# Patient Record
Sex: FEMALE | Race: WHITE | NOT HISPANIC OR LATINO | Employment: PART TIME | URBAN - METROPOLITAN AREA
[De-identification: names, ages, dates, MRNs, and addresses within clinical notes are randomized per-mention and may not be internally consistent; named-entity substitution may affect disease eponyms.]

---

## 2017-01-18 ENCOUNTER — OFFICE VISIT (OUTPATIENT)
Dept: FAMILY MEDICINE | Facility: CLINIC | Age: 42
End: 2017-01-18
Payer: COMMERCIAL

## 2017-01-18 VITALS
WEIGHT: 293 LBS | HEART RATE: 59 BPM | HEIGHT: 68 IN | OXYGEN SATURATION: 97 % | RESPIRATION RATE: 16 BRPM | DIASTOLIC BLOOD PRESSURE: 81 MMHG | SYSTOLIC BLOOD PRESSURE: 131 MMHG | BODY MASS INDEX: 44.41 KG/M2 | TEMPERATURE: 99 F

## 2017-01-18 DIAGNOSIS — R05.8 PRODUCTIVE COUGH: Primary | ICD-10-CM

## 2017-01-18 DIAGNOSIS — J01.00 ACUTE MAXILLARY SINUSITIS, RECURRENCE NOT SPECIFIED: ICD-10-CM

## 2017-01-18 DIAGNOSIS — J06.9 UPPER RESPIRATORY TRACT INFECTION, UNSPECIFIED TYPE: ICD-10-CM

## 2017-01-18 DIAGNOSIS — R06.2 WHEEZING: ICD-10-CM

## 2017-01-18 PROCEDURE — 1159F MED LIST DOCD IN RCRD: CPT | Mod: S$GLB,,, | Performed by: PHYSICIAN ASSISTANT

## 2017-01-18 PROCEDURE — 99999 PR PBB SHADOW E&M-EST. PATIENT-LVL IV: CPT | Mod: PBBFAC,,, | Performed by: PHYSICIAN ASSISTANT

## 2017-01-18 PROCEDURE — 3075F SYST BP GE 130 - 139MM HG: CPT | Mod: S$GLB,,, | Performed by: PHYSICIAN ASSISTANT

## 2017-01-18 PROCEDURE — 3079F DIAST BP 80-89 MM HG: CPT | Mod: S$GLB,,, | Performed by: PHYSICIAN ASSISTANT

## 2017-01-18 PROCEDURE — 99213 OFFICE O/P EST LOW 20 MIN: CPT | Mod: S$GLB,,, | Performed by: PHYSICIAN ASSISTANT

## 2017-01-18 RX ORDER — AMOXICILLIN AND CLAVULANATE POTASSIUM 875; 125 MG/1; MG/1
1 TABLET, FILM COATED ORAL 2 TIMES DAILY
Qty: 20 TABLET | Refills: 0 | Status: SHIPPED | OUTPATIENT
Start: 2017-01-18 | End: 2017-01-28

## 2017-01-18 RX ORDER — BENZONATATE 200 MG/1
200 CAPSULE ORAL 3 TIMES DAILY PRN
Qty: 30 CAPSULE | Refills: 0 | Status: SHIPPED | OUTPATIENT
Start: 2017-01-18 | End: 2017-01-28

## 2017-01-18 RX ORDER — METHYLPREDNISOLONE 4 MG/1
TABLET ORAL
Qty: 1 PACKAGE | Refills: 0 | Status: SHIPPED | OUTPATIENT
Start: 2017-01-18 | End: 2017-03-21 | Stop reason: ALTCHOICE

## 2017-01-18 NOTE — PATIENT INSTRUCTIONS
Take antibiotics with food.  Increase fluid intake.  Call the clinic if symptoms worsen, new symptoms develop or if you are not any better after completion of your antibiotics.      Walking for Fitness  Fitness walking has something for everyone, even people who are already fit. Walking is one of the safest ways to condition your body aerobically. It can boost energy, help you lose weight, and reduce stress.    Physical benefits  · Walking strengthens your heart and lungs, and tones your muscles.  · When walking, your feet land with less impact than in other sports. This reduces chances of muscle, bone, and joint injury.  · Regular walking improves your cholesterol levels and lowers your risk of heart disease. And it helps you control your blood sugar if you have diabetes.  · Walking is a weight-bearing activity, which helps maintain bone density. This can help prevent osteoporosis.  Personal rewards  · Taking walks can help you relax and manage stress. And fitness walking may make you feel better about yourself.  · Walking can help you sleep better at night and make you less likely to be depressed.  · Regular walking may help maintain your memory as you get older.  · Walking is a great way to spend extra time with friends and family members. Be sure to invite your dog along!  Q&A about fitness walking  Q: Will walking keep me fit?  A: Yes. Regular walking at the right pace gives you all the benefits of other aerobic activities, such as jogging and swimming.  Q: Will walking help me lose weight and keep it off?  A: Yes. Per mile, walking can burn as many calories as jogging. Your health care provider can help work walking into your weight-loss plan.  Q: Is walking safe for my health?  A: Yes. Walking is safe if you have high blood pressure, diabetes, heart disease, or other conditions. Talk to your health care provider before you start.  © 4740-3785 [employer]. [address] [place] [address], [place]  [address]. All rights reserved. This information is not intended as a substitute for professional medical care. Always follow your healthcare professional's instructions.          Weight Management: Getting Started  Healthy bodies come in all shapes and sizes. Not all bodies are made to be thin. For some people, a healthy weight is higher than the average weight listed on weight charts. Your healthcare provider can help you decide on a healthy weight for you.    Reasons to lose weight  Losing weight can help with some health problems, such as high blood pressure, heart disease, diabetes, sleep apnea, and arthritis. You may also feel more energy.  Set your long-term goal  Your goal doesn't even have to be a specific weight. You may decide on a fitness goal (such as being able to walk 10 miles a week), or a health goal (such as lowering your blood pressure). Choose a goal that is measurable and reasonable, so you know when you've reached it. A goal of reaching a BMI of less than 25 is not always reasonable (or possible).   Make an action plan  Habits dont change overnight. Setting your goals too high can leave you feeling discouraged if you cant reach them. Be realistic. Choose one or two small changes you can make now. Set an action plan for how you are going to make these changes. When you can stick to this plan, keep making a few more small changes. Taking small steps will help you stay on the path to success.  Track your progress  Write down your goals. Then, keep a daily record of your progress. Write down what you eat and how active you are. This record lets you look back on how much youve done. It may also help when youre feeling frustrated. Reward yourself for success. Even if you dont reach every goal, give yourself credit for what you do get done.  Get support  Encouragement from others can help make losing weight easier. Ask your family members and friends for support. They may even want to join you. Also  look to your healthcare provider, registered dietitian, and  for help. Your local hospital can give you more information about nutrition, exercise, and weight loss.  © 5961-6412 [employer]. [address] [place] [address], [place] [address]. All rights reserved. This information is not intended as a substitute for professional medical care. Always follow your healthcare professional's instructions.

## 2017-01-18 NOTE — PROGRESS NOTES
Subjective:       Patient ID: Lavinia Herrera is a 41 y.o. female.    Chief Complaint: Sore Throat    Sore Throat    This is a new problem. The current episode started in the past 7 days. The problem has been gradually worsening. There has been no fever. The pain is moderate. Associated symptoms include congestion, coughing and a plugged ear sensation. Pertinent negatives include no abdominal pain, drooling, ear discharge, ear pain, headaches, shortness of breath, swollen glands, trouble swallowing or vomiting. She has tried nothing for the symptoms.   Sinusitis   This is a new problem. The current episode started in the past 7 days. The problem has been gradually worsening since onset. There has been no fever. The pain is moderate. Associated symptoms include chills, congestion, coughing, sinus pressure and a sore throat. Pertinent negatives include no ear pain, headaches, shortness of breath, sneezing or swollen glands. Past treatments include nothing.     Review of Systems   Constitutional: Positive for activity change and chills. Negative for appetite change and fever.   HENT: Positive for congestion, postnasal drip, rhinorrhea, sinus pressure and sore throat. Negative for drooling, ear discharge, ear pain, sneezing and trouble swallowing.    Eyes: Negative for visual disturbance.   Respiratory: Positive for cough. Negative for shortness of breath.    Cardiovascular: Negative for chest pain.   Gastrointestinal: Negative for abdominal distention, abdominal pain and vomiting.   Neurological: Negative for headaches.   Hematological: Negative for adenopathy.   Psychiatric/Behavioral: The patient is not nervous/anxious.        Objective:      Physical Exam   Constitutional: She is oriented to person, place, and time.   HENT:   Mouth/Throat: No oropharyngeal exudate.   Posterior oropharynx erythematous  Maxillary sinuses TTP  Left TM:fluid bubbles present behind TM  Right TM: tympanostomy tube present   Eyes:  Conjunctivae are normal. Pupils are equal, round, and reactive to light.   Cardiovascular: Normal rate and regular rhythm.    Pulmonary/Chest: Effort normal and breath sounds normal. She has no wheezes.   Musculoskeletal: She exhibits no edema.   Lymphadenopathy:     She has no cervical adenopathy.   Neurological: She is alert and oriented to person, place, and time.   Skin: No erythema.   Psychiatric: Her behavior is normal.       Assessment:       1. Productive cough    2. Acute maxillary sinusitis, recurrence not specified    3. Wheezing    4. Upper respiratory tract infection, unspecified type        Plan:   Lavinia was seen today for sore throat.    Diagnoses and all orders for this visit:    Productive cough  -     benzonatate (TESSALON) 200 MG capsule; Take 1 capsule (200 mg total) by mouth 3 (three) times daily as needed.    Acute maxillary sinusitis, recurrence not specified  -     amoxicillin-clavulanate 875-125mg (AUGMENTIN) 875-125 mg per tablet; Take 1 tablet by mouth 2 (two) times daily.  -     methylPREDNISolone (MEDROL DOSEPACK) 4 mg tablet; use as directed    Wheezing  -     methylPREDNISolone (MEDROL DOSEPACK) 4 mg tablet; use as directed    Upper respiratory tract infection, unspecified type  -     amoxicillin-clavulanate 875-125mg (AUGMENTIN) 875-125 mg per tablet; Take 1 tablet by mouth 2 (two) times daily.  -     methylPREDNISolone (MEDROL DOSEPACK) 4 mg tablet; use as directed    Take antibiotics with food.  Increase fluid intake.  Call the clinic if symptoms worsen, new symptoms develop or if you are not any better after completion of your antibiotics.

## 2017-01-18 NOTE — MR AVS SNAPSHOT
[facility]  [address]  [place] [address]  Phone: [phone]  Fax: [phone]                  Lavinia Herrera   2017 4:00 PM   Office Visit    Description:  Female : 1975   Provider:  Gloria Arana PA-C   Department:  [facility]           Reason for Visit     Sore Throat           Diagnoses this Visit        Comments    Productive cough    -  Primary     Acute maxillary sinusitis, recurrence not specified         Wheezing         Upper respiratory tract infection, unspecified type                To Do List           Future Appointments        Provider Department Dept Phone    2017 9:40 AM Cara Murguia MD [facility] [phone]      Goals (5 Years of Data)     None       These Medications        Disp Refills Start End    amoxicillin-clavulanate 875-125mg (AUGMENTIN) 875-125 mg per tablet 20 tablet 0 2017    Take 1 tablet by mouth 2 (two) times daily. - Oral    [facility] [address] [place] [address] [address] [facility] Ph #: [phone]       methylPREDNISolone (MEDROL DOSEPACK) 4 mg tablet 1 Package 0 2017     use as directed    [email or web address] [address] [place] [address] [place] AT [facility] Ph #: [phone]       benzonatate (TESSALON) 200 MG capsule 30 capsule 0 2017    Take 1 capsule (200 mg total) by mouth 3 (three) times daily as needed. - Oral    Pharmacy: [facility] [id number]  [address] [facility] Ph #: [phone]         [facility] On Call     [facility] On Call Nurse Care Line -  Assistance  Registered nurses in the [facility] provide clinical advisement, health education, appointment booking, and other advisory services.  Call for this free service at [phone].             Medications           Message  regarding Medications     Verify the changes and/or additions to your medication regime listed below are the same as discussed with your clinician today.  If any of these changes or additions are incorrect, please notify your healthcare provider.        START taking these NEW medications        Refills    amoxicillin-clavulanate 875-125mg (AUGMENTIN) 875-125 mg per tablet 0    Sig: Take 1 tablet by mouth 2 (two) times daily.    Class: Normal    Route: Oral    methylPREDNISolone (MEDROL DOSEPACK) 4 mg tablet 0    Sig: use as directed    Class: Normal    benzonatate (TESSALON) 200 MG capsule 0    Sig: Take 1 capsule (200 mg total) by mouth 3 (three) times daily as needed.    Class: Normal    Route: Oral      STOP taking these medications     neomycin-polymyxin-hydrocortisone (CORTISPORIN) otic solution as needed.            Verify that the below list of medications is an accurate representation of the medications you are currently taking.  If none reported, the list may be blank. If incorrect, please contact your healthcare provider. Carry this list with you in case of emergency.           Current Medications     albuterol 90 mcg/actuation inhaler Inhale 2 puffs into the lungs every 4 (four) hours as needed for Wheezing or Shortness of Breath.    B INFANTIS/B ANI/B MILLICENT/B BIFID (PROBIOTIC 4X ORAL) Take 2 tablets by mouth every evening.     levonorgestrel (MIRENA) 20 mcg/24 hr (5 years) IUD 1 each by Intrauterine route once.    meloxicam (MOBIC) 15 MG tablet Take 1 tablet (15 mg total) by mouth once daily.    metformin (GLUCOPHAGE-XR) 500 MG 24 hr tablet Take 1 tablet (500 mg total) by mouth daily with breakfast.    amoxicillin-clavulanate 875-125mg (AUGMENTIN) 875-125 mg per tablet Take 1 tablet by mouth 2 (two) times daily.    benzonatate (TESSALON) 200 MG capsule Take 1 capsule (200 mg total) by mouth 3 (three) times daily as needed.    methylPREDNISolone (MEDROL DOSEPACK) 4 mg tablet use as directed          "  Clinical Reference Information           Vital Signs - Last Recorded  Most recent update: 1/18/2017  4:09 PM by Shikha Brewer MA    BP Pulse Temp Resp Ht Wt    131/81 (BP Location: Right arm, Patient Position: Sitting, BP Method: Automatic) (!) 59 99 °F (37.2 °C) (Oral) 16 5' 7.5" (1.715 m) (!) 165 kg (363 lb 12.1 oz)    SpO2 BMI             97% 56.13 kg/m2         Blood Pressure          Most Recent Value    BP  131/81      Allergies as of 1/18/2017     No Known Allergies      Immunizations Administered on Date of Encounter - 1/18/2017     None      Instructions    Take antibiotics with food.  Increase fluid intake.  Call the clinic if symptoms worsen, new symptoms develop or if you are not any better after completion of your antibiotics.           "

## 2017-03-20 ENCOUNTER — DOCUMENTATION ONLY (OUTPATIENT)
Dept: FAMILY MEDICINE | Facility: CLINIC | Age: 42
End: 2017-03-20

## 2017-03-20 NOTE — PROGRESS NOTES
Pre-Visit Chart Review  For Appointment Scheduled on 3/24/2017    There are no preventive care reminders to display for this patient.

## 2017-03-21 ENCOUNTER — OFFICE VISIT (OUTPATIENT)
Dept: FAMILY MEDICINE | Facility: CLINIC | Age: 42
End: 2017-03-21
Payer: COMMERCIAL

## 2017-03-21 VITALS
SYSTOLIC BLOOD PRESSURE: 137 MMHG | HEIGHT: 68 IN | WEIGHT: 293 LBS | OXYGEN SATURATION: 96 % | BODY MASS INDEX: 44.41 KG/M2 | TEMPERATURE: 99 F | HEART RATE: 58 BPM | DIASTOLIC BLOOD PRESSURE: 86 MMHG

## 2017-03-21 DIAGNOSIS — J06.9 UPPER RESPIRATORY TRACT INFECTION, UNSPECIFIED TYPE: Primary | ICD-10-CM

## 2017-03-21 DIAGNOSIS — J30.9 ALLERGIC RHINITIS, UNSPECIFIED ALLERGIC RHINITIS TRIGGER, UNSPECIFIED RHINITIS SEASONALITY: ICD-10-CM

## 2017-03-21 DIAGNOSIS — R05.8 DRY COUGH: ICD-10-CM

## 2017-03-21 DIAGNOSIS — E66.01 MORBID OBESITY WITH BMI OF 50.0-59.9, ADULT: ICD-10-CM

## 2017-03-21 DIAGNOSIS — F41.9 ANXIETY AND DEPRESSION: ICD-10-CM

## 2017-03-21 DIAGNOSIS — F32.A ANXIETY AND DEPRESSION: ICD-10-CM

## 2017-03-21 PROCEDURE — 99999 PR PBB SHADOW E&M-EST. PATIENT-LVL IV: CPT | Mod: PBBFAC,,, | Performed by: PHYSICIAN ASSISTANT

## 2017-03-21 PROCEDURE — 3075F SYST BP GE 130 - 139MM HG: CPT | Mod: S$GLB,,, | Performed by: PHYSICIAN ASSISTANT

## 2017-03-21 PROCEDURE — 99214 OFFICE O/P EST MOD 30 MIN: CPT | Mod: S$GLB,,, | Performed by: PHYSICIAN ASSISTANT

## 2017-03-21 PROCEDURE — 3079F DIAST BP 80-89 MM HG: CPT | Mod: S$GLB,,, | Performed by: PHYSICIAN ASSISTANT

## 2017-03-21 PROCEDURE — 1160F RVW MEDS BY RX/DR IN RCRD: CPT | Mod: S$GLB,,, | Performed by: PHYSICIAN ASSISTANT

## 2017-03-21 RX ORDER — PANTOPRAZOLE SODIUM 40 MG/1
40 TABLET, DELAYED RELEASE ORAL DAILY
Qty: 30 TABLET | Refills: 3 | Status: SHIPPED | OUTPATIENT
Start: 2017-03-21 | End: 2017-06-13 | Stop reason: SDUPTHER

## 2017-03-21 RX ORDER — ESCITALOPRAM OXALATE 10 MG/1
10 TABLET ORAL DAILY
Qty: 30 TABLET | Refills: 3 | Status: SHIPPED | OUTPATIENT
Start: 2017-03-21 | End: 2017-06-13 | Stop reason: SDUPTHER

## 2017-03-21 NOTE — PATIENT INSTRUCTIONS
Weight Management: Getting Started  Healthy bodies come in all shapes and sizes. Not all bodies are made to be thin. For some people, a healthy weight is higher than the average weight listed on weight charts. Your healthcare provider can help you decide on a healthy weight for you.    Reasons to lose weight  Losing weight can help with some health problems, such as high blood pressure, heart disease, diabetes, sleep apnea, and arthritis. You may also feel more energy.  Set your long-term goal  Your goal doesn't even have to be a specific weight. You may decide on a fitness goal (such as being able to walk 10 miles a week), or a health goal (such as lowering your blood pressure). Choose a goal that is measurable and reasonable, so you know when you've reached it. A goal of reaching a BMI of less than 25 is not always reasonable (or possible).   Make an action plan  Habits dont change overnight. Setting your goals too high can leave you feeling discouraged if you cant reach them. Be realistic. Choose one or two small changes you can make now. Set an action plan for how you are going to make these changes. When you can stick to this plan, keep making a few more small changes. Taking small steps will help you stay on the path to success.  Track your progress  Write down your goals. Then, keep a daily record of your progress. Write down what you eat and how active you are. This record lets you look back on how much youve done. It may also help when youre feeling frustrated. Reward yourself for success. Even if you dont reach every goal, give yourself credit for what you do get done.  Get support  Encouragement from others can help make losing weight easier. Ask your family members and friends for support. They may even want to join you. Also look to your healthcare provider, registered dietitian, and  for help. Your local hospital can give you more information about nutrition, exercise, and  weight loss.  Date Last Reviewed: 1/31/2016 © 2000-2016 [employer]. [address] [place] [address], [place] [address]. All rights reserved. This information is not intended as a substitute for professional medical care. Always follow your healthcare professional's instructions.        Walking for Fitness  Fitness walking has something for everyone, even people who are already fit. Walking is one of the safest ways to condition your body aerobically. It can boost energy, help you lose weight, and reduce stress.    Physical benefits  · Walking strengthens your heart and lungs, and tones your muscles.  · When walking, your feet land with less impact than in other sports. This reduces chances of muscle, bone, and joint injury.  · Regular walking improves your cholesterol levels and lowers your risk of heart disease. And it helps you control your blood sugar if you have diabetes.  · Walking is a weight-bearing activity, which helps maintain bone density. This can help prevent osteoporosis.  Personal rewards  · Taking walks can help you relax and manage stress. And fitness walking may make you feel better about yourself.  · Walking can help you sleep better at night and make you less likely to be depressed.  · Regular walking may help maintain your memory as you get older.  · Walking is a great way to spend extra time with friends and family members. Be sure to invite your dog along!  Q&A about fitness walking  Q: Will walking keep me fit?  A: Yes. Regular walking at the right pace gives you all the benefits of other aerobic activities, such as jogging and swimming.  Q: Will walking help me lose weight and keep it off?  A: Yes. Per mile, walking can burn as many calories as jogging. Your health care provider can help work walking into your weight-loss plan.  Q: Is walking safe for my health?  A: Yes. Walking is safe if you have high blood pressure, diabetes, heart disease, or other conditions. Talk to your health  care provider before you start.  Date Last Reviewed: 5/9/2015  © 8606-5657 [employer]. [address] [place] [address], [place] [address]. All rights reserved. This information is not intended as a substitute for professional medical care. Always follow your healthcare professional's instructions.

## 2017-03-21 NOTE — MR AVS SNAPSHOT
[facility]  [address]  [place] [address]  Phone: [phone]  Fax: [phone]                  Lavinia Herrera   3/21/2017 11:20 AM   Office Visit    Description:  Female : 1975   Provider:  Val Mayo PA-C   Department:  [place] [facility]           Reason for Visit     Chest Congestion     Cough     Anxiety           Diagnoses this Visit        Comments    Anxiety and depression    -  Primary     Allergic rhinitis, unspecified allergic rhinitis trigger, unspecified rhinitis seasonality         Dry cough                To Do List           Future Appointments        Provider Department Dept Phone    3/24/2017 7:40 AM Val Mayo PA-C [facility] [phone]    2017 10:00 AM Torrie uRggiero MD [place] [facility] [phone]    2017 9:00 AM Val Mayo PA-C [facility] [phone]    2017 9:40 AM Cara Murguia MD [facility] [phone]      Goals (5 Years of Data)     None      Follow-Up and Disposition     Return in about 4 weeks (around 2017).       These Medications        Disp Refills Start End    escitalopram oxalate (LEXAPRO) 10 MG tablet 30 tablet 3 3/21/2017 3/21/2018    Take 1 tablet (10 mg total) by mouth once daily. - Oral    Pharmacy: [id number]  [place] - [address] [facility] Ph #: [phone]       pantoprazole (PROTONIX) 40 MG tablet 30 tablet 3 3/21/2017 3/21/2018    Take 1 tablet (40 mg total) by mouth once daily. - Oral    [id number] [place] - [address] [facility] Ph #: [phone]         [place] On Call     [facility] On Call Nurse Care Line -  Assistance  Registered nurses in the [facility] On Call Center provide clinical advisement, health education, appointment booking, and other advisory services.  Call for this free service at  [phone].             Medications           Message regarding Medications     Verify the changes and/or additions to your medication regime listed below are the same as discussed with your clinician today.  If any of these changes or additions are incorrect, please notify your healthcare provider.        START taking these NEW medications        Refills    escitalopram oxalate (LEXAPRO) 10 MG tablet 3    Sig: Take 1 tablet (10 mg total) by mouth once daily.    Class: Normal    Route: Oral    pantoprazole (PROTONIX) 40 MG tablet 3    Sig: Take 1 tablet (40 mg total) by mouth once daily.    Class: Normal    Route: Oral      STOP taking these medications     methylPREDNISolone (MEDROL DOSEPACK) 4 mg tablet use as directed           Verify that the below list of medications is an accurate representation of the medications you are currently taking.  If none reported, the list may be blank. If incorrect, please contact your healthcare provider. Carry this list with you in case of emergency.           Current Medications     B INFANTIS/B ANI/B MILLICENT/B BIFID (PROBIOTIC 4X ORAL) Take 2 tablets by mouth every evening.     levonorgestrel (MIRENA) 20 mcg/24 hr (5 years) IUD 1 each by Intrauterine route once.    meloxicam (MOBIC) 15 MG tablet Take 1 tablet (15 mg total) by mouth once daily.    metformin (GLUCOPHAGE-XR) 500 MG 24 hr tablet Take 1 tablet (500 mg total) by mouth daily with breakfast.    albuterol 90 mcg/actuation inhaler Inhale 2 puffs into the lungs every 4 (four) hours as needed for Wheezing or Shortness of Breath.    escitalopram oxalate (LEXAPRO) 10 MG tablet Take 1 tablet (10 mg total) by mouth once daily.    pantoprazole (PROTONIX) 40 MG tablet Take 1 tablet (40 mg total) by mouth once daily.           Clinical Reference Information           Your Vitals Were     BP Pulse Temp Height Weight SpO2    137/86 (BP Location: Right arm, Patient Position: Sitting, BP Method: Automatic) 58 99 °F (37.2 °C) (Oral)  "5' 7.5" (1.715 m) 168.6 kg (371 lb 11.1 oz) 96%    BMI                57.36 kg/m2          Blood Pressure          Most Recent Value    BP  137/86      Allergies as of 3/21/2017     No Known Allergies      Immunizations Administered on Date of Encounter - 3/21/2017     None      Orders Placed During Today's Visit      Normal Orders This Visit    Ambulatory referral to Allergy       Instructions      Weight Management: Getting Started  Healthy bodies come in all shapes and sizes. Not all bodies are made to be thin. For some people, a healthy weight is higher than the average weight listed on weight charts. Your healthcare provider can help you decide on a healthy weight for you.    Reasons to lose weight  Losing weight can help with some health problems, such as high blood pressure, heart disease, diabetes, sleep apnea, and arthritis. You may also feel more energy.  Set your long-term goal  Your goal doesn't even have to be a specific weight. You may decide on a fitness goal (such as being able to walk 10 miles a week), or a health goal (such as lowering your blood pressure). Choose a goal that is measurable and reasonable, so you know when you've reached it. A goal of reaching a BMI of less than 25 is not always reasonable (or possible).   Make an action plan  Habits dont change overnight. Setting your goals too high can leave you feeling discouraged if you cant reach them. Be realistic. Choose one or two small changes you can make now. Set an action plan for how you are going to make these changes. When you can stick to this plan, keep making a few more small changes. Taking small steps will help you stay on the path to success.  Track your progress  Write down your goals. Then, keep a daily record of your progress. Write down what you eat and how active you are. This record lets you look back on how much youve done. It may also help when youre feeling frustrated. Reward yourself for success. Even if you dont " reach every goal, give yourself credit for what you do get done.  Get support  Encouragement from others can help make losing weight easier. Ask your family members and friends for support. They may even want to join you. Also look to your healthcare provider, registered dietitian, and  for help. Your local hospital can give you more information about nutrition, exercise, and weight loss.  Date Last Reviewed: 1/31/2016  © 1085-9635 [employer]. [address] [place] [address], [place] [address]. All rights reserved. This information is not intended as a substitute for professional medical care. Always follow your healthcare professional's instructions.        Walking for Fitness  Fitness walking has something for everyone, even people who are already fit. Walking is one of the safest ways to condition your body aerobically. It can boost energy, help you lose weight, and reduce stress.    Physical benefits  · Walking strengthens your heart and lungs, and tones your muscles.  · When walking, your feet land with less impact than in other sports. This reduces chances of muscle, bone, and joint injury.  · Regular walking improves your cholesterol levels and lowers your risk of heart disease. And it helps you control your blood sugar if you have diabetes.  · Walking is a weight-bearing activity, which helps maintain bone density. This can help prevent osteoporosis.  Personal rewards  · Taking walks can help you relax and manage stress. And fitness walking may make you feel better about yourself.  · Walking can help you sleep better at night and make you less likely to be depressed.  · Regular walking may help maintain your memory as you get older.  · Walking is a great way to spend extra time with friends and family members. Be sure to invite your dog along!  Q&A about fitness walking  Q: Will walking keep me fit?  A: Yes. Regular walking at the right pace gives you all the benefits of other  aerobic activities, such as jogging and swimming.  Q: Will walking help me lose weight and keep it off?  A: Yes. Per mile, walking can burn as many calories as jogging. Your health care provider can help work walking into your weight-loss plan.  Q: Is walking safe for my health?  A: Yes. Walking is safe if you have high blood pressure, diabetes, heart disease, or other conditions. Talk to your health care provider before you start.  Date Last Reviewed: 5/9/2015 © 2000-2016 [employer]. [address] [place] [address]. All rights reserved. This information is not intended as a substitute for professional medical care. Always follow your healthcare professional's instructions.             Language Assistance Services     ATTENTION: Language assistance services are available, free of charge. Please call [phone].      ATENCIÓN: Si habla vee, tiene a salgado disposición servicios gratuitos de asistencia lingüística. Llame al [phone].     CHÚ Ý: N?u b?n nói Ti?ng Vi?t, có các d?ch v? h? tr? ngôn ng? mi?n phí dành cho b?n. G?i s? [phone].         [facility] complies with applicable Federal civil rights laws and does not discriminate on the basis of race, color, national origin, age, disability, or sex.

## 2017-03-21 NOTE — PROGRESS NOTES
Subjective:       Patient ID: Lavinia Herrera is a 42 y.o. female.    Chief Complaint: Chest Congestion; Cough (coughing up yellow thick phlegm); and Anxiety    HPI Comments: Mrs. Herrera is a 42 year old female who presents to clinic for urgent care evaluation of cough. She was seen in clinic in January and was diagnosed with cough/acute sinusitis and treated with augmentin/medrol/albuterol inhaler. She states symptoms resolved. She states about 2 weeks ago she noticed recurrence of productive cough, slight rhinorrhea, and occasional wheezing. She denies sore throat, otalgia, or sinus pressure. She denies fever/chills. She states her cough has actually improved over the last 24-48 hours. She has been taking OTC mucinex and using flonase daily for her symptoms. She also complains of chronic daily cough, which she has been suffering with for months. She also complains of intermittent wheezing after eating or when lying down at night. PFT's  completed in July 2016 were normal. She has no hx of asthma. She complains of persistent allergy symptoms of rhinorrhea, sneezing, and watery/dry eyes. She is unable to tolerate oral anti-histamines due to drowsiness. She also complains of increasing anxiety over the last 3-6 months. She states her son is graduating from [employer] and will be moving to [place] to attend [employer]. She states she has been tearful at times and other times snapping her children and . She also reports decreased interest in her normal activities, trouble concentrating, and constant worry about too many different things. She denies SI or HI.     Review of Systems   Constitutional: Negative for chills, fatigue and fever.   HENT: Positive for postnasal drip, rhinorrhea and sneezing. Negative for congestion, ear pain, nosebleeds, sinus pressure, tinnitus and voice change.    Eyes: Negative for visual disturbance.   Respiratory: Positive for cough and wheezing. Negative for shortness of breath.     Cardiovascular: Negative for chest pain, palpitations and leg swelling.   Gastrointestinal: Negative for abdominal pain, constipation, diarrhea, nausea and vomiting.   Musculoskeletal: Negative for arthralgias.   Neurological: Negative for dizziness, weakness, light-headedness and headaches.   Psychiatric/Behavioral: Positive for decreased concentration and dysphoric mood. Negative for agitation, confusion, hallucinations, self-injury, sleep disturbance and suicidal ideas. The patient is nervous/anxious. The patient is not hyperactive.        Objective:      Vitals:    03/21/17 1139   BP: 137/86   Pulse: (!) 58   Temp: 99 °F (37.2 °C)     Physical Exam   Constitutional: She is oriented to person, place, and time. She appears well-developed.   Obese body habitus.   HENT:   Head: Normocephalic and atraumatic.   Right Ear: Hearing, tympanic membrane, external ear and ear canal normal.   Left Ear: Hearing, tympanic membrane, external ear and ear canal normal.   Nose: Right sinus exhibits no maxillary sinus tenderness and no frontal sinus tenderness. Left sinus exhibits no maxillary sinus tenderness and no frontal sinus tenderness.   Mouth/Throat: Oropharynx is clear and moist and mucous membranes are normal. No posterior oropharyngeal edema or posterior oropharyngeal erythema.   Eyes: Conjunctivae, EOM and lids are normal. Pupils are equal, round, and reactive to light.   Cardiovascular: Regular rhythm, S1 normal and S2 normal.    Pulmonary/Chest: Effort normal and breath sounds normal. She has no wheezes. She has no rhonchi. She has no rales.   Neurological: She is alert and oriented to person, place, and time.   Skin: Skin is warm and dry.   Psychiatric: Her speech is normal and behavior is normal. Thought content normal. Her mood appears anxious. She exhibits a depressed mood.   Vitals reviewed.      Assessment:       1. Upper respiratory tract infection, unspecified type    2. Anxiety and depression    3. Allergic  rhinitis, unspecified allergic rhinitis trigger, unspecified rhinitis seasonality    4. Dry cough    5. Morbid obesity with BMI of 50.0-59.9, adult        Plan:       Upper respiratory tract infection, unspecified type        - I counseled the patient on general home care guidelines for cough and congestion including increasing fluid intake, getting plenty of rest and use of OTC cough and cold medications.  I recommended guafenesin for congestion and dextromethorphan as directed for cough.  A brand like Mucinex DM is recommended.  Avoidance of decongestants is recommended for patients with heart problems and hypertension.  Extra vitamin C may also benefit.  Return to clinic if symptoms last longer than 10 days or sooner if worsen with symptoms like fever > 100.4, severe sinus pain or headache, thick yellow nasal discharge or sputum, dehydration or lethargy.      Anxiety and depression  -     escitalopram oxalate (LEXAPRO) 10 MG tablet; Take 1 tablet (10 mg total) by mouth once daily.  Dispense: 30 tablet; Refill: 3  - I discussed with the patient the risks, side effects and the benefits of the medication including the black box warning regarding suicidal ideation/risk if applicable.  I counseled the patient on medication titration, length of time before maximum benefits are reached, and duration of treatment expected.  I advised the patient to return to clinic or go to the emergency department if suicidal thoughts occur, thought of hurting others, hallucinations, or other serious symptoms.  Patient voiced no intention of self-harm.  The patient expressed verbal understanding and elected to proceed with treatment.  All questions were answered.      Allergic rhinitis, unspecified allergic rhinitis trigger, unspecified rhinitis seasonality  -     Ambulatory referral to Allergy    Dry cough        -     Ambulatory referral to Allergy  -     pantoprazole (PROTONIX) 40 MG tablet; Take 1 tablet (40 mg total) by mouth once  daily.  Dispense: 30 tablet; Refill: 3    Morbid obesity with BMI of 50.0-59.9, adult        - See below    Patient readiness: acceptance and barriers:none    During the course of the visit the patient was educated and counseled about the following:     Obesity:   Not addressed at today's urgent care visit.     Goals: Obesity: Reduce calorie intake and BMI    Did patient meet goals/outcomes: No    The following self management tools provided: declined    Patient Instructions (the written plan) was given to the patient/family.     Time spent with patient: 30 minutes    Follow up in 1 month

## 2017-04-04 ENCOUNTER — OFFICE VISIT (OUTPATIENT)
Dept: ALLERGY | Facility: CLINIC | Age: 42
End: 2017-04-04
Payer: COMMERCIAL

## 2017-04-04 ENCOUNTER — LAB VISIT (OUTPATIENT)
Dept: LAB | Facility: HOSPITAL | Age: 42
End: 2017-04-04
Attending: ALLERGY & IMMUNOLOGY
Payer: COMMERCIAL

## 2017-04-04 VITALS
BODY MASS INDEX: 45.99 KG/M2 | HEART RATE: 56 BPM | DIASTOLIC BLOOD PRESSURE: 80 MMHG | SYSTOLIC BLOOD PRESSURE: 126 MMHG | OXYGEN SATURATION: 98 % | HEIGHT: 67 IN | WEIGHT: 293 LBS

## 2017-04-04 DIAGNOSIS — J31.0 CHRONIC RHINITIS: Primary | ICD-10-CM

## 2017-04-04 DIAGNOSIS — R05.3 CHRONIC COUGH: ICD-10-CM

## 2017-04-04 DIAGNOSIS — J31.0 CHRONIC RHINITIS: ICD-10-CM

## 2017-04-04 DIAGNOSIS — H10.423 SIMPLE CHRONIC CONJUNCTIVITIS OF BOTH EYES: ICD-10-CM

## 2017-04-04 LAB
IGA SERPL-MCNC: 193 MG/DL
IGE SERPL-ACNC: <35 IU/ML
IGG SERPL-MCNC: 1067 MG/DL
IGM SERPL-MCNC: 103 MG/DL

## 2017-04-04 PROCEDURE — 82784 ASSAY IGA/IGD/IGG/IGM EACH: CPT | Mod: 59

## 2017-04-04 PROCEDURE — 99999 PR PBB SHADOW E&M-EST. PATIENT-LVL III: CPT | Mod: PBBFAC,,, | Performed by: ALLERGY & IMMUNOLOGY

## 2017-04-04 PROCEDURE — 86317 IMMUNOASSAY INFECTIOUS AGENT: CPT

## 2017-04-04 PROCEDURE — 82787 IGG 1 2 3 OR 4 EACH: CPT | Mod: 59

## 2017-04-04 PROCEDURE — 99244 OFF/OP CNSLTJ NEW/EST MOD 40: CPT | Mod: S$GLB,,, | Performed by: ALLERGY & IMMUNOLOGY

## 2017-04-04 PROCEDURE — 82784 ASSAY IGA/IGD/IGG/IGM EACH: CPT

## 2017-04-04 PROCEDURE — 86003 ALLG SPEC IGE CRUDE XTRC EA: CPT

## 2017-04-04 PROCEDURE — 82785 ASSAY OF IGE: CPT

## 2017-04-04 PROCEDURE — 86003 ALLG SPEC IGE CRUDE XTRC EA: CPT | Mod: 59

## 2017-04-04 NOTE — LETTER
April 4, 2017      Negin Chaudhry MD  [address]  [place] [address]           [place] [facility]  [address]  [place] [address]  Phone: [phone]          Patient: Lavinia Herrera   MR Number: [id number]   YOB: 1975   Date of Visit: 4/4/2017       Dear Dr. Negin Chaudhry:    Thank you for referring Lavinia Herrera to me for evaluation. Attached you will find relevant portions of my assessment and plan of care.    If you have questions, please do not hesitate to call me. I look forward to following Lavinia Herrera along with you.    Sincerely,    Torrie Ruggiero MD    Enclosure  CC:  No Recipients    If you would like to receive this communication electronically, please contact [email or web address] or [phone] to request more information on [employer] Link access.    For providers and/or their staff who would like to refer a patient to [facility], please contact us through our one-stop-shop provider referral line, [facility], at [phone].    If you feel you have received this communication in error or would no longer like to receive these types of communications, please e-mail [email or web address]

## 2017-04-04 NOTE — PROGRESS NOTES
Subjective:       Patient ID: Lavinia Herrera is a 42 y.o. female.    Chief Complaint:  Cough (cough, pnd, constant, intense. Dry itching eyes.)      HPI Comments: 41 yo woman presents for consult from Dr Negin Chaudhry for possible allergies. She states she always has PND and cough. At times wheeze. She will cough to pint is dizzy and head hurts and occ vomits mucus. In January had bad chest infection, treated with antibiotic and prednisone. Got better but a week later came back. This is normal for her, once gets sick keeps for months. She feels fluid in her ears at times, stuffy nose especially AM and then uses Nasonex and helps,. Some runny nose but more PND, some sneeze fits. No SOB. Has all year. No difference inside or out. No triggers. No time of day worse. zyrtec in past made too drowsy. Claritin no help. She takes Mucinex and Nasonex which help some. In March she started PPI which has helped but still with PND and cough. She has ear tubes but never sinus surgery. no asthma or eczema. No known food, insect or latex allergy.     Cough   Associated symptoms include headaches, postnasal drip, rhinorrhea and wheezing. Pertinent negatives include no chest pain, chills, ear pain, eye redness, fever, myalgias, rash, sore throat or shortness of breath.       Environmental History: see history section for home environment  Review of Systems   Constitutional: Negative for appetite change, chills, fatigue and fever.   HENT: Positive for congestion, postnasal drip, rhinorrhea, sinus pressure and sneezing. Negative for ear discharge, ear pain, facial swelling, nosebleeds, sore throat, trouble swallowing and voice change.    Eyes: Negative for discharge, redness, itching and visual disturbance.   Respiratory: Positive for cough and wheezing. Negative for choking, chest tightness and shortness of breath.    Cardiovascular: Negative for chest pain, palpitations and leg swelling.   Gastrointestinal: Positive for nausea and  vomiting. Negative for abdominal distention, abdominal pain, constipation and diarrhea.   Genitourinary: Negative for difficulty urinating.   Musculoskeletal: Negative for arthralgias, gait problem, joint swelling and myalgias.   Skin: Negative for color change and rash.   Neurological: Positive for light-headedness and headaches. Negative for dizziness, syncope and weakness.   Hematological: Negative for adenopathy. Does not bruise/bleed easily.   Psychiatric/Behavioral: Negative for agitation, behavioral problems, confusion and sleep disturbance. The patient is not nervous/anxious.         Objective:    Physical Exam   Constitutional: She is oriented to person, place, and time. She appears well-developed and well-nourished. No distress.   HENT:   Head: Normocephalic and atraumatic.   Right Ear: Hearing, tympanic membrane, external ear and ear canal normal.   Left Ear: Hearing, tympanic membrane, external ear and ear canal normal.   Nose: No mucosal edema, rhinorrhea, sinus tenderness or septal deviation. No epistaxis. Right sinus exhibits no maxillary sinus tenderness and no frontal sinus tenderness. Left sinus exhibits no maxillary sinus tenderness and no frontal sinus tenderness.   Mouth/Throat: Uvula is midline, oropharynx is clear and moist and mucous membranes are normal. No uvula swelling.   Eyes: Conjunctivae are normal. Right eye exhibits no discharge. Left eye exhibits no discharge.   Neck: Normal range of motion. No thyromegaly present.   Cardiovascular: Normal rate, regular rhythm and normal heart sounds.    No murmur heard.  Pulmonary/Chest: Effort normal and breath sounds normal. No respiratory distress. She has no wheezes.   Abdominal: Soft. She exhibits no distension. There is no tenderness.   Musculoskeletal: Normal range of motion. She exhibits no edema or tenderness.   Lymphadenopathy:     She has no cervical adenopathy.   Neurological: She is alert and oriented to person, place, and time.    Skin: Skin is warm and dry. No rash noted. No erythema.   Psychiatric: She has a normal mood and affect. Her behavior is normal. Judgment and thought content normal.   Nursing note and vitals reviewed.      Laboratory:   none performed   Assessment:       1. Chronic rhinitis    2. Chronic cough         Plan:       1. Advised that symptoms could be allergy vs reflux vs irritant rhinitis. Will send immunocaps. Continue Nasonex 2 SEN daily and Protonix daily  2. Phone review  3. Dr Chaudhry notified of completed consult via "[employer]

## 2017-04-07 ENCOUNTER — DOCUMENTATION ONLY (OUTPATIENT)
Dept: FAMILY MEDICINE | Facility: CLINIC | Age: 42
End: 2017-04-07

## 2017-04-07 LAB
IGG1 SER-MCNC: 726 MG/DL
IGG2 SER-MCNC: 196 MG/DL
IGG3 SER-MCNC: 35 MG/DL
IGG4 SER-MCNC: 25 MG/DL

## 2017-04-07 NOTE — PATIENT INSTRUCTIONS
Controlling High Blood Pressure  High blood pressure (hypertension) is often called the silent killer. This is because many people who have it dont know it. High blood pressure is defined as 140/90 mm Hg or higher. Know your blood pressure and remember to check it regularly. Doing so can save your life. Here are some things you can do to help control your blood pressure.    Choose heart-healthy foods  · Select low-salt, low-fat foods. Limit sodium intake to 2,400 mg per day or the amount suggested by your healthcare provider.  · Limit canned, dried, cured, packaged, and fast foods. These can contain a lot of salt.  · Eat 8 to 10 servings of fruits and vegetables every day.  · Choose lean meats, fish, or chicken.  · Eat whole-grain pasta, brown rice, and beans.  · Eat 2 to 3 servings of low-fat or fat-free dairy products.  · Ask your doctor about the DASH eating plan. This plan helps reduce blood pressure.  · When you go to a restaurant, ask that your meal be prepared with no added salt.  Maintain a healthy weight  · Ask your healthcare provider how many calories to eat a day. Then stick to that number.  · Ask your healthcare provider what weight range is healthiest for you. If you are overweight, a weight loss of only 3% to 5% of your body weight can help lower blood pressure. Generally, a good weight loss goal is to lose 10% of your body weight in a year.  · Limit snacks and sweets.  · Get regular exercise.  Get up and get active  · Choose activities you enjoy. Find ones you can do with friends or family. This includes bicycling, dancing, walking, and jogging.  · Park farther away from building entrances.  · Use stairs instead of the elevator.  · When you can, walk or bike instead of driving.  · [place] leaves, garden, or do household repairs.  · Be active at a moderate to vigorous level of physical activity for at least 40 minutes for a minimum of 3 to 4 days a week.   Manage stress  · Make time to relax and enjoy  life. Find time to laugh.  · Communicate your concerns with your loved ones and your healthcare provider.  · Visit with family and friends, and keep up with hobbies.  Limit alcohol and quit smoking  · Men should have no more than 2 drinks per day.  · Women should have no more than 1 drink per day.  · Talk with your healthcare provider about quitting smoking. Smoking significantly increases your risk for heart disease and stroke. Ask your healthcare provider about community smoking cessation programs and other options.  Medicines  If lifestyle changes arent enough, your healthcare provider may prescribe high blood pressure medicine. Take all medicines as prescribed. If you have any questions about your medicines, ask your healthcare provider before stopping or changing them.   Date Last Reviewed: 4/27/2016 © 2000-2016 [employer]. [address] [place] [address], [place] [address]. All rights reserved. This information is not intended as a substitute for professional medical care. Always follow your healthcare professional's instructions.        Weight Management: Getting Started  Healthy bodies come in all shapes and sizes. Not all bodies are made to be thin. For some people, a healthy weight is higher than the average weight listed on weight charts. Your healthcare provider can help you decide on a healthy weight for you.    Reasons to lose weight  Losing weight can help with some health problems, such as high blood pressure, heart disease, diabetes, sleep apnea, and arthritis. You may also feel more energy.  Set your long-term goal  Your goal doesn't even have to be a specific weight. You may decide on a fitness goal (such as being able to walk 10 miles a week), or a health goal (such as lowering your blood pressure). Choose a goal that is measurable and reasonable, so you know when you've reached it. A goal of reaching a BMI of less than 25 is not always reasonable (or possible).   Make an action  plan  Habits dont change overnight. Setting your goals too high can leave you feeling discouraged if you cant reach them. Be realistic. Choose one or two small changes you can make now. Set an action plan for how you are going to make these changes. When you can stick to this plan, keep making a few more small changes. Taking small steps will help you stay on the path to success.  Track your progress  Write down your goals. Then, keep a daily record of your progress. Write down what you eat and how active you are. This record lets you look back on how much youve done. It may also help when youre feeling frustrated. Reward yourself for success. Even if you dont reach every goal, give yourself credit for what you do get done.  Get support  Encouragement from others can help make losing weight easier. Ask your family members and friends for support. They may even want to join you. Also look to your healthcare provider, registered dietitian, and  for help. Your local hospital can give you more information about nutrition, exercise, and weight loss.  Date Last Reviewed: 1/31/2016 © 2000-2016 [employer]. [address] [place] [address]. All rights reserved. This information is not intended as a substitute for professional medical care. Always follow your healthcare professional's instructions.        Walking for Fitness  Fitness walking has something for everyone, even people who are already fit. Walking is one of the safest ways to condition your body aerobically. It can boost energy, help you lose weight, and reduce stress.    Physical benefits  · Walking strengthens your heart and lungs, and tones your muscles.  · When walking, your feet land with less impact than in other sports. This reduces chances of muscle, bone, and joint injury.  · Regular walking improves your cholesterol levels and lowers your risk of heart disease. And it helps you control your blood sugar if  you have diabetes.  · Walking is a weight-bearing activity, which helps maintain bone density. This can help prevent osteoporosis.  Personal rewards  · Taking walks can help you relax and manage stress. And fitness walking may make you feel better about yourself.  · Walking can help you sleep better at night and make you less likely to be depressed.  · Regular walking may help maintain your memory as you get older.  · Walking is a great way to spend extra time with friends and family members. Be sure to invite your dog along!  Q&A about fitness walking  Q: Will walking keep me fit?  A: Yes. Regular walking at the right pace gives you all the benefits of other aerobic activities, such as jogging and swimming.  Q: Will walking help me lose weight and keep it off?  A: Yes. Per mile, walking can burn as many calories as jogging. Your health care provider can help work walking into your weight-loss plan.  Q: Is walking safe for my health?  A: Yes. Walking is safe if you have high blood pressure, diabetes, heart disease, or other conditions. Talk to your health care provider before you start.  Date Last Reviewed: 5/9/2015  © 4143-2700 [employer]. [address] [place] [address], [place] [address]. All rights reserved. This information is not intended as a substitute for professional medical care. Always follow your healthcare professional's instructions.

## 2017-04-07 NOTE — PROGRESS NOTES
Pre-Visit Chart Review  For Appointment Scheduled on 04/18/2017    There are no preventive care reminders to display for this patient.

## 2017-04-08 LAB
A ALTERNATA IGE QN: <0.35 KU/L
A FUMIGATUS IGE QN: <0.35 KU/L
ALLERGEN MAPLE/SYCAMORE IGE: <0.35 KU/L
ALLERGEN PENICILLIUM IGE: <0.35 KU/L
ALLERGEN WALNUT TREE IGE: <0.35 KU/L
ALLERGEN WHEAT IGE: <0.35 KU/L
ALLERGEN WHITE PINE TREE IGE: <0.35 KU/L
ALLERGEN WILLOW IGE: <0.35 KU/L
ALMOND IGE QN: <0.35 KU/L
BAHIA GRASS IGE QN: <0.35 KU/L
BALD CYPRESS IGE QN: <0.35 KU/L
BERMUDA GRASS IGE QN: <0.35 KU/L
C GLOBOSUM IGE QN: <0.35 KU/L
C HERBARUM IGE QN: <0.35 KU/L
CAT DANDER IGE QN: <0.35 KU/L
COMMON RAGWEED IGE QN: <0.35 KU/L
COTTONWOOD IGE QN: <0.35 KU/L
COW MILK IGE QN: <0.35 KU/L
D FARINAE IGE QN: <0.35 KU/L
D PTERONYSS IGE QN: <0.35 KU/L
DEPRECATED A ALTERNATA IGE RAST QL: NORMAL
DEPRECATED A FUMIGATUS IGE RAST QL: NORMAL
DEPRECATED ALMOND IGE RAST QL: NORMAL
DEPRECATED BAHIA GRASS IGE RAST QL: NORMAL
DEPRECATED BALD CYPRESS IGE RAST QL: NORMAL
DEPRECATED BERMUDA GRASS IGE RAST QL: NORMAL
DEPRECATED C GLOBOSUM IGE RAST QL: NORMAL
DEPRECATED C HERBARUM IGE RAST QL: NORMAL
DEPRECATED CAT DANDER IGE RAST QL: NORMAL
DEPRECATED COMMON RAGWEED IGE RAST QL: NORMAL
DEPRECATED COTTONWOOD IGE RAST QL: NORMAL
DEPRECATED COW MILK IGE RAST QL: NORMAL
DEPRECATED D FARINAE IGE RAST QL: NORMAL
DEPRECATED D PTERONYSS IGE RAST QL: NORMAL
DEPRECATED DOG DANDER IGE RAST QL: NORMAL
DEPRECATED EGG WHITE IGE RAST QL: NORMAL
DEPRECATED ENGL PLANTAIN IGE RAST QL: NORMAL
DEPRECATED JOHNSON GRASS IGE RAST QL: NORMAL
DEPRECATED MARSH ELDER IGE RAST QL: NORMAL
DEPRECATED MUGWORT IGE RAST QL: NORMAL
DEPRECATED OAT IGE RAST QL: NORMAL
DEPRECATED PEANUT IGE RAST QL: NORMAL
DEPRECATED PECAN/HICK TREE IGE RAST QL: NORMAL
DEPRECATED ROACH IGE RAST QL: NORMAL
DEPRECATED S ROSTRATA IGE RAST QL: NORMAL
DEPRECATED SALTWORT IGE RAST QL: NORMAL
DEPRECATED SILVER BIRCH IGE RAST QL: NORMAL
DEPRECATED SOYBEAN IGE RAST QL: NORMAL
DEPRECATED TIMOTHY IGE RAST QL: NORMAL
DEPRECATED WHITE OAK IGE RAST QL: NORMAL
DOG DANDER IGE QN: <0.35 KU/L
EGG WHITE IGE QN: <0.35 KU/L
ENGL PLANTAIN IGE QN: <0.35 KU/L
JOHNSON GRASS IGE QN: <0.35 KU/L
MAPLE/SYCAMORE CLASS: NORMAL
MARSH ELDER IGE QN: <0.35 KU/L
MUGWORT IGE QN: <0.35 KU/L
OAT IGE QN: <0.35 KU/L
PEANUT IGE QN: <0.35 KU/L
PECAN/HICK TREE IGE QN: <0.35 KU/L
PENICILLIUM CLASS: NORMAL
RAGWEED, WESTERN IGE: <0.35 KU/L
RAGWEED, WESTERN, CLASS: NORMAL
ROACH IGE QN: <0.35 KU/L
S ROSTRATA IGE QN: <0.35 KU/L
SALTWORT IGE QN: <0.35 KU/L
SILVER BIRCH IGE QN: <0.35 KU/L
SOYBEAN IGE QN: <0.35 KU/L
TIMOTHY IGE QN: <0.35 KU/L
WALNUT TREE CLASS: NORMAL
WHEAT CLASS: NORMAL
WHITE OAK IGE QN: <0.35 KU/L
WHITE PINE CLASS: NORMAL
WILLOW CLASS: NORMAL

## 2017-04-11 ENCOUNTER — TELEPHONE (OUTPATIENT)
Dept: ALLERGY | Facility: CLINIC | Age: 42
End: 2017-04-11

## 2017-04-11 NOTE — TELEPHONE ENCOUNTER
Please let patient know all allergy tests were negative.  Immune system tests are good except not adequately protected against strep pneumoniae.  Recommend pneumovax and repeat labs.    Cough still is likely reflux so should continue protonix dialy

## 2017-04-12 LAB
C DIPHTHERIAE AB SER IA-ACNC: 2.94 IU/ML
C TETANI AB SER-ACNC: 5.12 IU/ML
DEPRECATED S PNEUM 1 IGG SER-MCNC: 1.2 MCG/ML
DEPRECATED S PNEUM12 IGG SER-MCNC: <0.3 MCG/ML
DEPRECATED S PNEUM14 IGG SER-MCNC: <0.3 MCG/ML
DEPRECATED S PNEUM19 IGG SER-MCNC: 4.5 MCG/ML
DEPRECATED S PNEUM23 IGG SER-MCNC: <0.3 MCG/ML
DEPRECATED S PNEUM3 IGG SER-MCNC: 0.8 MCG/ML
DEPRECATED S PNEUM4 IGG SER-MCNC: <0.3 MCG/ML
DEPRECATED S PNEUM5 IGG SER-MCNC: 1.3 MCG/ML
DEPRECATED S PNEUM8 IGG SER-MCNC: <0.3 MCG/ML
DEPRECATED S PNEUM9 IGG SER-MCNC: <0.3 MCG/ML
HAEM INFLU B IGG SER-MCNC: 0.45 MG/L
S PNEUM DA 18C IGG SER-MCNC: 0.7 MCG/ML
S PNEUM DA 6B IGG SER-MCNC: <0.3 MCG/ML
S PNEUM DA 7F IGG SER-MCNC: 0.3 MCG/ML
S PNEUM DA 9V IGG SER-MCNC: <0.3 MCG/ML

## 2017-04-18 ENCOUNTER — OFFICE VISIT (OUTPATIENT)
Dept: FAMILY MEDICINE | Facility: CLINIC | Age: 42
End: 2017-04-18
Payer: COMMERCIAL

## 2017-04-18 VITALS
HEIGHT: 67 IN | DIASTOLIC BLOOD PRESSURE: 83 MMHG | WEIGHT: 293 LBS | TEMPERATURE: 99 F | BODY MASS INDEX: 45.99 KG/M2 | SYSTOLIC BLOOD PRESSURE: 137 MMHG | HEART RATE: 56 BPM

## 2017-04-18 DIAGNOSIS — E66.01 MORBID OBESITY WITH BMI OF 50.0-59.9, ADULT: ICD-10-CM

## 2017-04-18 DIAGNOSIS — K21.9 GASTROESOPHAGEAL REFLUX DISEASE, ESOPHAGITIS PRESENCE NOT SPECIFIED: ICD-10-CM

## 2017-04-18 DIAGNOSIS — F41.9 ANXIETY: Primary | ICD-10-CM

## 2017-04-18 PROCEDURE — 1160F RVW MEDS BY RX/DR IN RCRD: CPT | Mod: S$GLB,,, | Performed by: PHYSICIAN ASSISTANT

## 2017-04-18 PROCEDURE — 3079F DIAST BP 80-89 MM HG: CPT | Mod: S$GLB,,, | Performed by: PHYSICIAN ASSISTANT

## 2017-04-18 PROCEDURE — 99999 PR PBB SHADOW E&M-EST. PATIENT-LVL III: CPT | Mod: PBBFAC,,, | Performed by: PHYSICIAN ASSISTANT

## 2017-04-18 PROCEDURE — 3075F SYST BP GE 130 - 139MM HG: CPT | Mod: S$GLB,,, | Performed by: PHYSICIAN ASSISTANT

## 2017-04-18 PROCEDURE — 99214 OFFICE O/P EST MOD 30 MIN: CPT | Mod: S$GLB,,, | Performed by: PHYSICIAN ASSISTANT

## 2017-04-18 RX ORDER — BUSPIRONE HYDROCHLORIDE 5 MG/1
5 TABLET ORAL 2 TIMES DAILY PRN
Qty: 60 TABLET | Refills: 2 | Status: SHIPPED | OUTPATIENT
Start: 2017-04-18 | End: 2019-01-09

## 2017-04-18 NOTE — PROGRESS NOTES
Subjective:       Patient ID: Lavinia Herrera is a 42 y.o. female.    Chief Complaint: Follow-up (anxiety and reflux)    HPI Comments: Mrs. Herrera is a 42 year old female who presents to clinic for follow up on anxiety. She has been doing much improved since last visit and reports increased interest in activities and decreased aggravation and snapping at her family members. She reports less anxiety, but continues to suffer with intermittent episodes of panic when her children are not at home. She denies depression or tearful episodes. She denies SI or HI. She states cough has improved since starting protonix. She underwent allergy work up, which was negative, with exception of insufficient protection for strep pneumoniae. She declines pneumonia vaccine today, although this was recommended by Allergy.     Review of Systems   Constitutional: Negative for activity change, appetite change, chills, fatigue and fever.   Eyes: Negative for visual disturbance.   Respiratory: Positive for cough. Negative for shortness of breath.    Cardiovascular: Negative for chest pain, palpitations and leg swelling.   Gastrointestinal: Negative for abdominal pain, constipation, diarrhea, nausea and vomiting.   Musculoskeletal: Negative for arthralgias.   Neurological: Negative for dizziness, weakness, light-headedness and headaches.   Psychiatric/Behavioral: Positive for decreased concentration. Negative for agitation, behavioral problems, self-injury, sleep disturbance and suicidal ideas. The patient is nervous/anxious. The patient is not hyperactive.        Objective:      Vitals:    04/18/17 0906   BP: 137/83   Pulse: (!) 56   Temp: 98.7 °F (37.1 °C)     Physical Exam   Constitutional: She is oriented to person, place, and time. She appears well-developed.   Obese body habitus.   HENT:   Head: Normocephalic and atraumatic.   Eyes: Conjunctivae and EOM are normal. Pupils are equal, round, and reactive to light.   Cardiovascular: Normal  rate, regular rhythm, normal heart sounds and intact distal pulses.    Pulmonary/Chest: Effort normal and breath sounds normal.   Neurological: She is alert and oriented to person, place, and time.   Skin: Skin is warm and dry.   Psychiatric: She has a normal mood and affect. Her speech is normal and behavior is normal.   Vitals reviewed.      Assessment:       1. Anxiety    2. Gastroesophageal reflux disease, esophagitis presence not specified    3. Morbid obesity with BMI of 50.0-59.9, adult        Plan:       Anxiety        - Improving control, continue lexapro 10 mg daily.  -     Add busPIRone (BUSPAR) 5 MG Tab; Take 1 tablet (5 mg total) by mouth 2 (two) times daily as needed (anxiety).  Dispense: 60 tablet; Refill: 2  - I discussed with the patient the risks, side effects and the benefits of the medication including the black box warning regarding suicidal ideation/risk if applicable.  I counseled the patient on medication titration, length of time before maximum benefits are reached, and duration of treatment expected.  I advised the patient to return to clinic or go to the emergency department if suicidal thoughts occur, thought of hurting others, hallucinations, or other serious symptoms.  Patient voiced no intention of self-harm.  The patient expressed verbal understanding and elected to proceed with treatment.  All questions were answered.      Gastroesophageal reflux disease, esophagitis presence not specified        - Continue protonix 40 mg daily for another 8 weeks then consider tapering off. If cough persists after taper then patient will need GI evaluation    Morbid obesity with BMI of 50.0-59.9, adult        - See below    Patient readiness: acceptance and barriers:none    During the course of the visit the patient was educated and counseled about the following:     Obesity:   Informal exercise measures discussed, e.g. taking stairs instead of elevator.    Goals: Obesity: Reduce calorie intake and  BMI    Did patient meet goals/outcomes: No    The following self management tools provided: declined    Patient Instructions (the written plan) was given to the patient/family.     Time spent with patient: 15 minutes     Follow up with PCP in 2 months as scheduled

## 2017-04-18 NOTE — MR AVS SNAPSHOT
[facility]  [address] [place] [address]  Phone: [phone]  Fax: [phone]                  Lavinia Herrera   2017 9:00 AM   Office Visit    Description:  Female : 1975   Provider:  Val Mayo PA-C   Department:  [facility]           Reason for Visit     Follow-up           Diagnoses this Visit        Comments    Anxiety    -  Primary     Gastroesophageal reflux disease, esophagitis presence not specified                To Do List           Future Appointments        Provider Department Dept Phone    2017 9:40 AM Cara Murguia MD [facility] [phone]      Goals (5 Years of Data)     None       These Medications        Disp Refills Start End    busPIRone (BUSPAR) 5 MG Tab 60 tablet 2 2017    Take 1 tablet (5 mg total) by mouth 2 (two) times daily as needed (anxiety). - Oral    Pharmacy: [id number]  [place], [place] - [address] AT [facility] [address] Ph #: [phone]         [facility]     [facility] Nurse Care Line -  Assistance  Unless otherwise directed by your provider, please contact [facility], our nurse care line that is available for  assistance.     Registered nurses in the [facility] provide: appointment scheduling, clinical advisement, health education, and other advisory services.  Call: [phone] (toll free)               Medications           Message regarding Medications     Verify the changes and/or additions to your medication regime listed below are the same as discussed with your clinician today.  If any of these changes or additions are incorrect, please notify your healthcare provider.        START taking these NEW medications        Refills    busPIRone (BUSPAR) 5 MG Tab 2    Sig: Take 1 tablet (5 mg total) by mouth 2 (two) times daily as needed (anxiety).    Class: Normal    Route: Oral           Verify  "that the below list of medications is an accurate representation of the medications you are currently taking.  If none reported, the list may be blank. If incorrect, please contact your healthcare provider. Carry this list with you in case of emergency.           Current Medications     B INFANTIS/B ANI/B MILLICENT/B BIFID (PROBIOTIC 4X ORAL) Take 2 tablets by mouth every evening.     escitalopram oxalate (LEXAPRO) 10 MG tablet Take 1 tablet (10 mg total) by mouth once daily.    levonorgestrel (MIRENA) 20 mcg/24 hr (5 years) IUD 1 each by Intrauterine route once.    meloxicam (MOBIC) 15 MG tablet Take 1 tablet (15 mg total) by mouth once daily.    metformin (GLUCOPHAGE-XR) 500 MG 24 hr tablet Take 1 tablet (500 mg total) by mouth daily with breakfast.    pantoprazole (PROTONIX) 40 MG tablet Take 1 tablet (40 mg total) by mouth once daily.    busPIRone (BUSPAR) 5 MG Tab Take 1 tablet (5 mg total) by mouth 2 (two) times daily as needed (anxiety).           Clinical Reference Information           Your Vitals Were     BP Pulse Temp Height Weight BMI    137/83 (BP Location: Right arm, Patient Position: Sitting, BP Method: Automatic) 56 98.7 °F (37.1 °C) (Oral) 5' 7" (1.702 m) 170.6 kg (376 lb 1.7 oz) 58.91 kg/m2      Blood Pressure          Most Recent Value    BP  137/83      Allergies as of 4/18/2017     No Known Allergies      Immunizations Administered on Date of Encounter - 4/18/2017     None      Instructions      Controlling High Blood Pressure  High blood pressure (hypertension) is often called the silent killer. This is because many people who have it dont know it. High blood pressure is defined as 140/90 mm Hg or higher. Know your blood pressure and remember to check it regularly. Doing so can save your life. Here are some things you can do to help control your blood pressure.    Choose heart-healthy foods  · Select low-salt, low-fat foods. Limit sodium intake to 2,400 mg per day or the amount suggested by your " healthcare provider.  · Limit canned, dried, cured, packaged, and fast foods. These can contain a lot of salt.  · Eat 8 to 10 servings of fruits and vegetables every day.  · Choose lean meats, fish, or chicken.  · Eat whole-grain pasta, brown rice, and beans.  · Eat 2 to 3 servings of low-fat or fat-free dairy products.  · Ask your doctor about the DASH eating plan. This plan helps reduce blood pressure.  · When you go to a restaurant, ask that your meal be prepared with no added salt.  Maintain a healthy weight  · Ask your healthcare provider how many calories to eat a day. Then stick to that number.  · Ask your healthcare provider what weight range is healthiest for you. If you are overweight, a weight loss of only 3% to 5% of your body weight can help lower blood pressure. Generally, a good weight loss goal is to lose 10% of your body weight in a year.  · Limit snacks and sweets.  · Get regular exercise.  Get up and get active  · Choose activities you enjoy. Find ones you can do with friends or family. This includes bicycling, dancing, walking, and jogging.  · Park farther away from building entrances.  · Use stairs instead of the elevator.  · When you can, walk or bike instead of driving.  · [place] leaves, garden, or do household repairs.  · Be active at a moderate to vigorous level of physical activity for at least 40 minutes for a minimum of 3 to 4 days a week.   Manage stress  · Make time to relax and enjoy life. Find time to laugh.  · Communicate your concerns with your loved ones and your healthcare provider.  · Visit with family and friends, and keep up with hobbies.  Limit alcohol and quit smoking  · Men should have no more than 2 drinks per day.  · Women should have no more than 1 drink per day.  · Talk with your healthcare provider about quitting smoking. Smoking significantly increases your risk for heart disease and stroke. Ask your healthcare provider about community smoking cessation programs and  other options.  Medicines  If lifestyle changes arent enough, your healthcare provider may prescribe high blood pressure medicine. Take all medicines as prescribed. If you have any questions about your medicines, ask your healthcare provider before stopping or changing them.   Date Last Reviewed: 4/27/2016 © 2000-2016 "[employer]. [address] [place] [address], [place] [address]. All rights reserved. This information is not intended as a substitute for professional medical care. Always follow your healthcare professional's instructions.        Weight Management: Getting Started  Healthy bodies come in all shapes and sizes. Not all bodies are made to be thin. For some people, a healthy weight is higher than the average weight listed on weight charts. Your healthcare provider can help you decide on a healthy weight for you.    Reasons to lose weight  Losing weight can help with some health problems, such as high blood pressure, heart disease, diabetes, sleep apnea, and arthritis. You may also feel more energy.  Set your long-term goal  Your goal doesn't even have to be a specific weight. You may decide on a fitness goal (such as being able to walk 10 miles a week), or a health goal (such as lowering your blood pressure). Choose a goal that is measurable and reasonable, so you know when you've reached it. A goal of reaching a BMI of less than 25 is not always reasonable (or possible).   Make an action plan  Habits dont change overnight. Setting your goals too high can leave you feeling discouraged if you cant reach them. Be realistic. Choose one or two small changes you can make now. Set an action plan for how you are going to make these changes. When you can stick to this plan, keep making a few more small changes. Taking small steps will help you stay on the path to success.  Track your progress  Write down your goals. Then, keep a daily record of your progress. Write down what you eat and how active you  are. This record lets you look back on how much youve done. It may also help when youre feeling frustrated. Reward yourself for success. Even if you dont reach every goal, give yourself credit for what you do get done.  Get support  Encouragement from others can help make losing weight easier. Ask your family members and friends for support. They may even want to join you. Also look to your healthcare provider, registered dietitian, and  for help. Your local hospital can give you more information about nutrition, exercise, and weight loss.  Date Last Reviewed: 1/31/2016  © 1965-9136 [employer] [address] [place] [address], [place] [address]. All rights reserved. This information is not intended as a substitute for professional medical care. Always follow your healthcare professional's instructions.        Walking for Fitness  Fitness walking has something for everyone, even people who are already fit. Walking is one of the safest ways to condition your body aerobically. It can boost energy, help you lose weight, and reduce stress.    Physical benefits  · Walking strengthens your heart and lungs, and tones your muscles.  · When walking, your feet land with less impact than in other sports. This reduces chances of muscle, bone, and joint injury.  · Regular walking improves your cholesterol levels and lowers your risk of heart disease. And it helps you control your blood sugar if you have diabetes.  · Walking is a weight-bearing activity, which helps maintain bone density. This can help prevent osteoporosis.  Personal rewards  · Taking walks can help you relax and manage stress. And fitness walking may make you feel better about yourself.  · Walking can help you sleep better at night and make you less likely to be depressed.  · Regular walking may help maintain your memory as you get older.  · Walking is a great way to spend extra time with friends and family members. Be sure to invite  your dog along!  Q&A about fitness walking  Q: Will walking keep me fit?  A: Yes. Regular walking at the right pace gives you all the benefits of other aerobic activities, such as jogging and swimming.  Q: Will walking help me lose weight and keep it off?  A: Yes. Per mile, walking can burn as many calories as jogging. Your health care provider can help work walking into your weight-loss plan.  Q: Is walking safe for my health?  A: Yes. Walking is safe if you have high blood pressure, diabetes, heart disease, or other conditions. Talk to your health care provider before you start.  Date Last Reviewed: 5/9/2015  © 3258-6759 [employer]. [address] [place] [address], [place] [address]. All rights reserved. This information is not intended as a substitute for professional medical care. Always follow your healthcare professional's instructions.             Language Assistance Services     ATTENTION: Language assistance services are available, free of charge. Please call [phone].      ATENCIÓN: Si habla vee, tiene a salgado disposición servicios gratuitos de asistencia lingüística. Llame al [phone].     GALILEO Ý: N?u b?n nói Ti?ng Vi?t, có các d?ch v? h? tr? ngôn ng? mi?n phí dành cho b?n. G?i s? [phone].         [facility] complies with applicable Federal civil rights laws and does not discriminate on the basis of race, color, national origin, age, disability, or sex.

## 2017-06-07 ENCOUNTER — DOCUMENTATION ONLY (OUTPATIENT)
Dept: FAMILY MEDICINE | Facility: CLINIC | Age: 42
End: 2017-06-07

## 2017-06-07 NOTE — PROGRESS NOTES
Pre-Visit Chart Review  For Appointment Scheduled on 6/13/17.    There are no preventive care reminders to display for this patient.

## 2017-06-13 ENCOUNTER — LAB VISIT (OUTPATIENT)
Dept: LAB | Facility: HOSPITAL | Age: 42
End: 2017-06-13
Attending: FAMILY MEDICINE
Payer: COMMERCIAL

## 2017-06-13 ENCOUNTER — OFFICE VISIT (OUTPATIENT)
Dept: FAMILY MEDICINE | Facility: CLINIC | Age: 42
End: 2017-06-13
Payer: COMMERCIAL

## 2017-06-13 VITALS
TEMPERATURE: 98 F | HEIGHT: 67 IN | WEIGHT: 293 LBS | HEART RATE: 50 BPM | SYSTOLIC BLOOD PRESSURE: 130 MMHG | BODY MASS INDEX: 45.99 KG/M2 | DIASTOLIC BLOOD PRESSURE: 79 MMHG

## 2017-06-13 DIAGNOSIS — F41.9 ANXIETY AND DEPRESSION: Primary | ICD-10-CM

## 2017-06-13 DIAGNOSIS — R73.01 IMPAIRED FASTING BLOOD SUGAR: ICD-10-CM

## 2017-06-13 DIAGNOSIS — R79.89 ELEVATED TSH: ICD-10-CM

## 2017-06-13 DIAGNOSIS — F32.A ANXIETY AND DEPRESSION: Primary | ICD-10-CM

## 2017-06-13 DIAGNOSIS — E66.01 MORBID OBESITY WITH BMI OF 50.0-59.9, ADULT: ICD-10-CM

## 2017-06-13 DIAGNOSIS — K21.9 GASTROESOPHAGEAL REFLUX DISEASE, ESOPHAGITIS PRESENCE NOT SPECIFIED: ICD-10-CM

## 2017-06-13 LAB
ALBUMIN SERPL BCP-MCNC: 3.6 G/DL
ALP SERPL-CCNC: 56 U/L
ALT SERPL W/O P-5'-P-CCNC: 16 U/L
ANION GAP SERPL CALC-SCNC: 13 MMOL/L
AST SERPL-CCNC: 16 U/L
BASOPHILS # BLD AUTO: 0.02 K/UL
BASOPHILS NFR BLD: 0.3 %
BILIRUB SERPL-MCNC: 0.4 MG/DL
BUN SERPL-MCNC: 15 MG/DL
CALCIUM SERPL-MCNC: 9.6 MG/DL
CHLORIDE SERPL-SCNC: 103 MMOL/L
CHOLEST/HDLC SERPL: 3.5 {RATIO}
CO2 SERPL-SCNC: 26 MMOL/L
CREAT SERPL-MCNC: 0.9 MG/DL
DIFFERENTIAL METHOD: NORMAL
EOSINOPHIL # BLD AUTO: 0.2 K/UL
EOSINOPHIL NFR BLD: 2.1 %
ERYTHROCYTE [DISTWIDTH] IN BLOOD BY AUTOMATED COUNT: 13.1 %
EST. GFR  (AFRICAN AMERICAN): >60 ML/MIN/1.73 M^2
EST. GFR  (NON AFRICAN AMERICAN): >60 ML/MIN/1.73 M^2
ESTIMATED AVG GLUCOSE: 131 MG/DL
GLUCOSE SERPL-MCNC: 101 MG/DL
HBA1C MFR BLD HPLC: 6.2 %
HCT VFR BLD AUTO: 40 %
HDL/CHOLESTEROL RATIO: 28.7 %
HDLC SERPL-MCNC: 195 MG/DL
HDLC SERPL-MCNC: 56 MG/DL
HGB BLD-MCNC: 12.8 G/DL
LDLC SERPL CALC-MCNC: 125.2 MG/DL
LYMPHOCYTES # BLD AUTO: 1.6 K/UL
LYMPHOCYTES NFR BLD: 21.4 %
MCH RBC QN AUTO: 29.8 PG
MCHC RBC AUTO-ENTMCNC: 32 %
MCV RBC AUTO: 93 FL
MONOCYTES # BLD AUTO: 0.7 K/UL
MONOCYTES NFR BLD: 9 %
NEUTROPHILS # BLD AUTO: 5 K/UL
NEUTROPHILS NFR BLD: 67.1 %
NONHDLC SERPL-MCNC: 139 MG/DL
PLATELET # BLD AUTO: 261 K/UL
PMV BLD AUTO: 11.1 FL
POTASSIUM SERPL-SCNC: 4.7 MMOL/L
PROT SERPL-MCNC: 7.6 G/DL
RBC # BLD AUTO: 4.29 M/UL
SODIUM SERPL-SCNC: 142 MMOL/L
TRIGL SERPL-MCNC: 69 MG/DL
TSH SERPL DL<=0.005 MIU/L-ACNC: 3.33 UIU/ML
WBC # BLD AUTO: 7.52 K/UL

## 2017-06-13 PROCEDURE — 83036 HEMOGLOBIN GLYCOSYLATED A1C: CPT

## 2017-06-13 PROCEDURE — 99999 PR PBB SHADOW E&M-EST. PATIENT-LVL III: CPT | Mod: PBBFAC,,, | Performed by: FAMILY MEDICINE

## 2017-06-13 PROCEDURE — 84443 ASSAY THYROID STIM HORMONE: CPT

## 2017-06-13 PROCEDURE — 99214 OFFICE O/P EST MOD 30 MIN: CPT | Mod: S$GLB,,, | Performed by: FAMILY MEDICINE

## 2017-06-13 PROCEDURE — 36415 COLL VENOUS BLD VENIPUNCTURE: CPT | Mod: PO

## 2017-06-13 PROCEDURE — 80061 LIPID PANEL: CPT

## 2017-06-13 PROCEDURE — 80053 COMPREHEN METABOLIC PANEL: CPT

## 2017-06-13 PROCEDURE — 85025 COMPLETE CBC W/AUTO DIFF WBC: CPT

## 2017-06-13 RX ORDER — ESCITALOPRAM OXALATE 10 MG/1
10 TABLET ORAL DAILY
Qty: 90 TABLET | Refills: 3 | Status: SHIPPED | OUTPATIENT
Start: 2017-06-13 | End: 2018-05-07 | Stop reason: SDUPTHER

## 2017-06-13 RX ORDER — FLUTICASONE PROPIONATE 50 MCG
1 SPRAY, SUSPENSION (ML) NASAL DAILY PRN
COMMUNITY
End: 2017-11-06

## 2017-06-13 RX ORDER — PANTOPRAZOLE SODIUM 40 MG/1
40 TABLET, DELAYED RELEASE ORAL DAILY
Qty: 90 TABLET | Refills: 3 | Status: SHIPPED | OUTPATIENT
Start: 2017-06-13 | End: 2018-06-30 | Stop reason: SDUPTHER

## 2017-06-19 ENCOUNTER — PATIENT MESSAGE (OUTPATIENT)
Dept: FAMILY MEDICINE | Facility: CLINIC | Age: 42
End: 2017-06-19

## 2017-06-19 NOTE — PROGRESS NOTES
CHIEF COMPLAINT:  Follow up      HISTORY OF PRESENT ILLNESS:  Lavinia Herrera is a 42 y.o. female who presents to clinic as a for follow up. She was recently started on lexapro 10 mg daily.  She denies any side effects. She is also concerned that she may have right ear infection as she has had pain and some popping, plugging over her right ear.  She is also due for repeat lab work.       REVIEW OF SYSTEMS:  The patient denies any fever, chills, night sweats, headaches, vision changes, difficulty speaking or swallowing, decreased hearing, weight loss, weight gain, chest pain, palpitations, shortness of breath, cough, nausea, vomiting, abdominal pain, dysuria, diarrhea, constipation, hematuria, hematochezia, melena, changes in her hair, skin, nails, numbness or weakness in her extremities, erythema, pain or swelling over any of her joints, myalgia, swollen glands, easy bruising, fatigue, edema, symptoms of anxiety or depression.       MEDICATIONS:   Reviewed and/or reconciled in EPIC    ALLERGIES:  Reviewed and/or reconciled in [facility]    PAST MEDICAL/SURGICAL HISTORY:   Past Medical History:   Diagnosis Date    Allergy     Morbid obesity     Otitis media     Seasonal allergies       Past Surgical History:   Procedure Laterality Date    MANDIBLE OSTEOTOMY      TYMPANOSTOMY TUBE PLACEMENT  6/30/14       FAMILY HISTORY:    Family History   Problem Relation Age of Onset    Hypertension Mother     Asthma Mother     Arthritis Mother     Diabetes Maternal Grandmother     Heart disease Maternal Grandmother      CHF    Breast cancer Neg Hx     Colon cancer Neg Hx     Ovarian cancer Neg Hx     Cancer Neg Hx     Allergic rhinitis Neg Hx     Allergies Neg Hx     Angioedema Neg Hx     Atopy Neg Hx     Eczema Neg Hx     Immunodeficiency Neg Hx     Rhinitis Neg Hx     Urticaria Neg Hx        SOCIAL HISTORY:    Social History     Social History    Marital status:      Spouse name: N/A    Number of  "children: N/A    Years of education: N/A     Occupational History    Not on file.     Social History Main Topics    Smoking status: Former Smoker     Quit date: 4/3/1998    Smokeless tobacco: Never Used    Alcohol use Yes      Comment: occasional    Drug use: No    Sexual activity: Yes     Partners: Male     Birth control/ protection: None     Other Topics Concern    Not on file     Social History Narrative    No narrative on file       PHYSICAL EXAM:  VITAL SIGNS:   Vitals:    06/13/17 0959   BP: 130/79   BP Location: Right arm   Patient Position: Sitting   BP Method: Automatic   Pulse: (!) 50   Temp: 98.3 °F (36.8 °C)   TempSrc: Oral   Weight: (!) 172.3 kg (379 lb 13.6 oz)   Height: 5' 7" (1.702 m)     GENERAL:  Patient appears well nourished, sitting on exam table, in no acute distress.  HEENT:  Atraumatic, normocephalic, PERRLA, EOMI, no conjunctival injection, sclerae are anicteric, normal external auditory canals,TMs clear b/l, gross hearing intact to whisper, MMM, no oropharygneal erythema or exudate.  NECK:  Supple, normal ROM, trachea is midline , no supraclavicular or cervical LAD or masses palpated.  Thyroid gland not palpable.  CARDIOVASCULAR:  RRR, normal S1 and S2, no m/r/g.  RESPIRATORY:  CTA b/l, no wheezes, rhonchi, rales.  No increased work of breathing, no  use of accessory muscles.  ABDOMEN:  Soft, nontender, nondistended, normoactive bowel sounds in all four quadrants, no rebound or guarding, no HSM or masses palpated.  Normal percussion.  EXTREMITIES:  2+ DP pulses b/l, no edema.  SKIN:  Warm, no lesions on exposed skin.  NEUROMUSCULAR:  Cranial nerves II-XII grossly intact.  Strength is 4+/5 over upper and lower extremity flexors/extensors b/l, 2+ biceps and patellar reflexes b/l. No clubbing or cyanosis of digits/nails.  Steady gait.  PSYCH:  Patient is alert and oriented to person, time, place. They are appropriately dressed and groomed. There is normal eye contact. Rate and tone of " speech is normal. Normal insight, judgement. Normal thought content and process.     LABORATORY/IMAGING STUDIES: pending    ASSESSMENT/PLAN: This is a 42 y.o. female who presents to clinic for evaluation of the following concerns  1. Anxiety and depression  Continue with current dose of lexapro  - escitalopram oxalate (LEXAPRO) 10 MG tablet; Take 1 tablet (10 mg total) by mouth once daily.  Dispense: 90 tablet; Refill: 3    2. Morbid obesity with BMI of 50.0-59.9, adult  - CBC auto differential; Future  - Lipid panel; Future  - TSH; Future  - Comprehensive metabolic panel; Future  - Hemoglobin A1c; Future    3. Impaired fasting blood sugar  - CBC auto differential; Future  - Lipid panel; Future  - TSH; Future  - Comprehensive metabolic panel; Future  - Hemoglobin A1c; Future    4. Gastroesophageal reflux disease, esophagitis presence not specified  - pantoprazole (PROTONIX) 40 MG tablet; Take 1 tablet (40 mg total) by mouth once daily.  Dispense: 90 tablet; Refill: 3    5. Elevated TSH  - TSH; Future    Patient readiness: acceptance and barriers:none    During the course of the visit the patient was educated and counseled about the following:     Obesity:   General weight loss/lifestyle modification strategies discussed (elicit support from others; identify saboteurs; non-food rewards, etc).  Diet interventions: moderate (500 kCal/d) deficit diet.  Informal exercise measures discussed, e.g. taking stairs instead of elevator.  Regular aerobic exercise program discussed.    Goals: Obesity: Reduce calorie intake and BMI    Did patient meet goals/outcomes: No    The following self management tools provided: declined    Patient Instructions (the written plan) was given to the patient/family.     Time spent with patient: 30 minutes        Cara Murguia MD

## 2017-06-30 ENCOUNTER — PATIENT MESSAGE (OUTPATIENT)
Dept: FAMILY MEDICINE | Facility: CLINIC | Age: 42
End: 2017-06-30

## 2017-06-30 NOTE — TELEPHONE ENCOUNTER
Lab work normal except that her FBG and HgA1c are still within the prediabetic range. She needs to eat a low carb diet, work on weight loss and we will recheck this in 6 months.

## 2017-07-02 RX ORDER — METFORMIN HYDROCHLORIDE 500 MG/1
TABLET, EXTENDED RELEASE ORAL
Qty: 90 TABLET | Refills: 3 | Status: SHIPPED | OUTPATIENT
Start: 2017-07-02 | End: 2018-07-05 | Stop reason: SDUPTHER

## 2017-07-11 RX ORDER — MELOXICAM 15 MG/1
TABLET ORAL
Qty: 30 TABLET | Refills: 0 | Status: SHIPPED | OUTPATIENT
Start: 2017-07-11 | End: 2017-08-10 | Stop reason: SDUPTHER

## 2017-08-04 ENCOUNTER — PATIENT MESSAGE (OUTPATIENT)
Dept: FAMILY MEDICINE | Facility: CLINIC | Age: 42
End: 2017-08-04

## 2017-08-04 NOTE — TELEPHONE ENCOUNTER
What are they not paying for ?  The office visit or the lab work ?    The main diagnosis code for the office  visit is anxiety and depression, not obesity.  Obesity is listed as one code because this clinic is required to code the weight on all underweight, obese, or morbidly obese patients due to our status as a patient centered medical home.    Her labs are tied to the diagnosis of impaired fasting blood sugar, elevated, TSH and morbid obesity.

## 2017-08-10 RX ORDER — MELOXICAM 15 MG/1
TABLET ORAL
Qty: 30 TABLET | Refills: 0 | Status: SHIPPED | OUTPATIENT
Start: 2017-08-10 | End: 2017-09-10 | Stop reason: SDUPTHER

## 2017-09-10 RX ORDER — MELOXICAM 15 MG/1
TABLET ORAL
Qty: 30 TABLET | Refills: 0 | Status: SHIPPED | OUTPATIENT
Start: 2017-09-10 | End: 2017-10-08 | Stop reason: SDUPTHER

## 2017-10-09 RX ORDER — MELOXICAM 15 MG/1
TABLET ORAL
Qty: 30 TABLET | Refills: 0 | Status: SHIPPED | OUTPATIENT
Start: 2017-10-09 | End: 2017-11-05 | Stop reason: SDUPTHER

## 2017-11-04 ENCOUNTER — TELEPHONE (OUTPATIENT)
Dept: FAMILY MEDICINE | Facility: CLINIC | Age: 42
End: 2017-11-04

## 2017-11-04 NOTE — TELEPHONE ENCOUNTER
Called pt. Need to reschedule appt with Dr. Murguia on 12/13/17. Dr. Murguia is not in clinic that day. Rescheduled to 11/6/17 with Dr. Murguia. Thanks, Brenna

## 2017-11-06 ENCOUNTER — DOCUMENTATION ONLY (OUTPATIENT)
Dept: FAMILY MEDICINE | Facility: CLINIC | Age: 42
End: 2017-11-06

## 2017-11-06 ENCOUNTER — OFFICE VISIT (OUTPATIENT)
Dept: FAMILY MEDICINE | Facility: CLINIC | Age: 42
End: 2017-11-06
Payer: COMMERCIAL

## 2017-11-06 VITALS
TEMPERATURE: 99 F | SYSTOLIC BLOOD PRESSURE: 126 MMHG | HEIGHT: 67 IN | WEIGHT: 293 LBS | BODY MASS INDEX: 45.99 KG/M2 | DIASTOLIC BLOOD PRESSURE: 74 MMHG | HEART RATE: 64 BPM

## 2017-11-06 DIAGNOSIS — Z12.39 SCREENING FOR BREAST CANCER: ICD-10-CM

## 2017-11-06 DIAGNOSIS — H92.09 OTALGIA, UNSPECIFIED LATERALITY: ICD-10-CM

## 2017-11-06 DIAGNOSIS — R73.01 IMPAIRED FASTING BLOOD SUGAR: Primary | ICD-10-CM

## 2017-11-06 DIAGNOSIS — Z96.22 PATENT PRESSURE EQUALIZATION (PE) TUBE, LEFT: ICD-10-CM

## 2017-11-06 DIAGNOSIS — E66.01 MORBID OBESITY WITH BMI OF 50.0-59.9, ADULT: ICD-10-CM

## 2017-11-06 PROCEDURE — 99999 PR PBB SHADOW E&M-EST. PATIENT-LVL III: CPT | Mod: PBBFAC,,, | Performed by: FAMILY MEDICINE

## 2017-11-06 PROCEDURE — 99214 OFFICE O/P EST MOD 30 MIN: CPT | Mod: S$GLB,,, | Performed by: FAMILY MEDICINE

## 2017-11-06 RX ORDER — MELOXICAM 15 MG/1
TABLET ORAL
Qty: 30 TABLET | Refills: 0 | Status: SHIPPED | OUTPATIENT
Start: 2017-11-06 | End: 2017-12-05 | Stop reason: SDUPTHER

## 2017-11-06 NOTE — PROGRESS NOTES
Pre-Visit Chart Review  For Appointment Scheduled on 11/6/17.    Health Maintenance Due   Topic Date Due    Influenza Vaccine  08/01/2017    Mammogram  12/16/2017

## 2017-11-06 NOTE — PROGRESS NOTES
CHIEF COMPLAINT:  Follow up      HISTORY OF PRESENT ILLNESS:  Lavinia Herrera is a 42 y.o. female who presents to clinic as a for follow up. She has impaired fasting blood sugar and is due for repeat labs. She is due for a screening mammogram. She has otalgia and left sided PE tube is in the EAC-she has not followed up with ENT     REVIEW OF SYSTEMS:  The patient denies any fever, chills, night sweats, headaches, vision changes, difficulty speaking or swallowing, decreased hearing, weight loss, weight gain, chest pain, palpitations, shortness of breath, cough, nausea, vomiting, abdominal pain, dysuria, diarrhea, constipation, hematuria, hematochezia, melena, changes in her hair, skin, nails, numbness or weakness in her extremities, erythema, pain or swelling over any of her joints, myalgia, swollen glands, easy bruising, fatigue, edema, symptoms of anxiety or depression.       MEDICATIONS:   Reviewed and/or reconciled in EPIC    ALLERGIES:  Reviewed and/or reconciled in EPIC    PAST MEDICAL/SURGICAL HISTORY:   Past Medical History:   Diagnosis Date    Allergy     Morbid obesity     Otitis media     Seasonal allergies       Past Surgical History:   Procedure Laterality Date    MANDIBLE OSTEOTOMY      TYMPANOSTOMY TUBE PLACEMENT  6/30/14       FAMILY HISTORY:    Family History   Problem Relation Age of Onset    Hypertension Mother     Asthma Mother     Arthritis Mother     Diabetes Maternal Grandmother     Heart disease Maternal Grandmother      CHF    Breast cancer Neg Hx     Colon cancer Neg Hx     Ovarian cancer Neg Hx     Cancer Neg Hx     Allergic rhinitis Neg Hx     Allergies Neg Hx     Angioedema Neg Hx     Atopy Neg Hx     Eczema Neg Hx     Immunodeficiency Neg Hx     Rhinitis Neg Hx     Urticaria Neg Hx        SOCIAL HISTORY:    Social History     Social History    Marital status:      Spouse name: N/A    Number of children: N/A    Years of education: N/A     Occupational  "History    Not on file.     Social History Main Topics    Smoking status: Former Smoker     Quit date: 4/3/1998    Smokeless tobacco: Never Used    Alcohol use Yes      Comment: occasional    Drug use: No    Sexual activity: Yes     Partners: Male     Birth control/ protection: None     Other Topics Concern    Not on file     Social History Narrative    No narrative on file       PHYSICAL EXAM:  VITAL SIGNS:   Vitals:    11/06/17 1657   BP: 126/74   BP Location: Right arm   Patient Position: Sitting   BP Method: Small (Automatic)   Pulse: 64   Temp: 98.7 °F (37.1 °C)   TempSrc: Oral   Weight: (!) 173.7 kg (382 lb 15 oz)   Height: 5' 7" (1.702 m)     GENERAL:  Patient appears well nourished, sitting on exam table, in no acute distress.  HEENT:  Atraumatic, normocephalic, PERRLA, EOMI, no conjunctival injection, sclerae are anicteric, normal external auditory canals,TMs clear b/l, PE tube in left EAC, gross hearing intact to whisper, MMM, no oropharygneal erythema or exudate.  NECK:  Supple, normal ROM, trachea is midline , no supraclavicular or cervical LAD or masses palpated.  Thyroid gland not palpable.  CARDIOVASCULAR:  RRR, normal S1 and S2, no m/r/g.  RESPIRATORY:  CTA b/l, no wheezes, rhonchi, rales.  No increased work of breathing, no  use of accessory muscles.  ABDOMEN:  Soft, nontender, nondistended, normoactive bowel sounds in all four quadrants, no rebound or guarding, no HSM or masses palpated.  Normal percussion.  EXTREMITIES:  2+ DP pulses b/l, no edema.  SKIN:  Warm, no lesions on exposed skin.  NEUROMUSCULAR:  Cranial nerves II-XII grossly intact.   No clubbing or cyanosis of digits/nails.  Steady gait.  PSYCH:  Patient is alert and oriented to person, time, place. They are appropriately dressed and groomed. There is normal eye contact. Rate and tone of speech is normal. Normal insight, judgement. Normal thought content and process.     LABORATORY/IMAGING STUDIES: pending    ASSESSMENT/PLAN: " This is a 42 y.o. female who presents to clinic for evaluation of the following concerns  1. Impaired fasting blood sugar  - Glucose, fasting; Future  - Hemoglobin A1c; Future    2. Screening for breast cancer  - Mammo Digital Screening Bilat with CAD; Future    3. Morbid obesity with BMI of 50.0-59.9, adult  See below    4. BMI 50.0-59.9, adult  See  below    5. Otalgia, unspecified laterality  - Ambulatory referral to ENT    6. Patent pressure equalization (PE) tube, left  - Ambulatory referral to ENT      Patient readiness: acceptance and barriers:none    During the course of the visit the patient was educated and counseled about the following:     Obesity:   General weight loss/lifestyle modification strategies discussed (elicit support from others; identify saboteurs; non-food rewards, etc).  Diet interventions: moderate (500 kCal/d) deficit diet.  Informal exercise measures discussed, e.g. taking stairs instead of elevator.  Regular aerobic exercise program discussed.    Goals: Obesity: Reduce calorie intake and BMI    Did patient meet goals/outcomes: No    The following self management tools provided: declined    Patient Instructions (the written plan) was given to the patient/family.     Time spent with patient: 30 minutes        Cara Murguia MD

## 2017-11-20 PROBLEM — K59.00 CONSTIPATION: Status: ACTIVE | Noted: 2017-11-20

## 2017-12-05 RX ORDER — MELOXICAM 15 MG/1
TABLET ORAL
Qty: 30 TABLET | Refills: 0 | Status: SHIPPED | OUTPATIENT
Start: 2017-12-05 | End: 2018-01-07 | Stop reason: SDUPTHER

## 2017-12-18 ENCOUNTER — HOSPITAL ENCOUNTER (OUTPATIENT)
Dept: RADIOLOGY | Facility: CLINIC | Age: 42
Discharge: HOME | End: 2017-12-18
Attending: FAMILY MEDICINE
Payer: COMMERCIAL

## 2017-12-18 DIAGNOSIS — Z12.39 SCREENING FOR BREAST CANCER: ICD-10-CM

## 2017-12-18 PROCEDURE — 77067 SCR MAMMO BI INCL CAD: CPT | Mod: 26,,, | Performed by: RADIOLOGY

## 2017-12-18 PROCEDURE — 77063 BREAST TOMOSYNTHESIS BI: CPT | Mod: 26,,, | Performed by: RADIOLOGY

## 2017-12-18 PROCEDURE — 77067 SCR MAMMO BI INCL CAD: CPT | Mod: TC,PO

## 2018-01-08 RX ORDER — MELOXICAM 15 MG/1
TABLET ORAL
Qty: 30 TABLET | Refills: 0 | Status: SHIPPED | OUTPATIENT
Start: 2018-01-08 | End: 2018-02-12 | Stop reason: SDUPTHER

## 2018-02-12 RX ORDER — MELOXICAM 15 MG/1
TABLET ORAL
Qty: 30 TABLET | Refills: 0 | Status: SHIPPED | OUTPATIENT
Start: 2018-02-12 | End: 2018-07-02 | Stop reason: SDUPTHER

## 2018-04-18 ENCOUNTER — TELEPHONE (OUTPATIENT)
Dept: FAMILY MEDICINE | Facility: CLINIC | Age: 43
End: 2018-04-18

## 2018-04-18 NOTE — TELEPHONE ENCOUNTER
Called pt. Need to reschedule appt with Dr. Murguia on 5/7/18. Rescheduled to 5/7/18 with Ms. Marcella PA-C. Also rescheduled labs that pt missed after last appt. Thanks, Brenna

## 2018-04-23 ENCOUNTER — LAB VISIT (OUTPATIENT)
Dept: LAB | Facility: HOSPITAL | Age: 43
End: 2018-04-23
Attending: FAMILY MEDICINE
Payer: COMMERCIAL

## 2018-04-23 DIAGNOSIS — R73.01 IMPAIRED FASTING BLOOD SUGAR: ICD-10-CM

## 2018-04-23 LAB
ESTIMATED AVG GLUCOSE: 123 MG/DL
GLUCOSE SERPL-MCNC: 108 MG/DL
HBA1C MFR BLD HPLC: 5.9 %

## 2018-04-23 PROCEDURE — 36415 COLL VENOUS BLD VENIPUNCTURE: CPT | Mod: PO

## 2018-04-23 PROCEDURE — 82947 ASSAY GLUCOSE BLOOD QUANT: CPT

## 2018-04-23 PROCEDURE — 83036 HEMOGLOBIN GLYCOSYLATED A1C: CPT

## 2018-05-04 ENCOUNTER — DOCUMENTATION ONLY (OUTPATIENT)
Dept: FAMILY MEDICINE | Facility: CLINIC | Age: 43
End: 2018-05-04

## 2018-05-04 NOTE — PROGRESS NOTES
Pre-Visit Chart Review  For Appointment Scheduled on 05/04/18    Health Maintenance Due   Topic Date Due    Pap Smear with HPV Cotest  05/08/2018

## 2018-05-07 ENCOUNTER — OFFICE VISIT (OUTPATIENT)
Dept: FAMILY MEDICINE | Facility: CLINIC | Age: 43
End: 2018-05-07
Payer: COMMERCIAL

## 2018-05-07 VITALS
SYSTOLIC BLOOD PRESSURE: 134 MMHG | HEIGHT: 67 IN | HEART RATE: 57 BPM | OXYGEN SATURATION: 97 % | BODY MASS INDEX: 45.99 KG/M2 | DIASTOLIC BLOOD PRESSURE: 84 MMHG | TEMPERATURE: 98 F | WEIGHT: 293 LBS

## 2018-05-07 DIAGNOSIS — F32.A ANXIETY AND DEPRESSION: Primary | ICD-10-CM

## 2018-05-07 DIAGNOSIS — Z12.4 SCREENING FOR CERVICAL CANCER: ICD-10-CM

## 2018-05-07 DIAGNOSIS — F41.9 ANXIETY AND DEPRESSION: Primary | ICD-10-CM

## 2018-05-07 DIAGNOSIS — E66.01 MORBID OBESITY WITH BMI OF 60.0-69.9, ADULT: ICD-10-CM

## 2018-05-07 DIAGNOSIS — B35.1 ONYCHOMYCOSIS: ICD-10-CM

## 2018-05-07 PROCEDURE — 3008F BODY MASS INDEX DOCD: CPT | Mod: CPTII,S$GLB,, | Performed by: PHYSICIAN ASSISTANT

## 2018-05-07 PROCEDURE — 3079F DIAST BP 80-89 MM HG: CPT | Mod: CPTII,S$GLB,, | Performed by: PHYSICIAN ASSISTANT

## 2018-05-07 PROCEDURE — 3075F SYST BP GE 130 - 139MM HG: CPT | Mod: CPTII,S$GLB,, | Performed by: PHYSICIAN ASSISTANT

## 2018-05-07 PROCEDURE — 99214 OFFICE O/P EST MOD 30 MIN: CPT | Mod: S$GLB,,, | Performed by: PHYSICIAN ASSISTANT

## 2018-05-07 PROCEDURE — 99999 PR PBB SHADOW E&M-EST. PATIENT-LVL IV: CPT | Mod: PBBFAC,,, | Performed by: PHYSICIAN ASSISTANT

## 2018-05-07 RX ORDER — ESCITALOPRAM OXALATE 20 MG/1
20 TABLET ORAL DAILY
Qty: 90 TABLET | Refills: 1 | Status: SHIPPED | OUTPATIENT
Start: 2018-05-07 | End: 2018-10-29 | Stop reason: SDUPTHER

## 2018-05-07 RX ORDER — CICLOPIROX 80 MG/ML
SOLUTION TOPICAL NIGHTLY
Qty: 6.6 ML | Refills: 11 | Status: SHIPPED | OUTPATIENT
Start: 2018-05-07 | End: 2019-05-20 | Stop reason: ALTCHOICE

## 2018-05-07 NOTE — PROGRESS NOTES
Subjective:       Patient ID: Lavinia Herrera is a 43 y.o. female.    Chief Complaint: Follow-up    HPI   Patient is a 43 year old  female presenting to the clinic for 6 month follow-up.  1) Impaired fasting glucose- last check 4/23/18 was 108 & A1C 5.9. She reports diet is difficult to maintain due to  and sons. She is also not exercising. She recently started a new, full-time job which was increased her stress level.  2) Anxiety/depression- recently worsening 2/2 family stressors and friend recently being diagnosed with lymphoma. She would like the option of increasing her lexapro. She is not really taking her BuSpar , but likes to know she has it in case needed.  3) Cervical cancer screening- she is due for pap smear; we will refer to Flower Trinidad NP as she was previously seeing Dr. Carrillo.  Review of Systems   Constitutional: Positive for activity change. Negative for unexpected weight change.   HENT: Negative for hearing loss, rhinorrhea and trouble swallowing.    Eyes: Negative for discharge and visual disturbance.   Respiratory: Negative for chest tightness and wheezing.    Cardiovascular: Negative for chest pain and palpitations.   Gastrointestinal: Negative for blood in stool, constipation, diarrhea and vomiting.   Endocrine: Negative for polydipsia and polyuria.   Genitourinary: Negative for difficulty urinating, dysuria, hematuria and menstrual problem.   Musculoskeletal: Positive for arthralgias. Negative for joint swelling and neck pain.   Neurological: Negative for weakness and headaches.   Psychiatric/Behavioral: Negative for confusion and dysphoric mood. The patient is nervous/anxious.        Objective:      Physical Exam   Constitutional: Vital signs are normal. She appears well-developed and well-nourished. No distress.   Cardiovascular: Normal rate, regular rhythm, S1 normal, S2 normal and normal heart sounds.  Exam reveals no gallop.    No murmur heard.  Pulses:        Radial pulses are 2+ on the right side, and 2+ on the left side.   <   Pulmonary/Chest: Effort normal and breath sounds normal. No respiratory distress. She has no wheezes. She has no rhonchi.   Skin: Skin is warm and dry. She is not diaphoretic.   Psychiatric: She has a normal mood and affect. Her speech is normal and behavior is normal. Judgment and thought content normal. Cognition and memory are normal.       Assessment:       1. Anxiety and depression    2. Onychomycosis    3. Morbid obesity with BMI of 60.0-69.9, adult    4. Screening for cervical cancer        Plan:       Lavinia was seen today for follow-up.    Diagnoses and all orders for this visit:    Anxiety and depression  -     TSH; Future  -     escitalopram oxalate (LEXAPRO) 20 MG tablet; Take 1 tablet (20 mg total) by mouth once daily.    Onychomycosis  -     ciclopirox (PENLAC) 8 % Soln; Apply topically nightly.    Morbid obesity with BMI of 60.0-69.9, adult  We discussed options of bariatric medicine, ideal protein, endocrinology  -     Lipid panel; Future  -     Comprehensive metabolic panel; Future  -     CBC auto differential; Future  -     TSH; Future    Screening for cervical cancer  -     Ambulatory referral to Obstetrics / Gynecology    Patient readiness: acceptance and barriers:none    During the course of the visit the patient was educated and counseled about the following:     Obesity:   Regular aerobic exercise program discussed.    Goals: Obesity: Reduce calorie intake and BMI    Did patient meet goals/outcomes: No    The following self management tools provided: declined    Patient Instructions (the written plan) was given to the patient/family.     Time spent with patient: 30 minutes    Barriers to medications present (no )    Adverse reactions to current medications (no)    Over the counter medications reviewed (Yes)

## 2018-05-08 ENCOUNTER — PATIENT MESSAGE (OUTPATIENT)
Dept: FAMILY MEDICINE | Facility: CLINIC | Age: 43
End: 2018-05-08

## 2018-05-30 ENCOUNTER — OFFICE VISIT (OUTPATIENT)
Dept: FAMILY MEDICINE | Facility: CLINIC | Age: 43
End: 2018-05-30
Payer: COMMERCIAL

## 2018-05-30 VITALS
DIASTOLIC BLOOD PRESSURE: 78 MMHG | HEART RATE: 58 BPM | HEIGHT: 67 IN | BODY MASS INDEX: 45.99 KG/M2 | WEIGHT: 293 LBS | SYSTOLIC BLOOD PRESSURE: 139 MMHG

## 2018-05-30 DIAGNOSIS — Z01.419 ENCOUNTER FOR WELL WOMAN EXAM: Primary | ICD-10-CM

## 2018-05-30 PROCEDURE — 99999 PR PBB SHADOW E&M-EST. PATIENT-LVL III: CPT | Mod: PBBFAC,,, | Performed by: NURSE PRACTITIONER

## 2018-05-30 PROCEDURE — 88175 CYTOPATH C/V AUTO FLUID REDO: CPT

## 2018-05-30 PROCEDURE — 99396 PREV VISIT EST AGE 40-64: CPT | Mod: S$GLB,,, | Performed by: NURSE PRACTITIONER

## 2018-05-30 PROCEDURE — 3075F SYST BP GE 130 - 139MM HG: CPT | Mod: CPTII,S$GLB,, | Performed by: NURSE PRACTITIONER

## 2018-05-30 PROCEDURE — 3078F DIAST BP <80 MM HG: CPT | Mod: CPTII,S$GLB,, | Performed by: NURSE PRACTITIONER

## 2018-05-30 NOTE — PROGRESS NOTES
Chief Complaint   Patient presents with    Well Woman       History of Present Illness: Lavinia Herrera is a 43 y.o. female that presents today 2018 for well gyn visit.  Pt presents today for well woman exam. She reports that it has been 3 years since her last exam. She denies any abnormal pap smears in the past. She no longer has cycles because she has a mirena IUD. IUD was placed in . Pt denies any problem with the mirena. She does not desire any STD testing. Pt had mammogram in 2017. No other complaints or concerns noted.        Past Medical History:   Diagnosis Date    Allergy     Morbid obesity     Otitis media     Seasonal allergies        Past Surgical History:   Procedure Laterality Date    MANDIBLE OSTEOTOMY      TYMPANOSTOMY TUBE PLACEMENT  14       Family History   Problem Relation Age of Onset    Hypertension Mother     Asthma Mother     Arthritis Mother     Diabetes Maternal Grandmother     Heart disease Maternal Grandmother         CHF    Breast cancer Neg Hx     Colon cancer Neg Hx     Ovarian cancer Neg Hx     Cancer Neg Hx     Allergic rhinitis Neg Hx     Allergies Neg Hx     Angioedema Neg Hx     Atopy Neg Hx     Eczema Neg Hx     Immunodeficiency Neg Hx     Rhinitis Neg Hx     Urticaria Neg Hx        Social History     Social History    Marital status:      Spouse name: N/A    Number of children: N/A    Years of education: N/A     Social History Main Topics    Smoking status: Former Smoker     Quit date: 4/3/1998    Smokeless tobacco: Never Used    Alcohol use Yes      Comment: occasional    Drug use: No    Sexual activity: Yes     Partners: Male     Birth control/ protection: None     Other Topics Concern    None     Social History Narrative    None       OB History    Para Term  AB Living   3 3 3     3   SAB TAB Ectopic Multiple Live Births                  # Outcome Date GA Lbr Lex/2nd Weight Sex Delivery Anes PTL  "Lv   3 Term            2 Term            1 Term                   Current Outpatient Prescriptions   Medication Sig Dispense Refill    ciclopirox (PENLAC) 8 % Soln Apply topically nightly. 6.6 mL 11    DOCUSATE SODIUM (COLACE ORAL) Take 1 tablet by mouth once daily.      escitalopram oxalate (LEXAPRO) 20 MG tablet Take 1 tablet (20 mg total) by mouth once daily. 90 tablet 1    levonorgestrel (MIRENA) 20 mcg/24 hr (5 years) IUD 1 each by Intrauterine route once.      pantoprazole (PROTONIX) 40 MG tablet Take 1 tablet (40 mg total) by mouth once daily. 90 tablet 3    busPIRone (BUSPAR) 5 MG Tab Take 1 tablet (5 mg total) by mouth 2 (two) times daily as needed (anxiety). 60 tablet 2    meloxicam (MOBIC) 15 MG tablet TAKE 1 TABLET(15 MG) BY MOUTH EVERY DAY 30 tablet 0    metformin (GLUCOPHAGE-XR) 500 MG 24 hr tablet TAKE 1 TABLET(500 MG) BY MOUTH DAILY WITH BREAKFAST 90 tablet 3     No current facility-administered medications for this visit.        Review of patient's allergies indicates:  No Known Allergies    Review of Symptoms:  GENERAL: Denies weight gain or weight loss. Feeling well overall.   SKIN: Denies rash or lesions.   HEAD: Denies head injury or headache.   ABDOMEN: No abdominal pain, constipation, diarrhea, nausea, vomiting or rectal bleeding.   URINARY: No frequency, dysuria, hematuria, or burning on urination.    /78   Pulse (!) 58   Ht 5' 7" (1.702 m)   Wt (!) 178.1 kg (392 lb 10.2 oz)   LMP 05/23/2018     Physical Exam:  APPEARANCE: Well nourished, well developed, in no acute distress.  SKIN: Normal skin turgor, no lesions.  RESPIRATORY: Normal respiratory effort with no retractions or use of accessory muscles  ABDOMEN: Soft. No tenderness or masses. No hepatosplenomegaly. No hernias.  BREASTS: Symmetrical, no skin changes or visible lesions. No palpable masses, nipple discharge or adenopathy bilaterally.  PELVIC: Normal external female genitalia without lesions. Normal hair " distribution. Adequate perineal body, normal urethral meatus. Urethra with no masses.  Bladder nontender. Vagina moist and well rugated without lesions or discharge. Cervix pink and without lesions; IUD strings visible and palpable. No significant cystocele or rectocele. Bimanual exam showed uterus normal size, shape, position, mobile and nontender. Adnexa without masses or tenderness. Urethra and bladder normal. PAP DONE    ASSESSMENT/PLAN:  Encounter for well woman exam  -     Liquid-based pap smear, screening          Patient was counseled today on Pap guidelines, recommendation for pelvic exams, mammograms starting annually at age 40, Colonoscopy after the age of 50, Dexa Bone Scan and calcium and vitamin D supplementation in menopause and to see her PCP for other health maintenance.       Follow-up:  RTC in 1 year for well woman exam or as needed

## 2018-05-30 NOTE — LETTER
May 30, 2018      NATE Haines  [address]  [facility] [address]           [facility]  [address]  [facility] [address]  Phone: [phone]  Fax: [phone]          Patient: Lavinia Herrera   MR Number: [id number]   YOB: 1975   Date of Visit: 5/30/2018       Dear Val Naranjo:    Thank you for referring Lavinia Herrera to me for evaluation. Attached you will find relevant portions of my assessment and plan of care.    If you have questions, please do not hesitate to call me. I look forward to following Lavinia Herrera along with you.    Sincerely,    Flower Trinidad, NP    Enclosure  CC:  No Recipients    If you would like to receive this communication electronically, please contact [email or web address] or [phone] to request more information on [employer] Link access.    For providers and/or their staff who would like to refer a patient to [facility], please contact us through our one-stop-shop provider referral line, [facility], at [phone].    If you feel you have received this communication in error or would no longer like to receive these types of communications, please e-mail [email or web address]

## 2018-05-31 ENCOUNTER — LAB VISIT (OUTPATIENT)
Dept: LAB | Facility: HOSPITAL | Age: 43
End: 2018-05-31
Attending: PHYSICIAN ASSISTANT
Payer: COMMERCIAL

## 2018-05-31 DIAGNOSIS — F41.9 ANXIETY AND DEPRESSION: ICD-10-CM

## 2018-05-31 DIAGNOSIS — E66.01 MORBID OBESITY WITH BMI OF 60.0-69.9, ADULT: ICD-10-CM

## 2018-05-31 DIAGNOSIS — F32.A ANXIETY AND DEPRESSION: ICD-10-CM

## 2018-05-31 LAB
ALBUMIN SERPL BCP-MCNC: 3.5 G/DL
ALP SERPL-CCNC: 60 U/L
ALT SERPL W/O P-5'-P-CCNC: 18 U/L
ANION GAP SERPL CALC-SCNC: 8 MMOL/L
AST SERPL-CCNC: 15 U/L
BASOPHILS # BLD AUTO: 0.03 K/UL
BASOPHILS NFR BLD: 0.4 %
BILIRUB SERPL-MCNC: 0.5 MG/DL
BUN SERPL-MCNC: 13 MG/DL
CALCIUM SERPL-MCNC: 9.6 MG/DL
CHLORIDE SERPL-SCNC: 103 MMOL/L
CHOLEST SERPL-MCNC: 204 MG/DL
CHOLEST/HDLC SERPL: 3.6 {RATIO}
CO2 SERPL-SCNC: 30 MMOL/L
CREAT SERPL-MCNC: 0.9 MG/DL
DIFFERENTIAL METHOD: ABNORMAL
EOSINOPHIL # BLD AUTO: 0.2 K/UL
EOSINOPHIL NFR BLD: 2.4 %
ERYTHROCYTE [DISTWIDTH] IN BLOOD BY AUTOMATED COUNT: 12.6 %
EST. GFR  (AFRICAN AMERICAN): >60 ML/MIN/1.73 M^2
EST. GFR  (NON AFRICAN AMERICAN): >60 ML/MIN/1.73 M^2
GLUCOSE SERPL-MCNC: 130 MG/DL
HCT VFR BLD AUTO: 40.9 %
HDLC SERPL-MCNC: 56 MG/DL
HDLC SERPL: 27.5 %
HGB BLD-MCNC: 12.5 G/DL
IMM GRANULOCYTES # BLD AUTO: 0.03 K/UL
IMM GRANULOCYTES NFR BLD AUTO: 0.4 %
LDLC SERPL CALC-MCNC: 135.4 MG/DL
LYMPHOCYTES # BLD AUTO: 1.6 K/UL
LYMPHOCYTES NFR BLD: 20.1 %
MCH RBC QN AUTO: 29 PG
MCHC RBC AUTO-ENTMCNC: 30.6 G/DL
MCV RBC AUTO: 95 FL
MONOCYTES # BLD AUTO: 0.7 K/UL
MONOCYTES NFR BLD: 8.6 %
NEUTROPHILS # BLD AUTO: 5.4 K/UL
NEUTROPHILS NFR BLD: 68.1 %
NONHDLC SERPL-MCNC: 148 MG/DL
NRBC BLD-RTO: 0 /100 WBC
PLATELET # BLD AUTO: 263 K/UL
PMV BLD AUTO: 11 FL
POTASSIUM SERPL-SCNC: 4.6 MMOL/L
PROT SERPL-MCNC: 7.5 G/DL
RBC # BLD AUTO: 4.31 M/UL
SODIUM SERPL-SCNC: 141 MMOL/L
T4 FREE SERPL-MCNC: 0.84 NG/DL
TRIGL SERPL-MCNC: 63 MG/DL
TSH SERPL DL<=0.005 MIU/L-ACNC: 4.61 UIU/ML
WBC # BLD AUTO: 7.87 K/UL

## 2018-05-31 PROCEDURE — 80053 COMPREHEN METABOLIC PANEL: CPT

## 2018-05-31 PROCEDURE — 84439 ASSAY OF FREE THYROXINE: CPT

## 2018-05-31 PROCEDURE — 85025 COMPLETE CBC W/AUTO DIFF WBC: CPT

## 2018-05-31 PROCEDURE — 84443 ASSAY THYROID STIM HORMONE: CPT

## 2018-05-31 PROCEDURE — 36415 COLL VENOUS BLD VENIPUNCTURE: CPT | Mod: PO

## 2018-05-31 PROCEDURE — 80061 LIPID PANEL: CPT

## 2018-06-01 ENCOUNTER — TELEPHONE (OUTPATIENT)
Dept: FAMILY MEDICINE | Facility: CLINIC | Age: 43
End: 2018-06-01

## 2018-06-01 ENCOUNTER — TELEPHONE (OUTPATIENT)
Dept: ORTHOPEDICS | Facility: CLINIC | Age: 43
End: 2018-06-01

## 2018-06-01 DIAGNOSIS — R79.89 ELEVATED TSH: Primary | ICD-10-CM

## 2018-06-01 DIAGNOSIS — R73.09 ELEVATED GLUCOSE: ICD-10-CM

## 2018-06-01 NOTE — TELEPHONE ENCOUNTER
----- Message from NATE Haines sent at 6/1/2018  6:52 AM CDT -----  Blood counts normal. Cholesterol fairly well controlled.   Electrolytes look okay with the exception of elevated glucose level. I would like for her to come in for an A1C.  She will also need to repeat her thyroid lab as it shows slightly underactive thyroid.    A1C, TSH in 4 weeks.

## 2018-06-01 NOTE — TELEPHONE ENCOUNTER
----- Message from Pebbles Diamond sent at 6/1/2018  2:13 PM CDT -----  Contact: PT  Type: Needs Medical Advice    Who Called:  Lavinia Herrera  Symptoms (please be specific):  LT shoulder pain, asking for injection   How long has patient had these symptoms:  n/a  Pharmacy name and phone #:  n/a  Best Call Back Number: [phone]  Additional Information: Pt calling to see if she can come in today to get an injection in her shoulder. Please advise.

## 2018-06-07 DIAGNOSIS — M25.512 LEFT SHOULDER PAIN, UNSPECIFIED CHRONICITY: Primary | ICD-10-CM

## 2018-06-30 DIAGNOSIS — F32.A ANXIETY AND DEPRESSION: ICD-10-CM

## 2018-06-30 DIAGNOSIS — F41.9 ANXIETY AND DEPRESSION: ICD-10-CM

## 2018-06-30 DIAGNOSIS — K21.9 GASTROESOPHAGEAL REFLUX DISEASE, ESOPHAGITIS PRESENCE NOT SPECIFIED: ICD-10-CM

## 2018-07-02 ENCOUNTER — LAB VISIT (OUTPATIENT)
Dept: LAB | Facility: HOSPITAL | Age: 43
End: 2018-07-02
Attending: PHYSICIAN ASSISTANT
Payer: COMMERCIAL

## 2018-07-02 DIAGNOSIS — R79.89 ELEVATED TSH: ICD-10-CM

## 2018-07-02 DIAGNOSIS — R73.09 ELEVATED GLUCOSE: ICD-10-CM

## 2018-07-02 LAB
ESTIMATED AVG GLUCOSE: 134 MG/DL
HBA1C MFR BLD HPLC: 6.3 %
T4 FREE SERPL-MCNC: 0.84 NG/DL
TSH SERPL DL<=0.005 MIU/L-ACNC: 5.47 UIU/ML

## 2018-07-02 PROCEDURE — 84443 ASSAY THYROID STIM HORMONE: CPT

## 2018-07-02 PROCEDURE — 84439 ASSAY OF FREE THYROXINE: CPT

## 2018-07-02 PROCEDURE — 36415 COLL VENOUS BLD VENIPUNCTURE: CPT | Mod: PO

## 2018-07-02 PROCEDURE — 83036 HEMOGLOBIN GLYCOSYLATED A1C: CPT

## 2018-07-02 RX ORDER — ESCITALOPRAM OXALATE 10 MG/1
TABLET ORAL
Qty: 90 TABLET | Refills: 3 | OUTPATIENT
Start: 2018-07-02

## 2018-07-02 RX ORDER — PANTOPRAZOLE SODIUM 40 MG/1
TABLET, DELAYED RELEASE ORAL
Qty: 90 TABLET | Refills: 3 | Status: SHIPPED | OUTPATIENT
Start: 2018-07-02 | End: 2019-06-25 | Stop reason: SDUPTHER

## 2018-07-03 RX ORDER — MELOXICAM 15 MG/1
TABLET ORAL
Qty: 30 TABLET | Refills: 3 | Status: SHIPPED | OUTPATIENT
Start: 2018-07-03 | End: 2018-10-30 | Stop reason: SDUPTHER

## 2018-07-05 DIAGNOSIS — R73.03 PRE-DIABETES: ICD-10-CM

## 2018-07-05 DIAGNOSIS — E03.9 HYPOTHYROIDISM, UNSPECIFIED TYPE: Primary | ICD-10-CM

## 2018-07-05 RX ORDER — METFORMIN HYDROCHLORIDE 500 MG/1
500 TABLET, EXTENDED RELEASE ORAL NIGHTLY
Qty: 90 TABLET | Refills: 1 | Status: SHIPPED | OUTPATIENT
Start: 2018-07-05 | End: 2018-12-29 | Stop reason: SDUPTHER

## 2018-07-05 RX ORDER — LEVOTHYROXINE SODIUM 50 UG/1
50 TABLET ORAL DAILY
Qty: 30 TABLET | Refills: 5 | Status: SHIPPED | OUTPATIENT
Start: 2018-07-05 | End: 2018-12-29 | Stop reason: SDUPTHER

## 2018-07-19 DIAGNOSIS — M25.569 KNEE PAIN, UNSPECIFIED CHRONICITY, UNSPECIFIED LATERALITY: Primary | ICD-10-CM

## 2018-07-23 ENCOUNTER — HOSPITAL ENCOUNTER (OUTPATIENT)
Dept: RADIOLOGY | Facility: HOSPITAL | Age: 43
Discharge: HOME | End: 2018-07-23
Attending: ORTHOPAEDIC SURGERY
Payer: COMMERCIAL

## 2018-07-23 ENCOUNTER — OFFICE VISIT (OUTPATIENT)
Dept: ORTHOPEDICS | Facility: CLINIC | Age: 43
End: 2018-07-23
Payer: COMMERCIAL

## 2018-07-23 VITALS
BODY MASS INDEX: 45.99 KG/M2 | HEART RATE: 58 BPM | SYSTOLIC BLOOD PRESSURE: 133 MMHG | HEIGHT: 67 IN | DIASTOLIC BLOOD PRESSURE: 70 MMHG | WEIGHT: 293 LBS

## 2018-07-23 DIAGNOSIS — M25.569 KNEE PAIN, UNSPECIFIED CHRONICITY, UNSPECIFIED LATERALITY: ICD-10-CM

## 2018-07-23 DIAGNOSIS — M25.562 ACUTE PAIN OF LEFT KNEE: Primary | ICD-10-CM

## 2018-07-23 DIAGNOSIS — M25.562 LEFT KNEE PAIN, UNSPECIFIED CHRONICITY: Primary | ICD-10-CM

## 2018-07-23 PROCEDURE — 99214 OFFICE O/P EST MOD 30 MIN: CPT | Mod: S$GLB,,, | Performed by: ORTHOPAEDIC SURGERY

## 2018-07-23 PROCEDURE — 3078F DIAST BP <80 MM HG: CPT | Mod: CPTII,S$GLB,, | Performed by: ORTHOPAEDIC SURGERY

## 2018-07-23 PROCEDURE — 3008F BODY MASS INDEX DOCD: CPT | Mod: CPTII,S$GLB,, | Performed by: ORTHOPAEDIC SURGERY

## 2018-07-23 PROCEDURE — 3075F SYST BP GE 130 - 139MM HG: CPT | Mod: CPTII,S$GLB,, | Performed by: ORTHOPAEDIC SURGERY

## 2018-07-23 PROCEDURE — 73564 X-RAY EXAM KNEE 4 OR MORE: CPT | Mod: 26,50,, | Performed by: RADIOLOGY

## 2018-07-23 PROCEDURE — 73564 X-RAY EXAM KNEE 4 OR MORE: CPT | Mod: TC,50,PN

## 2018-07-23 PROCEDURE — 99999 PR PBB SHADOW E&M-EST. PATIENT-LVL III: CPT | Mod: PBBFAC,,, | Performed by: ORTHOPAEDIC SURGERY

## 2018-07-23 RX ORDER — GABAPENTIN 300 MG/1
CAPSULE ORAL
Refills: 0 | COMMUNITY
Start: 2018-07-15 | End: 2019-01-09

## 2018-07-24 NOTE — PROGRESS NOTES
Past Medical History:   Diagnosis Date    Allergy     Morbid obesity     Otitis media     Seasonal allergies        Past Surgical History:   Procedure Laterality Date    MANDIBLE OSTEOTOMY      TYMPANOSTOMY TUBE PLACEMENT  6/30/14       Current Outpatient Prescriptions   Medication Sig    busPIRone (BUSPAR) 5 MG Tab Take 1 tablet (5 mg total) by mouth 2 (two) times daily as needed (anxiety).    ciclopirox (PENLAC) 8 % Soln Apply topically nightly.    DOCUSATE SODIUM (COLACE ORAL) Take 1 tablet by mouth once daily.    escitalopram oxalate (LEXAPRO) 20 MG tablet Take 1 tablet (20 mg total) by mouth once daily.    gabapentin (NEURONTIN) 300 MG capsule TK 1 C PO TID PRN    levonorgestrel (MIRENA) 20 mcg/24 hr (5 years) IUD 1 each by Intrauterine route once.    levothyroxine (SYNTHROID) 50 MCG tablet Take 1 tablet (50 mcg total) by mouth once daily.    meloxicam (MOBIC) 15 MG tablet TAKE 1 TABLET(15 MG) BY MOUTH EVERY DAY    metFORMIN (GLUCOPHAGE-XR) 500 MG 24 hr tablet Take 1 tablet (500 mg total) by mouth every evening.    pantoprazole (PROTONIX) 40 MG tablet TAKE 1 TABLET(40 MG) BY MOUTH EVERY DAY     No current facility-administered medications for this visit.        Review of patient's allergies indicates:  No Known Allergies    Family History   Problem Relation Age of Onset    Hypertension Mother     Asthma Mother     Arthritis Mother     Diabetes Maternal Grandmother     Heart disease Maternal Grandmother         CHF    Breast cancer Neg Hx     Colon cancer Neg Hx     Ovarian cancer Neg Hx     Cancer Neg Hx     Allergic rhinitis Neg Hx     Allergies Neg Hx     Angioedema Neg Hx     Atopy Neg Hx     Eczema Neg Hx     Immunodeficiency Neg Hx     Rhinitis Neg Hx     Urticaria Neg Hx        Social History     Social History    Marital status:      Spouse name: N/A    Number of children: N/A    Years of education: N/A     Occupational History    Not on file.     Social  "History Main Topics    Smoking status: Former Smoker     Quit date: 4/3/1998    Smokeless tobacco: Never Used    Alcohol use Yes      Comment: occasional    Drug use: No    Sexual activity: Yes     Partners: Male     Birth control/ protection: None     Other Topics Concern    Not on file     Social History Narrative    No narrative on file       Chief Complaint:   Chief Complaint   Patient presents with    Left Knee - Pain       Consulting Physician: No ref. provider found    History of present illness:    This is a 43 y.o. female who complains of left knee pain since a fall down stairs 7-14-18. Seen in urgent care. Left knee trapped underneath when right slipped. Pain 3/10 and worse with use.    Review of Systems:    Constitution: Denies chills, fever, and sweats.  HENT: Denies headaches or blurry vision.  Cardiovascular: Denies chest pain or irregular heart beat.  Respiratory: Denies cough or shortness of breath.  Gastrointestinal: Denies abdominal pain, nausea, or vomiting.  Musculoskeletal:  Denies muscle cramps.  Neurological: Denies dizziness or focal weakness.  Psychiatric/Behavioral: Normal mental status.  Hematologic/Lymphatic: Denies bleeding problem or easy bruising/bleeding.  Skin: Denies rash or suspicious lesions.    Examination:    Vital Signs:    Vitals:    07/23/18 1537   BP: 133/70   Pulse: (!) 58   Weight: (!) 178.1 kg (392 lb 10.2 oz)   Height: 5' 7" (1.702 m)   PainSc:   3   PainLoc: Knee       Body mass index is 61.5 kg/m².    This a well-developed, well nourished patient in no acute distress.    Alert and oriented x 3 and cooperative to examination.       Physical Exam: Left Knee Exam    Gait   Normal    Skin  Rash:   None  Scars:   None    Inspection  Erythema:  None  Bruising:  None  Effusion:  mild  Masses:  None  Lymphadenopathy: None    Range of Motion: 0 to 130° with pain    Medial Joint : y  Lateral Joint : n    Patellofemoral " Tenderness: None  Patellofemoral Crepitus: None    Lachman:  Normal  Anterior Drawer: Normal  Posterior Drawer: Normal    Zahraa's:  +  Apley's:  +    Varus Stress:  Stable  Valgus Stress:  Stable    Strength:  5/5    Pulses:  Palpable distal    Sensation:  Intact          Imaging: X-rays ordered and reviewed today personally of the left knee show mild DJD.        Assessment: Acute pain of left knee        Plan:  She has catching and medial joint line tenderness. Pain on stairs. Fine before fall. Will MRI.      DISCLAIMER: This note may have been dictated using voice recognition software and may contain grammatical errors.     NOTE: Consult report sent to referring provider via [employer] E[employer].

## 2018-08-02 ENCOUNTER — HOSPITAL ENCOUNTER (OUTPATIENT)
Dept: RADIOLOGY | Facility: HOSPITAL | Age: 43
Discharge: HOME | End: 2018-08-02
Attending: ORTHOPAEDIC SURGERY
Payer: COMMERCIAL

## 2018-08-02 DIAGNOSIS — M25.562 LEFT KNEE PAIN, UNSPECIFIED CHRONICITY: ICD-10-CM

## 2018-08-02 PROCEDURE — 73721 MRI JNT OF LWR EXTRE W/O DYE: CPT | Mod: 26,LT,, | Performed by: RADIOLOGY

## 2018-08-02 PROCEDURE — 73721 MRI JNT OF LWR EXTRE W/O DYE: CPT | Mod: TC,PO,LT

## 2018-08-10 ENCOUNTER — OFFICE VISIT (OUTPATIENT)
Dept: ORTHOPEDICS | Facility: CLINIC | Age: 43
End: 2018-08-10
Payer: COMMERCIAL

## 2018-08-10 VITALS
WEIGHT: 293 LBS | DIASTOLIC BLOOD PRESSURE: 65 MMHG | HEART RATE: 52 BPM | HEIGHT: 67 IN | SYSTOLIC BLOOD PRESSURE: 137 MMHG | BODY MASS INDEX: 45.99 KG/M2

## 2018-08-10 DIAGNOSIS — M25.562 ACUTE PAIN OF LEFT KNEE: Primary | ICD-10-CM

## 2018-08-10 PROCEDURE — 3078F DIAST BP <80 MM HG: CPT | Mod: CPTII,S$GLB,, | Performed by: ORTHOPAEDIC SURGERY

## 2018-08-10 PROCEDURE — 99999 PR PBB SHADOW E&M-EST. PATIENT-LVL III: CPT | Mod: PBBFAC,,, | Performed by: ORTHOPAEDIC SURGERY

## 2018-08-10 PROCEDURE — 99214 OFFICE O/P EST MOD 30 MIN: CPT | Mod: S$GLB,,, | Performed by: ORTHOPAEDIC SURGERY

## 2018-08-10 PROCEDURE — 3075F SYST BP GE 130 - 139MM HG: CPT | Mod: CPTII,S$GLB,, | Performed by: ORTHOPAEDIC SURGERY

## 2018-08-10 PROCEDURE — 3008F BODY MASS INDEX DOCD: CPT | Mod: CPTII,S$GLB,, | Performed by: ORTHOPAEDIC SURGERY

## 2018-08-14 NOTE — PROGRESS NOTES
Past Medical History:   Diagnosis Date    Allergy     Morbid obesity     Otitis media     Seasonal allergies        Past Surgical History:   Procedure Laterality Date    MANDIBLE OSTEOTOMY      TYMPANOSTOMY TUBE PLACEMENT  6/30/14       Current Outpatient Medications   Medication Sig    ciclopirox (PENLAC) 8 % Soln Apply topically nightly.    DOCUSATE SODIUM (COLACE ORAL) Take 1 tablet by mouth once daily.    escitalopram oxalate (LEXAPRO) 20 MG tablet Take 1 tablet (20 mg total) by mouth once daily.    gabapentin (NEURONTIN) 300 MG capsule TK 1 C PO TID PRN    levonorgestrel (MIRENA) 20 mcg/24 hr (5 years) IUD 1 each by Intrauterine route once.    levothyroxine (SYNTHROID) 50 MCG tablet Take 1 tablet (50 mcg total) by mouth once daily.    meloxicam (MOBIC) 15 MG tablet TAKE 1 TABLET(15 MG) BY MOUTH EVERY DAY    metFORMIN (GLUCOPHAGE-XR) 500 MG 24 hr tablet Take 1 tablet (500 mg total) by mouth every evening.    pantoprazole (PROTONIX) 40 MG tablet TAKE 1 TABLET(40 MG) BY MOUTH EVERY DAY    busPIRone (BUSPAR) 5 MG Tab Take 1 tablet (5 mg total) by mouth 2 (two) times daily as needed (anxiety).     No current facility-administered medications for this visit.        Review of patient's allergies indicates:  No Known Allergies    Family History   Problem Relation Age of Onset    Hypertension Mother     Asthma Mother     Arthritis Mother     Diabetes Maternal Grandmother     Heart disease Maternal Grandmother         CHF    Breast cancer Neg Hx     Colon cancer Neg Hx     Ovarian cancer Neg Hx     Cancer Neg Hx     Allergic rhinitis Neg Hx     Allergies Neg Hx     Angioedema Neg Hx     Atopy Neg Hx     Eczema Neg Hx     Immunodeficiency Neg Hx     Rhinitis Neg Hx     Urticaria Neg Hx        Social History     Socioeconomic History    Marital status:      Spouse name: Not on file    Number of children: Not on file    Years of education: Not on file    Highest education level:  "Not on file   Social Needs    Financial resource strain: Not on file    Food insecurity - worry: Not on file    Food insecurity - inability: Not on file    Transportation needs - medical: Not on file    Transportation needs - non-medical: Not on file   Occupational History    Not on file   Tobacco Use    Smoking status: Former Smoker     Last attempt to quit: 4/3/1998     Years since quittin.3    Smokeless tobacco: Never Used   Substance and Sexual Activity    Alcohol use: Yes     Comment: occasional    Drug use: No    Sexual activity: Yes     Partners: Male     Birth control/protection: None   Other Topics Concern    Not on file   Social History Narrative    Not on file       Chief Complaint:   Chief Complaint   Patient presents with    Knee Pain     L knee mri results        Consulting Physician: No ref. provider found    History of present illness:    This is a 43 y.o. female who complains of left knee pain since a fall down stairs 18. Seen in urgent care. Left knee trapped underneath when right slipped. Pain 3/10 and worse with use.    Review of Systems:    Constitution: Denies chills, fever, and sweats.  HENT: Denies headaches or blurry vision.  Cardiovascular: Denies chest pain or irregular heart beat.  Respiratory: Denies cough or shortness of breath.  Gastrointestinal: Denies abdominal pain, nausea, or vomiting.  Musculoskeletal:  Denies muscle cramps.  Neurological: Denies dizziness or focal weakness.  Psychiatric/Behavioral: Normal mental status.  Hematologic/Lymphatic: Denies bleeding problem or easy bruising/bleeding.  Skin: Denies rash or suspicious lesions.    Examination:    Vital Signs:    Vitals:    08/10/18 0916   BP: 137/65   Pulse: (!) 52   Weight: (!) 177.8 kg (392 lb)   Height: 5' 7" (1.702 m)   PainSc:   3   PainLoc: Knee       Body mass index is 61.4 kg/m².    This a well-developed, well nourished patient in no acute distress.    Alert and oriented x 3 and cooperative " to examination.       Physical Exam: Left Knee Exam    Gait   Normal    Skin  Rash:   None  Scars:   None    Inspection  Erythema:  None  Bruising:  None  Effusion:  mild  Masses:  None  Lymphadenopathy: None    Range of Motion: 0 to 130° with pain    Medial Joint : y  Lateral Joint : n    Patellofemoral Tenderness: None  Patellofemoral Crepitus: None    Lachman:  Normal  Anterior Drawer: Normal  Posterior Drawer: Normal    Zahraa's:  +  Apley's:  +    Varus Stress:  Stable  Valgus Stress:  Stable    Strength:  5/5    Pulses:  Palpable distal    Sensation:  Intact          Imaging: X-rays of the left knee show mild DJD.  MRI reviewed with the patient today shows a grade 1 medial collateral ligament strain.       Assessment: Acute pain of left knee        Plan:  We discussed her MRI results and treatment options today including surgical options. She elected proceed with a home exercise program for MCL strengthening.  We gave her a prescription for Voltaren.  We will see her back as needed.    DISCLAIMER: This note may have been dictated using voice recognition software and may contain grammatical errors.     NOTE: Consult report sent to referring provider via [employer].

## 2018-10-05 ENCOUNTER — LAB VISIT (OUTPATIENT)
Dept: LAB | Facility: HOSPITAL | Age: 43
End: 2018-10-05
Attending: PHYSICIAN ASSISTANT
Payer: COMMERCIAL

## 2018-10-05 DIAGNOSIS — E03.9 HYPOTHYROIDISM, UNSPECIFIED TYPE: ICD-10-CM

## 2018-10-05 DIAGNOSIS — R73.03 PRE-DIABETES: ICD-10-CM

## 2018-10-05 LAB
ESTIMATED AVG GLUCOSE: 117 MG/DL
HBA1C MFR BLD HPLC: 5.7 %
TSH SERPL DL<=0.005 MIU/L-ACNC: 2.99 UIU/ML

## 2018-10-05 PROCEDURE — 36415 COLL VENOUS BLD VENIPUNCTURE: CPT | Mod: PO

## 2018-10-05 PROCEDURE — 83036 HEMOGLOBIN GLYCOSYLATED A1C: CPT

## 2018-10-05 PROCEDURE — 84443 ASSAY THYROID STIM HORMONE: CPT

## 2018-10-10 ENCOUNTER — TELEPHONE (OUTPATIENT)
Dept: FAMILY MEDICINE | Facility: CLINIC | Age: 43
End: 2018-10-10

## 2018-10-10 ENCOUNTER — OFFICE VISIT (OUTPATIENT)
Dept: FAMILY MEDICINE | Facility: CLINIC | Age: 43
End: 2018-10-10
Payer: COMMERCIAL

## 2018-10-10 VITALS
RESPIRATION RATE: 16 BRPM | TEMPERATURE: 98 F | SYSTOLIC BLOOD PRESSURE: 130 MMHG | HEART RATE: 50 BPM | WEIGHT: 293 LBS | BODY MASS INDEX: 45.99 KG/M2 | OXYGEN SATURATION: 95 % | DIASTOLIC BLOOD PRESSURE: 78 MMHG | HEIGHT: 67 IN

## 2018-10-10 DIAGNOSIS — E03.9 HYPOTHYROIDISM, UNSPECIFIED TYPE: Primary | ICD-10-CM

## 2018-10-10 DIAGNOSIS — L30.9 DERMATITIS: ICD-10-CM

## 2018-10-10 DIAGNOSIS — E66.01 MORBID OBESITY WITH BMI OF 60.0-69.9, ADULT: ICD-10-CM

## 2018-10-10 DIAGNOSIS — G47.9 SLEEP DISTURBANCE: ICD-10-CM

## 2018-10-10 DIAGNOSIS — R73.03 PREDIABETES: ICD-10-CM

## 2018-10-10 PROBLEM — K59.00 CONSTIPATION: Status: RESOLVED | Noted: 2017-11-20 | Resolved: 2018-10-10

## 2018-10-10 PROCEDURE — 99214 OFFICE O/P EST MOD 30 MIN: CPT | Mod: S$GLB,,, | Performed by: PHYSICIAN ASSISTANT

## 2018-10-10 PROCEDURE — 3008F BODY MASS INDEX DOCD: CPT | Mod: CPTII,S$GLB,, | Performed by: PHYSICIAN ASSISTANT

## 2018-10-10 PROCEDURE — 3078F DIAST BP <80 MM HG: CPT | Mod: CPTII,S$GLB,, | Performed by: PHYSICIAN ASSISTANT

## 2018-10-10 PROCEDURE — 99999 PR PBB SHADOW E&M-EST. PATIENT-LVL V: CPT | Mod: PBBFAC,,, | Performed by: PHYSICIAN ASSISTANT

## 2018-10-10 PROCEDURE — 3075F SYST BP GE 130 - 139MM HG: CPT | Mod: CPTII,S$GLB,, | Performed by: PHYSICIAN ASSISTANT

## 2018-10-10 RX ORDER — TRIAMCINOLONE ACETONIDE 0.25 MG/G
OINTMENT TOPICAL 2 TIMES DAILY
Qty: 30 G | Refills: 0 | Status: SHIPPED | OUTPATIENT
Start: 2018-10-10 | End: 2021-03-12

## 2018-10-10 NOTE — PROGRESS NOTES
Subjective:       Patient ID: Lavinia Herrera is a 43 y.o. female.    Chief Complaint: Follow-up (3 mth f/u); Fatigue; and Rash (back of the right ear )    Ms. Herrera comes to clinic today for for follow up. She recently had labs that revealed improvement in blood sugar and improvement in TSH. The patient reports that she had lost 35 pounds on the keto diet. The patient reports she recently had increased stress with a sick family member she was caring for so she was unable to follow her diet. The patient is interested in bariatric surgery; her insurance will not cover this. The patient also complains of day time fatigue. She does snore. The patient also complains of a rash behind her right ear. The patient reports this comes and goes. There are red, itchy bumps present.       Review of Systems   Constitutional: Positive for fatigue and unexpected weight change. Negative for activity change.   HENT: Negative for hearing loss, rhinorrhea and trouble swallowing.    Eyes: Negative for discharge and visual disturbance.   Respiratory: Negative for chest tightness and wheezing.    Cardiovascular: Negative for chest pain and palpitations.   Gastrointestinal: Negative for blood in stool, constipation, diarrhea and vomiting.   Endocrine: Negative for polydipsia and polyuria.   Genitourinary: Negative for difficulty urinating, dysuria, hematuria and menstrual problem.   Musculoskeletal: Negative for arthralgias, joint swelling and neck pain.   Skin: Positive for color change and rash.   Neurological: Negative for weakness and headaches.   Psychiatric/Behavioral: Negative for confusion and dysphoric mood.       Objective:      Physical Exam   Constitutional: She is oriented to person, place, and time.   HENT:   Mouth/Throat: Oropharynx is clear and moist. No oropharyngeal exudate.   Eyes: Conjunctivae are normal. Pupils are equal, round, and reactive to light.   Cardiovascular: Normal rate and regular rhythm.    Pulmonary/Chest: Effort normal and breath sounds normal. She has no wheezes.   Abdominal: Soft. Bowel sounds are normal. There is no tenderness.   Musculoskeletal: She exhibits no edema.   Lymphadenopathy:     She has no cervical adenopathy.   Neurological: She is alert and oriented to person, place, and time.   Skin: No erythema.        Psychiatric: Her behavior is normal.       Assessment:       1. Hypothyroidism, unspecified type    2. Prediabetes    3. Morbid obesity with BMI of 60.0-69.9, adult    4. Dermatitis    5. Sleep disturbance        Plan:   Lavinia was seen today for follow-up, fatigue and rash.    Diagnoses and all orders for this visit:    Hypothyroidism, unspecified type  Labs normal  Continue current medication dose  Prediabetes  Improvement in blood sugar control  Continue current medication  Morbid obesity with BMI of 60.0-69.9, adult  -     Ambulatory Consult to [facility]  Increase exercise  Dermatitis  -     triamcinolone acetonide 0.025% (KENALOG) 0.025 % Oint; Apply topically 2 (two) times daily. for 10 days    Sleep disturbance  -     Ambulatory consult to Sleep Disorders  Suspect sleep apnea.    Patient readiness: acceptance and barriers:none    During the course of the visit the patient was educated and counseled about the following:     Obesity:   General weight loss/lifestyle modification strategies discussed (elicit support from others; identify saboteurs; non-food rewards, etc).  Informal exercise measures discussed, e.g. taking stairs instead of elevator.    Goals: Obesity: Reduce calorie intake and BMI    Did patient meet goals/outcomes: No    The following self management tools provided: declined    Patient Instructions (the written plan) was given to the patient/family.     Time spent with patient: 30 minutes    Barriers to medications present (no )    Adverse reactions to current medications (no)    Over the counter medications reviewed (Yes)

## 2018-10-10 NOTE — TELEPHONE ENCOUNTER
Yes, she can call our office at [phone] or email us at [email or web address].     Thanks so much!    Lisa

## 2018-10-10 NOTE — TELEPHONE ENCOUNTER
Please advise patient that if she would like to start the [facility] program she should can call the office at [phone] or email the pffoce at [email or web address].

## 2018-10-10 NOTE — TELEPHONE ENCOUNTER
Patient referred to [facility] in [employer]. Does your office contact the patient to set up consultation?

## 2018-10-11 ENCOUNTER — PATIENT MESSAGE (OUTPATIENT)
Dept: BARIATRICS | Facility: CLINIC | Age: 43
End: 2018-10-11

## 2018-10-29 DIAGNOSIS — F41.9 ANXIETY AND DEPRESSION: ICD-10-CM

## 2018-10-29 DIAGNOSIS — F32.A ANXIETY AND DEPRESSION: ICD-10-CM

## 2018-10-29 RX ORDER — ESCITALOPRAM OXALATE 20 MG/1
TABLET ORAL
Qty: 90 TABLET | Refills: 1 | Status: SHIPPED | OUTPATIENT
Start: 2018-10-29 | End: 2019-05-01 | Stop reason: SDUPTHER

## 2018-10-30 RX ORDER — MELOXICAM 15 MG/1
15 TABLET ORAL DAILY PRN
Qty: 30 TABLET | Refills: 11 | Status: SHIPPED | OUTPATIENT
Start: 2018-10-30 | End: 2021-03-12

## 2018-12-19 ENCOUNTER — PATIENT OUTREACH (OUTPATIENT)
Dept: ADMINISTRATIVE | Facility: HOSPITAL | Age: 43
End: 2018-12-19

## 2018-12-19 DIAGNOSIS — Z12.39 SCREENING FOR BREAST CANCER: Primary | ICD-10-CM

## 2018-12-19 NOTE — LETTER
January 2, 2019    [address] [place] [id number]             [facility]  [address]  [facility] [address]  Phone: [phone] Dear [facility] is committed to your overall health and would like to ensure that you are up to date on your recommended test and/or procedures.   Cara Murguia MD  has found that your chart shows you may be due for the following:     MAMMOGRAM     If you have had any of the above done at another facility, please let us know so that we may obtain copies from that facility.  If you have a copy of these records, please provide a copy for us to scan into your chart.  You are welcome to request that the report be faxed to us at  ([phone]).     Otherwise, please schedule these appointments at your earliest convenience by calling [phone] or going to [email or web address].         Sincerely,   Your [facility]   MD Erika Quinn LPN Clinical Care Coordinator   [facility] [address] [place] [address]   Phone [phone]   Fax [phone]

## 2018-12-29 DIAGNOSIS — E03.9 HYPOTHYROIDISM, UNSPECIFIED TYPE: ICD-10-CM

## 2018-12-31 RX ORDER — METFORMIN HYDROCHLORIDE 500 MG/1
TABLET, EXTENDED RELEASE ORAL
Qty: 90 TABLET | Refills: 0 | Status: SHIPPED | OUTPATIENT
Start: 2018-12-31 | End: 2019-01-09 | Stop reason: SDUPTHER

## 2018-12-31 RX ORDER — LEVOTHYROXINE SODIUM 50 UG/1
TABLET ORAL
Qty: 30 TABLET | Refills: 0 | Status: SHIPPED | OUTPATIENT
Start: 2018-12-31 | End: 2019-01-09 | Stop reason: SDUPTHER

## 2019-01-02 NOTE — PROGRESS NOTES
"Attempted to outreach patient for pre-visit via "[employer]", no answer after a week. Sending outreach via Mail Out Letter now.    "

## 2019-01-07 ENCOUNTER — TELEPHONE (OUTPATIENT)
Dept: FAMILY MEDICINE | Facility: CLINIC | Age: 44
End: 2019-01-07

## 2019-01-07 NOTE — TELEPHONE ENCOUNTER
Called pt. Need to reschedule appt with Dr. Murguia today. Dr. Murguia called out sick.  Rescheduled to 1/9/19. Thanks, Brenna

## 2019-01-09 ENCOUNTER — OFFICE VISIT (OUTPATIENT)
Dept: FAMILY MEDICINE | Facility: CLINIC | Age: 44
End: 2019-01-09
Payer: COMMERCIAL

## 2019-01-09 VITALS
TEMPERATURE: 98 F | WEIGHT: 293 LBS | DIASTOLIC BLOOD PRESSURE: 85 MMHG | SYSTOLIC BLOOD PRESSURE: 144 MMHG | HEART RATE: 65 BPM | HEIGHT: 67 IN | BODY MASS INDEX: 45.99 KG/M2

## 2019-01-09 DIAGNOSIS — Z12.83 SCREENING EXAM FOR SKIN CANCER: ICD-10-CM

## 2019-01-09 DIAGNOSIS — Z12.39 SCREENING FOR BREAST CANCER: ICD-10-CM

## 2019-01-09 DIAGNOSIS — E03.9 HYPOTHYROIDISM, UNSPECIFIED TYPE: ICD-10-CM

## 2019-01-09 DIAGNOSIS — L98.9 SKIN LESION: ICD-10-CM

## 2019-01-09 DIAGNOSIS — R06.83 SNORING: ICD-10-CM

## 2019-01-09 DIAGNOSIS — E66.01 MORBID OBESITY WITH BMI OF 60.0-69.9, ADULT: ICD-10-CM

## 2019-01-09 DIAGNOSIS — R73.01 IMPAIRED FASTING BLOOD SUGAR: Primary | ICD-10-CM

## 2019-01-09 PROCEDURE — 3008F BODY MASS INDEX DOCD: CPT | Mod: CPTII,S$GLB,, | Performed by: FAMILY MEDICINE

## 2019-01-09 PROCEDURE — 99999 PR PBB SHADOW E&M-EST. PATIENT-LVL IV: ICD-10-PCS | Mod: PBBFAC,,, | Performed by: FAMILY MEDICINE

## 2019-01-09 PROCEDURE — 3077F PR MOST RECENT SYSTOLIC BLOOD PRESSURE >= 140 MM HG: ICD-10-PCS | Mod: CPTII,S$GLB,, | Performed by: FAMILY MEDICINE

## 2019-01-09 PROCEDURE — 3008F PR BODY MASS INDEX (BMI) DOCUMENTED: ICD-10-PCS | Mod: CPTII,S$GLB,, | Performed by: FAMILY MEDICINE

## 2019-01-09 PROCEDURE — 3077F SYST BP >= 140 MM HG: CPT | Mod: CPTII,S$GLB,, | Performed by: FAMILY MEDICINE

## 2019-01-09 PROCEDURE — 99214 PR OFFICE/OUTPT VISIT, EST, LEVL IV, 30-39 MIN: ICD-10-PCS | Mod: S$GLB,,, | Performed by: FAMILY MEDICINE

## 2019-01-09 PROCEDURE — 3079F DIAST BP 80-89 MM HG: CPT | Mod: CPTII,S$GLB,, | Performed by: FAMILY MEDICINE

## 2019-01-09 PROCEDURE — 99999 PR PBB SHADOW E&M-EST. PATIENT-LVL IV: CPT | Mod: PBBFAC,,, | Performed by: FAMILY MEDICINE

## 2019-01-09 PROCEDURE — 3079F PR MOST RECENT DIASTOLIC BLOOD PRESSURE 80-89 MM HG: ICD-10-PCS | Mod: CPTII,S$GLB,, | Performed by: FAMILY MEDICINE

## 2019-01-09 PROCEDURE — 99214 OFFICE O/P EST MOD 30 MIN: CPT | Mod: S$GLB,,, | Performed by: FAMILY MEDICINE

## 2019-01-09 RX ORDER — METFORMIN HYDROCHLORIDE 500 MG/1
TABLET, EXTENDED RELEASE ORAL
Qty: 90 TABLET | Refills: 3 | Status: SHIPPED | OUTPATIENT
Start: 2019-01-09 | End: 2019-03-29 | Stop reason: SDUPTHER

## 2019-01-09 RX ORDER — LEVOTHYROXINE SODIUM 50 UG/1
TABLET ORAL
Qty: 90 TABLET | Refills: 3 | Status: SHIPPED | OUTPATIENT
Start: 2019-01-09 | End: 2019-08-23 | Stop reason: SDUPTHER

## 2019-01-09 NOTE — PROGRESS NOTES
CHIEF COMPLAINT:  Follow up impaired fasting blood sugar, hypothyroidism      HISTORY OF PRESENT ILLNESS:  Lavinia Herrera is a 43 y.o. female who presents to clinic as a for follow up. She has impaired fasting blood sugar and is on metformin. A recent Hga1c was 5.7%, decreased from 6.3%. She has recently been off of a ketogenic diet. She was referred to [facility] but states that it is out of her budget at this time. She has hypothyroidism and is on levothyroxine. A recent TSH was at goal.   She is due for a screening mammogram. She is has not yet scheduled her sleep study.     REVIEW OF SYSTEMS:  The patient denies any fever, chills, night sweats, headaches, vision changes, difficulty speaking or swallowing, decreased hearing,  chest pain, palpitations, shortness of breath, cough, nausea, vomiting, abdominal pain, dysuria, diarrhea, constipation, hematuria, hematochezia, melena, changes in her hair, skin, nails, numbness or weakness in her extremities, erythema, pain or swelling over any of her joints, myalgia, swollen glands, easy bruising, fatigue, edema, symptoms of anxiety or depression.       MEDICATIONS:   Reviewed and/or reconciled in EPIC    ALLERGIES:  Reviewed and/or reconciled in [facility]    PAST MEDICAL/SURGICAL HISTORY:   Past Medical History:   Diagnosis Date    Allergy     Morbid obesity     Otitis media     Seasonal allergies       Past Surgical History:   Procedure Laterality Date    MANDIBLE OSTEOTOMY      MYRINGOTOMY WITH INSERTION OF PE TUBES N/A 6/30/2014    [facility]    TYMPANOSTOMY TUBE PLACEMENT  6/30/14       FAMILY HISTORY:    Family History   Problem Relation Age of Onset    Hypertension Mother     Asthma Mother     Arthritis Mother     Diabetes Maternal Grandmother     Heart disease Maternal Grandmother         CHF    Breast cancer Neg Hx     Colon cancer Neg Hx     Ovarian cancer Neg Hx     Cancer Neg Hx     Allergic rhinitis Neg  "Hx     Allergies Neg Hx     Angioedema Neg Hx     Atopy Neg Hx     Eczema Neg Hx     Immunodeficiency Neg Hx     Rhinitis Neg Hx     Urticaria Neg Hx        SOCIAL HISTORY:    Social History     Socioeconomic History    Marital status:      Spouse name: Not on file    Number of children: Not on file    Years of education: Not on file    Highest education level: Not on file   Social Needs    Financial resource strain: Not on file    Food insecurity - worry: Not on file    Food insecurity - inability: Not on file    Transportation needs - medical: Not on file    Transportation needs - non-medical: Not on file   Occupational History    Not on file   Tobacco Use    Smoking status: Former Smoker     Last attempt to quit: 4/3/1998     Years since quittin.7    Smokeless tobacco: Never Used   Substance and Sexual Activity    Alcohol use: Yes     Comment: occasional    Drug use: No    Sexual activity: Yes     Partners: Male     Birth control/protection: None   Other Topics Concern    Not on file   Social History Narrative    Not on file       PHYSICAL EXAM:  VITAL SIGNS:   Vitals:    19 1726   BP: (!) 144/85   Pulse: 65   Temp: 98.4 °F (36.9 °C)   Weight: (!) 181.6 kg (400 lb 5.7 oz)   Height: 5' 7" (1.702 m)     GENERAL:  Patient appears well nourished, sitting on exam table, in no acute distress.  HEENT:  Atraumatic, normocephalic, PERRLA, EOMI, no conjunctival injection, sclerae are anicteric, normal external auditory canals,TMs clear b/l, , gross hearing intact to whisper, MMM, no oropharygneal erythema or exudate.  NECK:  Supple, normal ROM, trachea is midline , no supraclavicular or cervical LAD or masses palpated.  Thyroid gland not palpable.  CARDIOVASCULAR:  RRR, normal S1 and S2, no m/r/g.  RESPIRATORY:  CTA b/l, no wheezes, rhonchi, rales.  No increased work of breathing, no  use of accessory muscles.  ABDOMEN:  Soft, nontender, nondistended, normoactive bowel sounds in " all four quadrants, no rebound or guarding, no HSM or masses palpated.  Normal percussion.  EXTREMITIES:  2+ DP pulses b/l, no edema.  SKIN:  Warm, no lesions on exposed skin.  NEUROMUSCULAR:  Cranial nerves II-XII grossly intact.   No clubbing or cyanosis of digits/nails.  Steady gait.  PSYCH:  Patient is alert and oriented to person, time, place. They are appropriately dressed and groomed. There is normal eye contact. Rate and tone of speech is normal. Normal insight, judgement. Normal thought content and process.     LABORATORY/IMAGING STUDIES: pending    ASSESSMENT/PLAN: This is a 43 y.o. female who presents to clinic for evaluation of the following concerns  1. Impaired fasting blood sugar  Continue with metformin, follow up in 6 months for repeat labs.     2. Morbid obesity with BMI of 60.0-69.9, adult  See below    3. Hypothyroidism, unspecified type  Continue with current dose of levothyroxine    4. Skin lesion  - Ambulatory referral to Dermatology    5. Screening exam for skin cancer  - Ambulatory referral to Dermatology    6. Snoring  - Polysomnogram (CPAP will be added if patient meets diagnostic criteria.); Future  -patient provided with contact information for sleep lab at [facility].    7. Screening for breast cancer  - Mammo Digital Screening Bilat; Future    Patient readiness: acceptance and barriers:none    During the course of the visit the patient was educated and counseled about the following:     Obesity:   General weight loss/lifestyle modification strategies discussed (elicit support from others; identify saboteurs; non-food rewards, etc).  Diet interventions: moderate (500 kCal/d) deficit diet.  Informal exercise measures discussed, e.g. taking stairs instead of elevator.  Regular aerobic exercise program discussed.   Restart ketogenic diet.    Goals: Obesity: Reduce calorie intake and BMI    Did patient meet goals/outcomes: No    The following self management tools provided: declined    Patient  Instructions (the written plan) was given to the patient/family.     Time spent with patient: 30 minutes        Cara Murguia MD

## 2019-01-27 DIAGNOSIS — E03.9 HYPOTHYROIDISM, UNSPECIFIED TYPE: ICD-10-CM

## 2019-01-28 RX ORDER — LEVOTHYROXINE SODIUM 50 UG/1
TABLET ORAL
Qty: 30 TABLET | Refills: 2 | OUTPATIENT
Start: 2019-01-28

## 2019-02-04 ENCOUNTER — INITIAL CONSULT (OUTPATIENT)
Dept: DERMATOLOGY | Facility: CLINIC | Age: 44
End: 2019-02-04
Payer: COMMERCIAL

## 2019-02-04 DIAGNOSIS — L82.1 SEBORRHEIC KERATOSES: ICD-10-CM

## 2019-02-04 DIAGNOSIS — Z12.83 SKIN CANCER SCREENING: ICD-10-CM

## 2019-02-04 DIAGNOSIS — D22.9 MULTIPLE BENIGN NEVI: Primary | ICD-10-CM

## 2019-02-04 DIAGNOSIS — L81.4 SOLAR LENTIGO: ICD-10-CM

## 2019-02-04 PROCEDURE — 99203 PR OFFICE/OUTPT VISIT, NEW, LEVL III, 30-44 MIN: ICD-10-PCS | Mod: S$GLB,,, | Performed by: DERMATOLOGY

## 2019-02-04 PROCEDURE — 99203 OFFICE O/P NEW LOW 30 MIN: CPT | Mod: S$GLB,,, | Performed by: DERMATOLOGY

## 2019-02-04 PROCEDURE — 99999 PR PBB SHADOW E&M-EST. PATIENT-LVL II: CPT | Mod: PBBFAC,,, | Performed by: DERMATOLOGY

## 2019-02-04 PROCEDURE — 99999 PR PBB SHADOW E&M-EST. PATIENT-LVL II: ICD-10-PCS | Mod: PBBFAC,,, | Performed by: DERMATOLOGY

## 2019-02-04 NOTE — PROGRESS NOTES
"  Subjective:       Patient ID:  Lavinia Herrera is a 44 y.o. female who presents for   Chief Complaint   Patient presents with    Skin Check     TBSE    Spot     R temple     Initial visit  No h/o skin cancer, no FH melanoma  "I lived in the sun", intense recreational sun exposure  Tans easily        Spot  - Initial  Affected locations: R temple.  Duration: 2 months  Signs / symptoms: asymptomatic  Severity: mild  Timing: constant  Aggravated by: nothing  Relieving factors/Treatments tried: nothing  Improvement on treatment: no relief        Review of Systems   Constitutional: Negative for fever, chills and fatigue.   Skin: Positive for daily sunscreen use and activity-related sunscreen use. Negative for wears hat.   Hematologic/Lymphatic: Does not bruise/bleed easily.        Objective:    Physical Exam   Constitutional: She appears well-developed and well-nourished. No distress.   Neurological: She is alert and oriented to person, place, and time. She is not disoriented.   Psychiatric: She has a normal mood and affect.   Skin:   Areas Examined (abnormalities noted in diagram):   Scalp / Hair Palpated and Inspected  Head / Face Inspection Performed  Neck Inspection Performed  Chest / Axilla Inspection Performed  Abdomen Inspection Performed  Genitals / Buttocks / Groin Inspection Performed  Back Inspection Performed  RUE Inspected  LUE Inspection Performed  RLE Inspected  LLE Inspection Performed  Nails and Digits Inspection Performed                           Diagram Legend     Erythematous scaling macule/papule c/w actinic keratosis       Vascular papule c/w angioma      Pigmented verrucoid papule/plaque c/w seborrheic keratosis      Yellow umbilicated papule c/w sebaceous hyperplasia      Irregularly shaped tan macule c/w lentigo     1-2 mm smooth white papules consistent with Milia      Movable subcutaneous cyst with punctum c/w epidermal inclusion cyst      Subcutaneous movable cyst c/w pilar cyst      " Firm pink to brown papule c/w dermatofibroma      Pedunculated fleshy papule(s) c/w skin tag(s)      Evenly pigmented macule c/w junctional nevus     Mildly variegated pigmented, slightly irregular-bordered macule c/w mildly atypical nevus      Flesh colored to evenly pigmented papule c/w intradermal nevus       Pink pearly papule/plaque c/w basal cell carcinoma      Erythematous hyperkeratotic cursted plaque c/w SCC      Surgical scar with no sign of skin cancer recurrence      Open and closed comedones      Inflammatory papules and pustules      Verrucoid papule consistent consistent with wart     Erythematous eczematous patches and plaques     Dystrophic onycholytic nail with subungual debris c/w onychomycosis     Umbilicated papule    Erythematous-base heme-crusted tan verrucoid plaque consistent with inflamed seborrheic keratosis     Erythematous Silvery Scaling Plaque c/w Psoriasis     See annotation      Assessment / Plan:        Multiple benign nevi  Discussed ABCDE's of nevi.  Monitor for new mole or moles that are becoming bigger, darker, irritated, or developing irregular borders. Brochure provided.    Seborrheic keratoses  These are benign inherited growths without a malignant potential. Reassurance given to patient. No treatment is necessary.     Solar lentigo  This is a benign hyperpigmented sun induced lesion. Daily sun protection will reduce the number of new lesions. Treatment of these benign lesions are considered cosmetic.    Skin cancer screening  Total body skin examination performed today including at least 12 points as noted in physical examination. No lesions suspicious for malignancy noted.    Patient instructed in importance in daily sun protection of at least spf 30. Mineral sunscreen ingredients preferred (Zinc +/- Titanium).   Recommend Elta MD for daily use on face and neck.  Patient encouraged to wear hat for all outdoor exposure.   Also discussed sun avoidance and use of protective  clothing.             Follow-up if symptoms worsen or fail to improve.

## 2019-02-04 NOTE — LETTER
February 4, 2019      Cara Murguia MD  [address]  [place] [address]           [place] [facility]  [address]  [place] [address]  Phone: [phone]          Patient: Lavinia Herrera   MR Number: [id number]   YOB: 1975   Date of Visit: 2/4/2019       Dear Dr. Cara Murguia:    Thank you for referring Lavinia Herrera to me for evaluation. Attached you will find relevant portions of my assessment and plan of care.    If you have questions, please do not hesitate to call me. I look forward to following Lavinia Herrera along with you.    Sincerely,    Kalpana Conner MD    Enclosure  CC:  No Recipients    If you would like to receive this communication electronically, please contact [email or web address] or [phone] to request more information on [facility] [employer] Link access.    For providers and/or their staff who would like to refer a patient to [facility], please contact us through our one-stop-shop provider referral line, [facility], at [phone].    If you feel you have received this communication in error or would no longer like to receive these types of communications, please e-mail [email or web address]

## 2019-02-15 ENCOUNTER — HOSPITAL ENCOUNTER (OUTPATIENT)
Dept: RADIOLOGY | Facility: CLINIC | Age: 44
Discharge: HOME | End: 2019-02-15
Attending: FAMILY MEDICINE
Payer: COMMERCIAL

## 2019-02-15 DIAGNOSIS — Z12.39 SCREENING FOR BREAST CANCER: ICD-10-CM

## 2019-02-15 PROCEDURE — 77063 BREAST TOMOSYNTHESIS BI: CPT | Mod: 26,,, | Performed by: RADIOLOGY

## 2019-02-15 PROCEDURE — 77067 SCR MAMMO BI INCL CAD: CPT | Mod: TC,PO

## 2019-02-15 PROCEDURE — 77067 SCR MAMMO BI INCL CAD: CPT | Mod: 26,,, | Performed by: RADIOLOGY

## 2019-02-15 PROCEDURE — 77063 MAMMO DIGITAL SCREENING BILAT WITH TOMOSYNTHESIS_CAD: ICD-10-PCS | Mod: 26,,, | Performed by: RADIOLOGY

## 2019-02-15 PROCEDURE — 77067 MAMMO DIGITAL SCREENING BILAT WITH TOMOSYNTHESIS_CAD: ICD-10-PCS | Mod: 26,,, | Performed by: RADIOLOGY

## 2019-03-07 ENCOUNTER — PATIENT MESSAGE (OUTPATIENT)
Dept: FAMILY MEDICINE | Facility: CLINIC | Age: 44
End: 2019-03-07

## 2019-03-08 ENCOUNTER — TELEPHONE (OUTPATIENT)
Dept: FAMILY MEDICINE | Facility: CLINIC | Age: 44
End: 2019-03-08

## 2019-03-08 ENCOUNTER — PATIENT MESSAGE (OUTPATIENT)
Dept: FAMILY MEDICINE | Facility: CLINIC | Age: 44
End: 2019-03-08

## 2019-03-08 ENCOUNTER — HOSPITAL ENCOUNTER (OUTPATIENT)
Dept: RADIOLOGY | Facility: CLINIC | Age: 44
Discharge: HOME | End: 2019-03-08
Attending: NURSE PRACTITIONER
Payer: COMMERCIAL

## 2019-03-08 ENCOUNTER — OFFICE VISIT (OUTPATIENT)
Dept: FAMILY MEDICINE | Facility: CLINIC | Age: 44
End: 2019-03-08
Payer: COMMERCIAL

## 2019-03-08 VITALS
OXYGEN SATURATION: 96 % | HEIGHT: 67 IN | BODY MASS INDEX: 45.99 KG/M2 | DIASTOLIC BLOOD PRESSURE: 82 MMHG | SYSTOLIC BLOOD PRESSURE: 145 MMHG | HEART RATE: 69 BPM | WEIGHT: 293 LBS | TEMPERATURE: 100 F

## 2019-03-08 DIAGNOSIS — R05.9 COUGH: Primary | ICD-10-CM

## 2019-03-08 DIAGNOSIS — J40 BRONCHITIS: ICD-10-CM

## 2019-03-08 DIAGNOSIS — R06.2 WHEEZING: ICD-10-CM

## 2019-03-08 DIAGNOSIS — R05.9 COUGH: ICD-10-CM

## 2019-03-08 PROCEDURE — 3077F SYST BP >= 140 MM HG: CPT | Mod: CPTII,S$GLB,, | Performed by: NURSE PRACTITIONER

## 2019-03-08 PROCEDURE — 3008F BODY MASS INDEX DOCD: CPT | Mod: CPTII,S$GLB,, | Performed by: NURSE PRACTITIONER

## 2019-03-08 PROCEDURE — 71046 X-RAY EXAM CHEST 2 VIEWS: CPT | Mod: TC,FY,PO

## 2019-03-08 PROCEDURE — 3079F DIAST BP 80-89 MM HG: CPT | Mod: CPTII,S$GLB,, | Performed by: NURSE PRACTITIONER

## 2019-03-08 PROCEDURE — 3008F PR BODY MASS INDEX (BMI) DOCUMENTED: ICD-10-PCS | Mod: CPTII,S$GLB,, | Performed by: NURSE PRACTITIONER

## 2019-03-08 PROCEDURE — 71046 X-RAY EXAM CHEST 2 VIEWS: CPT | Mod: 26,,, | Performed by: RADIOLOGY

## 2019-03-08 PROCEDURE — 99213 OFFICE O/P EST LOW 20 MIN: CPT | Mod: S$GLB,,, | Performed by: NURSE PRACTITIONER

## 2019-03-08 PROCEDURE — 99999 PR PBB SHADOW E&M-EST. PATIENT-LVL III: CPT | Mod: PBBFAC,,, | Performed by: NURSE PRACTITIONER

## 2019-03-08 PROCEDURE — 99999 PR PBB SHADOW E&M-EST. PATIENT-LVL III: ICD-10-PCS | Mod: PBBFAC,,, | Performed by: NURSE PRACTITIONER

## 2019-03-08 PROCEDURE — 99213 PR OFFICE/OUTPT VISIT, EST, LEVL III, 20-29 MIN: ICD-10-PCS | Mod: S$GLB,,, | Performed by: NURSE PRACTITIONER

## 2019-03-08 PROCEDURE — 3079F PR MOST RECENT DIASTOLIC BLOOD PRESSURE 80-89 MM HG: ICD-10-PCS | Mod: CPTII,S$GLB,, | Performed by: NURSE PRACTITIONER

## 2019-03-08 PROCEDURE — 3077F PR MOST RECENT SYSTOLIC BLOOD PRESSURE >= 140 MM HG: ICD-10-PCS | Mod: CPTII,S$GLB,, | Performed by: NURSE PRACTITIONER

## 2019-03-08 PROCEDURE — 71046 XR CHEST PA AND LATERAL: ICD-10-PCS | Mod: 26,,, | Performed by: RADIOLOGY

## 2019-03-08 RX ORDER — PROMETHAZINE HYDROCHLORIDE AND DEXTROMETHORPHAN HYDROBROMIDE 6.25; 15 MG/5ML; MG/5ML
5 SYRUP ORAL NIGHTLY
Qty: 180 ML | Refills: 0 | Status: SHIPPED | OUTPATIENT
Start: 2019-03-08 | End: 2019-03-08

## 2019-03-08 RX ORDER — DOXYCYCLINE HYCLATE 100 MG
100 TABLET ORAL EVERY 12 HOURS
Qty: 14 TABLET | Refills: 0 | Status: SHIPPED | OUTPATIENT
Start: 2019-03-08 | End: 2019-03-15

## 2019-03-08 RX ORDER — ALBUTEROL SULFATE 90 UG/1
2 AEROSOL, METERED RESPIRATORY (INHALATION) EVERY 6 HOURS PRN
Qty: 18 G | Refills: 0 | Status: SHIPPED | OUTPATIENT
Start: 2019-03-08 | End: 2021-03-12

## 2019-03-08 RX ORDER — BENZONATATE 100 MG/1
100 CAPSULE ORAL 3 TIMES DAILY PRN
Qty: 30 CAPSULE | Refills: 0 | Status: SHIPPED | OUTPATIENT
Start: 2019-03-08 | End: 2019-03-18

## 2019-03-08 RX ORDER — PROMETHAZINE HYDROCHLORIDE AND DEXTROMETHORPHAN HYDROBROMIDE 6.25; 15 MG/5ML; MG/5ML
5 SYRUP ORAL NIGHTLY
Qty: 180 ML | Refills: 0 | Status: SHIPPED | OUTPATIENT
Start: 2019-03-08 | End: 2019-03-18

## 2019-03-08 NOTE — PROGRESS NOTES
Subjective:       Patient ID: Lavinia Herrera is a 44 y.o. female.    Chief Complaint: Urinary Tract Infection    HPI   Ms. Herrera is a 45 yo female who presents today with c/o continued productive cough, of brown/green mucous.  She also reports some congestion and headache.  She was previously seen by Dr. Alexander and prescribed oral steroids and a z-pack. She completed these medications on 3/3, and actually feels that she is worse now. She reports that this has not helped resolve the symptoms.   She has been using mucinex OTC to help,  This provides some relief.  She denies fever, chills and body aches.    Review of Systems   Constitutional: Negative for chills and fever.   HENT: Positive for postnasal drip, rhinorrhea and sore throat. Negative for ear pain.    Eyes: Negative for pain, discharge and itching.   Respiratory: Positive for cough, shortness of breath and wheezing.    Cardiovascular: Negative for chest pain and palpitations.   Gastrointestinal: Negative for diarrhea, nausea and vomiting.   Genitourinary: Negative for dysuria, flank pain and frequency.   Musculoskeletal: Negative for myalgias.   Skin: Negative for rash.   Allergic/Immunologic: Negative for environmental allergies.   Neurological: Positive for headaches. Negative for dizziness and light-headedness.   Psychiatric/Behavioral: Negative for dysphoric mood.       Objective:      Physical Exam   Constitutional: She is oriented to person, place, and time. She appears well-developed and well-nourished. She appears ill.   HENT:   Head: Normocephalic and atraumatic.   Eyes: Conjunctivae are normal. Pupils are equal, round, and reactive to light. Right eye exhibits no discharge. Left eye exhibits no discharge.   Neck: Normal range of motion. Neck supple. No thyromegaly present.   Cardiovascular: Normal rate, regular rhythm, normal heart sounds and intact distal pulses.   Pulmonary/Chest: No respiratory distress. She has wheezes. She has no rhonchi.  She has no rales.   Abdominal: Soft. Bowel sounds are normal. She exhibits no distension. There is no tenderness. There is no rebound.   Musculoskeletal: Normal range of motion. She exhibits no edema or deformity.   Lymphadenopathy:     She has no cervical adenopathy.   Neurological: She is alert and oriented to person, place, and time. No cranial nerve deficit or sensory deficit.   Skin: Skin is warm and dry. No rash noted.   Psychiatric: She has a normal mood and affect.       Assessment:       1. Cough    2. Wheezing        Plan:       Cough  -     X-Ray Chest PA And Lateral; Future; Expected date: 03/08/2019        -     Will call with results of above and plan accordingly   Wheezing  -     X-Ray Chest PA And Lateral; Future; Expected date: 03/08/2019        -     Will call with results above and plan accordingly

## 2019-03-08 NOTE — TELEPHONE ENCOUNTER
----- Message from Tova Pan MA sent at 3/8/2019  1:53 PM CST -----  Contact: [facility] with [facility]      ----- Message -----  From: Lilia Calderon  Sent: 3/8/2019  11:40 AM  To: Harsha Blair Staff    Rx promethazine DM and they do not have this medication and its on back order and to e-scribe something else [phone] and fax # [phone]

## 2019-03-29 RX ORDER — METFORMIN HYDROCHLORIDE 500 MG/1
TABLET, EXTENDED RELEASE ORAL
Qty: 90 TABLET | Refills: 0 | Status: SHIPPED | OUTPATIENT
Start: 2019-03-29 | End: 2021-03-12

## 2019-05-01 DIAGNOSIS — F32.A ANXIETY AND DEPRESSION: ICD-10-CM

## 2019-05-01 DIAGNOSIS — F41.9 ANXIETY AND DEPRESSION: ICD-10-CM

## 2019-05-02 RX ORDER — ESCITALOPRAM OXALATE 20 MG/1
TABLET ORAL
Qty: 90 TABLET | Refills: 0 | Status: SHIPPED | OUTPATIENT
Start: 2019-05-02 | End: 2019-07-23 | Stop reason: SDUPTHER

## 2019-05-20 ENCOUNTER — HOSPITAL ENCOUNTER (OUTPATIENT)
Dept: RADIOLOGY | Facility: CLINIC | Age: 44
Discharge: HOME | End: 2019-05-20
Attending: PODIATRIST
Payer: COMMERCIAL

## 2019-05-20 ENCOUNTER — OFFICE VISIT (OUTPATIENT)
Dept: PODIATRY | Facility: CLINIC | Age: 44
End: 2019-05-20
Payer: COMMERCIAL

## 2019-05-20 VITALS
HEART RATE: 65 BPM | WEIGHT: 293 LBS | DIASTOLIC BLOOD PRESSURE: 80 MMHG | HEIGHT: 67 IN | BODY MASS INDEX: 45.99 KG/M2 | SYSTOLIC BLOOD PRESSURE: 149 MMHG

## 2019-05-20 DIAGNOSIS — M24.573 EQUINUS CONTRACTURE OF ANKLE: ICD-10-CM

## 2019-05-20 DIAGNOSIS — M79.671 FOOT PAIN, RIGHT: ICD-10-CM

## 2019-05-20 DIAGNOSIS — M72.2 PLANTAR FASCIITIS: Primary | ICD-10-CM

## 2019-05-20 DIAGNOSIS — M72.2 PLANTAR FASCIITIS: ICD-10-CM

## 2019-05-20 PROCEDURE — 73630 XR FOOT COMPLETE 3 VIEW RIGHT: ICD-10-PCS | Mod: 26,RT,S$GLB, | Performed by: RADIOLOGY

## 2019-05-20 PROCEDURE — 29540 PR STRAPPING; ANKLE &/OR FOOT: ICD-10-PCS | Mod: RT,S$GLB,, | Performed by: PODIATRIST

## 2019-05-20 PROCEDURE — 99999 PR PBB SHADOW E&M-EST. PATIENT-LVL III: ICD-10-PCS | Mod: PBBFAC,,, | Performed by: PODIATRIST

## 2019-05-20 PROCEDURE — 73630 X-RAY EXAM OF FOOT: CPT | Mod: 26,RT,S$GLB, | Performed by: RADIOLOGY

## 2019-05-20 PROCEDURE — 3077F SYST BP >= 140 MM HG: CPT | Mod: CPTII,S$GLB,, | Performed by: PODIATRIST

## 2019-05-20 PROCEDURE — 3077F PR MOST RECENT SYSTOLIC BLOOD PRESSURE >= 140 MM HG: ICD-10-PCS | Mod: CPTII,S$GLB,, | Performed by: PODIATRIST

## 2019-05-20 PROCEDURE — 99203 OFFICE O/P NEW LOW 30 MIN: CPT | Mod: 25,S$GLB,, | Performed by: PODIATRIST

## 2019-05-20 PROCEDURE — 3079F DIAST BP 80-89 MM HG: CPT | Mod: CPTII,S$GLB,, | Performed by: PODIATRIST

## 2019-05-20 PROCEDURE — 73630 X-RAY EXAM OF FOOT: CPT | Mod: TC,FY,PO,RT

## 2019-05-20 PROCEDURE — 99203 PR OFFICE/OUTPT VISIT, NEW, LEVL III, 30-44 MIN: ICD-10-PCS | Mod: 25,S$GLB,, | Performed by: PODIATRIST

## 2019-05-20 PROCEDURE — 29540 STRAPPING ANKLE &/FOOT: CPT | Mod: RT,S$GLB,, | Performed by: PODIATRIST

## 2019-05-20 PROCEDURE — 3008F PR BODY MASS INDEX (BMI) DOCUMENTED: ICD-10-PCS | Mod: CPTII,S$GLB,, | Performed by: PODIATRIST

## 2019-05-20 PROCEDURE — 3008F BODY MASS INDEX DOCD: CPT | Mod: CPTII,S$GLB,, | Performed by: PODIATRIST

## 2019-05-20 PROCEDURE — 99999 PR PBB SHADOW E&M-EST. PATIENT-LVL III: CPT | Mod: PBBFAC,,, | Performed by: PODIATRIST

## 2019-05-20 PROCEDURE — 3079F PR MOST RECENT DIASTOLIC BLOOD PRESSURE 80-89 MM HG: ICD-10-PCS | Mod: CPTII,S$GLB,, | Performed by: PODIATRIST

## 2019-05-20 RX ORDER — DICLOFENAC SODIUM 10 MG/G
2 GEL TOPICAL 4 TIMES DAILY
Qty: 100 G | Refills: 2 | Status: SHIPPED | OUTPATIENT
Start: 2019-05-20 | End: 2021-03-12

## 2019-05-20 NOTE — PROGRESS NOTES
Subjective:      Patient ID: Lavinia Herrera is a 44 y.o. female.    Chief Complaint: Foot Pain (Right )    Sharp deep pain bottom right foot.  Gradual onset, worsening over past several weeks, aggravated by increased weight bearing, shoe gear, pressure.  No previous medical treatment.  OTC pain med not helping. Denies trauma, surgery.    Review of Systems   Constitution: Negative for chills, diaphoresis, fever, malaise/fatigue and night sweats.   Cardiovascular: Negative for claudication, cyanosis, leg swelling and syncope.   Skin: Negative for color change, dry skin, nail changes, rash, suspicious lesions and unusual hair distribution.   Musculoskeletal: Negative for falls, joint pain, joint swelling, muscle cramps, muscle weakness and stiffness.   Gastrointestinal: Negative for constipation, diarrhea, nausea and vomiting.   Neurological: Negative for brief paralysis, disturbances in coordination, focal weakness, numbness, paresthesias, sensory change and tremors.           Objective:      Physical Exam   Constitutional: She is oriented to person, place, and time. She appears well-developed and well-nourished. She is cooperative. No distress.   Cardiovascular:   Pulses:       Popliteal pulses are 2+ on the right side, and 2+ on the left side.        Dorsalis pedis pulses are 2+ on the right side, and 2+ on the left side.        Posterior tibial pulses are 2+ on the right side, and 2+ on the left side.   Capillary refill 3 seconds all toes/distal feet, all toes/both feet warm to touch.      Negative lymphadenopathy bilateral popliteal fossa and tarsal tunnel.      Negavie lower extremity edema bilateral.     Musculoskeletal:        Right ankle: She exhibits normal range of motion, no swelling, no ecchymosis, no deformity, no laceration and normal pulse. Achilles tendon normal. Achilles tendon exhibits no pain, no defect and normal Gandhi's test results.   Sharp deep pain to palpation inferior heel right at  medial calcaneal tubercle without ecchymosis, erythema, edema, or cardinal signs infection, and no signs of trauma.    Ankle dorsiflexion decreased at <10 degrees bilateral with moderate increase with knee flexion bilateral.    Otherwise, Normal angle, base, station of gait. All ten toes without clubbing, cyanosis, or signs of ischemia.  No pain to palpation bilateral lower extremities.  Range of motion, stability, muscle strength, and muscle tone normal bilateral feet and legs.    Lymphadenopathy: No inguinal adenopathy noted on the right or left side.   Negative lymphadenopathy bilateral popliteal fossa and tarsal tunnel.    Negative lymphangitic streaking bilateral feet/ankles/legs.   Neurological: She is alert and oriented to person, place, and time. She has normal strength. She displays no atrophy and no tremor. No sensory deficit. She exhibits normal muscle tone. Gait normal.   Reflex Scores:       Patellar reflexes are 2+ on the right side and 2+ on the left side.       Achilles reflexes are 2+ on the right side and 2+ on the left side.  Negative tinel sign to percussion sural, superficial peroneal, deep peroneal, saphenous, and posterior tibial nerves right and left ankles and feet.     Skin: Skin is warm, dry and intact. Capillary refill takes 2 to 3 seconds. No abrasion, no bruising, no burn, no ecchymosis, no laceration, no lesion and no rash noted. She is not diaphoretic. No cyanosis or erythema. No pallor. Nails show no clubbing.     Skin is normal age and health appropriate color, turgor, texture, and temperature bilateral lower extremities without ulceration, hyperpigmentation, discoloration, masses nodules or cords palpated.  No ecchymosis, erythema, edema, or cardinal signs of infection bilateral lower extremities.     Psychiatric: She has a normal mood and affect.             Assessment:       Encounter Diagnoses   Name Primary?    Plantar fasciitis Yes    Foot pain, right     Equinus  contracture of ankle          Plan:       Lavinia was seen today for foot pain.    Diagnoses and all orders for this visit:    Plantar fasciitis  -     X-Ray Foot Complete Right; Future    Foot pain, right  -     X-Ray Foot Complete Right; Future    Equinus contracture of ankle  -     X-Ray Foot Complete Right; Future    Other orders  -     diclofenac sodium (VOLTAREN) 1 % Gel; Apply 2 g topically 4 (four) times daily.      I counseled the patient on her conditions, their implications and medical management.        Patient will stretch the tendo achilles complex three times daily as demonstrated in the office.  Literature was dispensed illustrating proper stretching technique.    I applied a plantar rest strapping to the patient's right foot to offload symptomatic area, support the arch, and relieve pain.    Patient will obtain over the counter arch supports and wear them in shoes whenever possible.  Athletic shoes intended for walking or running are usually best.    The patient was advised that NSAID-type medications have two very important potential side effects: gastrointestinal irritation including hemorrhage and renal injuries. She was asked to take the medication with food and to stop if she experiences any GI upset. I asked her to call for vomiting, abdominal pain or black/bloody stools. The patient expresses understanding of these issues and questions were answered.    Discussed conservative treatment with shoes of adequate dimensions, material, and style to alleviate symptoms and delay or prevent surgical intervention.    Rx xrays, voltaren gel.          Follow up in about 1 month (around 6/20/2019).

## 2019-05-21 ENCOUNTER — HOSPITAL ENCOUNTER (OUTPATIENT)
Dept: RADIOLOGY | Facility: HOSPITAL | Age: 44
Discharge: HOME | End: 2019-05-21
Attending: ORTHOPAEDIC SURGERY
Payer: COMMERCIAL

## 2019-05-21 ENCOUNTER — OFFICE VISIT (OUTPATIENT)
Dept: ORTHOPEDICS | Facility: CLINIC | Age: 44
End: 2019-05-21
Payer: COMMERCIAL

## 2019-05-21 VITALS — RESPIRATION RATE: 20 BRPM | WEIGHT: 293 LBS | BODY MASS INDEX: 45.99 KG/M2 | HEIGHT: 67 IN

## 2019-05-21 DIAGNOSIS — M25.511 RIGHT SHOULDER PAIN, UNSPECIFIED CHRONICITY: ICD-10-CM

## 2019-05-21 DIAGNOSIS — M25.511 ACUTE PAIN OF RIGHT SHOULDER: Primary | ICD-10-CM

## 2019-05-21 DIAGNOSIS — M25.511 RIGHT SHOULDER PAIN, UNSPECIFIED CHRONICITY: Primary | ICD-10-CM

## 2019-05-21 PROCEDURE — 99214 OFFICE O/P EST MOD 30 MIN: CPT | Mod: 25,S$GLB,, | Performed by: ORTHOPAEDIC SURGERY

## 2019-05-21 PROCEDURE — 99214 PR OFFICE/OUTPT VISIT, EST, LEVL IV, 30-39 MIN: ICD-10-PCS | Mod: 25,S$GLB,, | Performed by: ORTHOPAEDIC SURGERY

## 2019-05-21 PROCEDURE — 73030 XR SHOULDER TRAUMA 3 VIEW RIGHT: ICD-10-PCS | Mod: 26,RT,, | Performed by: RADIOLOGY

## 2019-05-21 PROCEDURE — 3008F PR BODY MASS INDEX (BMI) DOCUMENTED: ICD-10-PCS | Mod: CPTII,S$GLB,, | Performed by: ORTHOPAEDIC SURGERY

## 2019-05-21 PROCEDURE — 73030 X-RAY EXAM OF SHOULDER: CPT | Mod: TC,PN,RT

## 2019-05-21 PROCEDURE — 20610 LARGE JOINT ASPIRATION/INJECTION: R SUBACROMIAL BURSA: ICD-10-PCS | Mod: RT,S$GLB,, | Performed by: ORTHOPAEDIC SURGERY

## 2019-05-21 PROCEDURE — 99999 PR PBB SHADOW E&M-EST. PATIENT-LVL III: ICD-10-PCS | Mod: PBBFAC,,, | Performed by: ORTHOPAEDIC SURGERY

## 2019-05-21 PROCEDURE — 3008F BODY MASS INDEX DOCD: CPT | Mod: CPTII,S$GLB,, | Performed by: ORTHOPAEDIC SURGERY

## 2019-05-21 PROCEDURE — 73030 X-RAY EXAM OF SHOULDER: CPT | Mod: 26,RT,, | Performed by: RADIOLOGY

## 2019-05-21 PROCEDURE — 20610 DRAIN/INJ JOINT/BURSA W/O US: CPT | Mod: RT,S$GLB,, | Performed by: ORTHOPAEDIC SURGERY

## 2019-05-21 PROCEDURE — 99999 PR PBB SHADOW E&M-EST. PATIENT-LVL III: CPT | Mod: PBBFAC,,, | Performed by: ORTHOPAEDIC SURGERY

## 2019-05-21 RX ADMIN — TRIAMCINOLONE ACETONIDE 40 MG: 40 INJECTION, SUSPENSION INTRA-ARTICULAR; INTRAMUSCULAR at 10:05

## 2019-05-22 RX ORDER — TRIAMCINOLONE ACETONIDE 40 MG/ML
40 INJECTION, SUSPENSION INTRA-ARTICULAR; INTRAMUSCULAR
Status: DISCONTINUED | OUTPATIENT
Start: 2019-05-21 | End: 2019-05-23 | Stop reason: HOSPADM

## 2019-05-23 NOTE — PROGRESS NOTES
Past Medical History:   Diagnosis Date    Allergy     Morbid obesity     Otitis media     Seasonal allergies        Past Surgical History:   Procedure Laterality Date    MANDIBLE OSTEOTOMY      MYRINGOTOMY WITH INSERTION OF PE TUBES N/A 6/30/2014    Performed by Jaylen Patterson MD at [facility] OR    TYMPANOSTOMY TUBE PLACEMENT  6/30/14       Current Outpatient Medications   Medication Sig    albuterol (VENTOLIN HFA) 90 mcg/actuation inhaler Inhale 2 puffs into the lungs every 6 (six) hours as needed for Wheezing. Rescue    diclofenac sodium (VOLTAREN) 1 % Gel Apply 2 g topically 4 (four) times daily.    DOCUSATE SODIUM (COLACE ORAL) Take 1 tablet by mouth once daily.    escitalopram oxalate (LEXAPRO) 20 MG tablet TAKE 1 TABLET(20 MG) BY MOUTH EVERY DAY    levonorgestrel (MIRENA) 20 mcg/24 hr (5 years) IUD 1 each by Intrauterine route once.    levothyroxine (SYNTHROID) 50 MCG tablet TAKE 1 TABLET(50 MCG) BY MOUTH EVERY DAY    meloxicam (MOBIC) 15 MG tablet Take 1 tablet (15 mg total) by mouth daily as needed for Pain.    metFORMIN (GLUCOPHAGE-XR) 500 MG 24 hr tablet TAKE 1 TABLET(500 MG) BY MOUTH EVERY EVENING    pantoprazole (PROTONIX) 40 MG tablet TAKE 1 TABLET(40 MG) BY MOUTH EVERY DAY    triamcinolone acetonide 0.025% (KENALOG) 0.025 % Oint Apply topically 2 (two) times daily. for 10 days     No current facility-administered medications for this visit.        Review of patient's allergies indicates:  No Known Allergies    Family History   Problem Relation Age of Onset    Hypertension Mother     Asthma Mother     Arthritis Mother     Diabetes Maternal Grandmother     Heart disease Maternal Grandmother         CHF    Breast cancer Neg Hx     Colon cancer Neg Hx     Ovarian cancer Neg Hx     Cancer Neg Hx     Allergic rhinitis Neg Hx     Allergies Neg Hx     Angioedema Neg Hx     Atopy Neg Hx     Eczema Neg Hx     Immunodeficiency Neg Hx     Rhinitis Neg Hx     Urticaria Neg Hx      Lupus Neg Hx     Psoriasis Neg Hx     Melanoma Neg Hx        Social History     Socioeconomic History    Marital status:      Spouse name: Not on file    Number of children: Not on file    Years of education: Not on file    Highest education level: Not on file   Occupational History    Not on file   Social Needs    Financial resource strain: Not on file    Food insecurity:     Worry: Not on file     Inability: Not on file    Transportation needs:     Medical: Not on file     Non-medical: Not on file   Tobacco Use    Smoking status: Former Smoker     Last attempt to quit: 4/3/1998     Years since quittin.1    Smokeless tobacco: Never Used   Substance and Sexual Activity    Alcohol use: Yes     Comment: occasional    Drug use: No    Sexual activity: Yes     Partners: Male     Birth control/protection: None   Lifestyle    Physical activity:     Days per week: Not on file     Minutes per session: Not on file    Stress: Not on file   Relationships    Social connections:     Talks on phone: Not on file     Gets together: Not on file     Attends Orthodox service: Not on file     Active member of club or organization: Not on file     Attends meetings of clubs or organizations: Not on file     Relationship status: Not on file   Other Topics Concern    Are you pregnant or think you may be? Not Asked    Breast-feeding Not Asked   Social History Narrative    Not on file       Chief Complaint:   Chief Complaint   Patient presents with    Right Shoulder - Pain       Consulting Physician: No ref. provider found    History of present illness:    This is a 44 y.o. female who complains of new right shoulder pain for 3 weeks. Denies injury. Pain 6/10 and worse with use.    Review of Systems:    Constitution: Denies chills, fever, and sweats.  HENT: Denies headaches or blurry vision.  Cardiovascular: Denies chest pain or irregular heart beat.  Respiratory: Denies cough or shortness of  "breath.  Gastrointestinal: Denies abdominal pain, nausea, or vomiting.  Musculoskeletal:  Denies muscle cramps.  Neurological: Denies dizziness or focal weakness.  Psychiatric/Behavioral: Normal mental status.  Hematologic/Lymphatic: Denies bleeding problem or easy bruising/bleeding.  Skin: Denies rash or suspicious lesions.    Examination:    Vital Signs:    Vitals:    05/21/19 1308   Resp: 20   Weight: (!) 181.9 kg (401 lb)   Height: 5' 7" (1.702 m)   PainSc:   2   PainLoc: Shoulder       Body mass index is 62.81 kg/m².    This a well-developed, well nourished patient in no acute distress.    Alert and oriented x 3 and cooperative to examination.       Physical Exam: Right Shoulder Exam     Skin  Rash:   None  Scars:   None    Inspection/Observation  Swelling:   None  Erythema:   None  Bruising:   None  Wounds:   None  Scapular Winging:  None  Scapular Dyskinesia:  None  Atrophy:   None  Masses:   None  Lymphadenopathy: None    Tenderness   AC Joint:   None    Range of Motion   Active Forward Flexion:  180   Active External Rotation:  >45  Active Internal Rotation:  Low Back      Muscle Strength   Forward Flexion:  5/5  External Rotation: 5/5  Internal Rotation:  5/5    Instability:  None    Tests & Signs   Cross Arm:   +  Hawkin's test:   +  Impingement:   +    Other   Sensation:  Normal  Pulse:    2+ radial          Imaging: X-rays ordered and images interpreted today personally by me of right shoulder show calcific tendonitis.        Assessment: Acute pain of right shoulder  -     Large Joint Aspiration/Injection: R subacromial bursa        Plan:  Injected today and will follow.      DISCLAIMER: This note may have been dictated using voice recognition software and may contain grammatical errors.     NOTE: Consult report sent to referring provider via [employer] EMR.  "

## 2019-05-23 NOTE — PROCEDURES
Large Joint Aspiration/Injection: R subacromial bursa  Date/Time: 5/21/2019 10:29 PM  Performed by: Jarrett Tyson MD  Authorized by: Jarrett Tyson MD     Consent Done?:  Yes (Verbal)  Indications:  Pain  Timeout: Prior to procedure the correct patient, procedure, and site was verified      Location:  Shoulder  Site:  R subacromial bursa  Prep: Patient was prepped and draped in usual sterile fashion    Approach:  Lateral  Medications:  40 mg triamcinolone acetonide 40 mg/mL

## 2019-06-04 DIAGNOSIS — B35.1 ONYCHOMYCOSIS: ICD-10-CM

## 2019-06-25 DIAGNOSIS — K21.9 GASTROESOPHAGEAL REFLUX DISEASE, ESOPHAGITIS PRESENCE NOT SPECIFIED: ICD-10-CM

## 2019-07-03 RX ORDER — PANTOPRAZOLE SODIUM 40 MG/1
TABLET, DELAYED RELEASE ORAL
Qty: 90 TABLET | Refills: 0 | Status: SHIPPED | OUTPATIENT
Start: 2019-07-03 | End: 2019-07-21 | Stop reason: SDUPTHER

## 2019-07-09 RX ORDER — CICLOPIROX 80 MG/ML
SOLUTION TOPICAL
Qty: 6.6 ML | Refills: 0 | OUTPATIENT
Start: 2019-07-09

## 2019-07-21 DIAGNOSIS — K21.9 GASTROESOPHAGEAL REFLUX DISEASE, ESOPHAGITIS PRESENCE NOT SPECIFIED: ICD-10-CM

## 2019-07-22 RX ORDER — PANTOPRAZOLE SODIUM 40 MG/1
TABLET, DELAYED RELEASE ORAL
Qty: 90 TABLET | Refills: 0 | Status: SHIPPED | OUTPATIENT
Start: 2019-07-22 | End: 2019-08-23 | Stop reason: SDUPTHER

## 2019-07-23 DIAGNOSIS — F41.9 ANXIETY AND DEPRESSION: ICD-10-CM

## 2019-07-23 DIAGNOSIS — F32.A ANXIETY AND DEPRESSION: ICD-10-CM

## 2019-07-23 RX ORDER — ESCITALOPRAM OXALATE 20 MG/1
TABLET ORAL
Qty: 30 TABLET | Refills: 0 | Status: SHIPPED | OUTPATIENT
Start: 2019-07-23 | End: 2019-08-23 | Stop reason: SDUPTHER

## 2019-08-16 ENCOUNTER — DOCUMENTATION ONLY (OUTPATIENT)
Dept: FAMILY MEDICINE | Facility: CLINIC | Age: 44
End: 2019-08-16

## 2019-08-16 NOTE — PROGRESS NOTES
Pre-Visit Chart Review  For Appointment Scheduled on 8/23/19    There are no preventive care reminders to display for this patient.

## 2019-08-20 DIAGNOSIS — F32.A ANXIETY AND DEPRESSION: ICD-10-CM

## 2019-08-20 DIAGNOSIS — F41.9 ANXIETY AND DEPRESSION: ICD-10-CM

## 2019-08-23 ENCOUNTER — LAB VISIT (OUTPATIENT)
Dept: LAB | Facility: HOSPITAL | Age: 44
End: 2019-08-23
Attending: FAMILY MEDICINE
Payer: COMMERCIAL

## 2019-08-23 ENCOUNTER — OFFICE VISIT (OUTPATIENT)
Dept: FAMILY MEDICINE | Facility: CLINIC | Age: 44
End: 2019-08-23
Payer: COMMERCIAL

## 2019-08-23 VITALS
TEMPERATURE: 98 F | HEART RATE: 62 BPM | BODY MASS INDEX: 45.99 KG/M2 | SYSTOLIC BLOOD PRESSURE: 134 MMHG | OXYGEN SATURATION: 95 % | DIASTOLIC BLOOD PRESSURE: 74 MMHG | WEIGHT: 293 LBS | HEIGHT: 67 IN

## 2019-08-23 DIAGNOSIS — E03.9 HYPOTHYROIDISM, UNSPECIFIED TYPE: ICD-10-CM

## 2019-08-23 DIAGNOSIS — F32.A ANXIETY AND DEPRESSION: ICD-10-CM

## 2019-08-23 DIAGNOSIS — R73.01 IMPAIRED FASTING BLOOD SUGAR: Primary | ICD-10-CM

## 2019-08-23 DIAGNOSIS — K21.9 GASTROESOPHAGEAL REFLUX DISEASE, ESOPHAGITIS PRESENCE NOT SPECIFIED: ICD-10-CM

## 2019-08-23 DIAGNOSIS — R73.01 IMPAIRED FASTING BLOOD SUGAR: ICD-10-CM

## 2019-08-23 DIAGNOSIS — F41.9 ANXIETY AND DEPRESSION: ICD-10-CM

## 2019-08-23 LAB
ALBUMIN SERPL BCP-MCNC: 3.4 G/DL (ref 3.5–5.2)
ALP SERPL-CCNC: 52 U/L (ref 55–135)
ALT SERPL W/O P-5'-P-CCNC: 19 U/L (ref 10–44)
ANION GAP SERPL CALC-SCNC: 10 MMOL/L (ref 8–16)
AST SERPL-CCNC: 17 U/L (ref 10–40)
BILIRUB SERPL-MCNC: 0.3 MG/DL (ref 0.1–1)
BUN SERPL-MCNC: 12 MG/DL (ref 6–20)
CALCIUM SERPL-MCNC: 9.2 MG/DL (ref 8.7–10.5)
CHLORIDE SERPL-SCNC: 104 MMOL/L (ref 95–110)
CHOLEST SERPL-MCNC: 185 MG/DL (ref 120–199)
CHOLEST/HDLC SERPL: 3 {RATIO} (ref 2–5)
CO2 SERPL-SCNC: 25 MMOL/L (ref 23–29)
CREAT SERPL-MCNC: 0.8 MG/DL (ref 0.5–1.4)
EST. GFR  (AFRICAN AMERICAN): >60 ML/MIN/1.73 M^2
EST. GFR  (NON AFRICAN AMERICAN): >60 ML/MIN/1.73 M^2
ESTIMATED AVG GLUCOSE: 134 MG/DL (ref 68–131)
GLUCOSE SERPL-MCNC: 132 MG/DL (ref 70–110)
HBA1C MFR BLD HPLC: 6.3 % (ref 4–5.6)
HDLC SERPL-MCNC: 62 MG/DL (ref 40–75)
HDLC SERPL: 33.5 % (ref 20–50)
LDLC SERPL CALC-MCNC: 113 MG/DL (ref 63–159)
NONHDLC SERPL-MCNC: 123 MG/DL
POTASSIUM SERPL-SCNC: 4.1 MMOL/L (ref 3.5–5.1)
PROT SERPL-MCNC: 7.2 G/DL (ref 6–8.4)
SODIUM SERPL-SCNC: 139 MMOL/L (ref 136–145)
TRIGL SERPL-MCNC: 50 MG/DL (ref 30–150)
TSH SERPL DL<=0.005 MIU/L-ACNC: 2.83 UIU/ML (ref 0.4–4)

## 2019-08-23 PROCEDURE — 99999 PR PBB SHADOW E&M-EST. PATIENT-LVL IV: CPT | Mod: PBBFAC,,, | Performed by: FAMILY MEDICINE

## 2019-08-23 PROCEDURE — 3078F DIAST BP <80 MM HG: CPT | Mod: CPTII,S$GLB,, | Performed by: FAMILY MEDICINE

## 2019-08-23 PROCEDURE — 3078F PR MOST RECENT DIASTOLIC BLOOD PRESSURE < 80 MM HG: ICD-10-PCS | Mod: CPTII,S$GLB,, | Performed by: FAMILY MEDICINE

## 2019-08-23 PROCEDURE — 99396 PREV VISIT EST AGE 40-64: CPT | Mod: S$GLB,,, | Performed by: FAMILY MEDICINE

## 2019-08-23 PROCEDURE — 83036 HEMOGLOBIN GLYCOSYLATED A1C: CPT

## 2019-08-23 PROCEDURE — 36415 COLL VENOUS BLD VENIPUNCTURE: CPT | Mod: PO

## 2019-08-23 PROCEDURE — 99396 PR PREVENTIVE VISIT,EST,40-64: ICD-10-PCS | Mod: S$GLB,,, | Performed by: FAMILY MEDICINE

## 2019-08-23 PROCEDURE — 3075F SYST BP GE 130 - 139MM HG: CPT | Mod: CPTII,S$GLB,, | Performed by: FAMILY MEDICINE

## 2019-08-23 PROCEDURE — 84443 ASSAY THYROID STIM HORMONE: CPT

## 2019-08-23 PROCEDURE — 80061 LIPID PANEL: CPT

## 2019-08-23 PROCEDURE — 80053 COMPREHEN METABOLIC PANEL: CPT

## 2019-08-23 PROCEDURE — 3075F PR MOST RECENT SYSTOLIC BLOOD PRESS GE 130-139MM HG: ICD-10-PCS | Mod: CPTII,S$GLB,, | Performed by: FAMILY MEDICINE

## 2019-08-23 PROCEDURE — 99999 PR PBB SHADOW E&M-EST. PATIENT-LVL IV: ICD-10-PCS | Mod: PBBFAC,,, | Performed by: FAMILY MEDICINE

## 2019-08-23 RX ORDER — ESCITALOPRAM OXALATE 20 MG/1
TABLET ORAL
Qty: 30 TABLET | Refills: 0 | OUTPATIENT
Start: 2019-08-23

## 2019-08-23 RX ORDER — ESCITALOPRAM OXALATE 20 MG/1
20 TABLET ORAL DAILY
Qty: 90 TABLET | Refills: 3 | Status: SHIPPED | OUTPATIENT
Start: 2019-08-23 | End: 2020-08-23 | Stop reason: SDUPTHER

## 2019-08-23 RX ORDER — LEVOTHYROXINE SODIUM 50 UG/1
TABLET ORAL
Qty: 90 TABLET | Refills: 3 | Status: SHIPPED | OUTPATIENT
Start: 2019-08-23 | End: 2020-08-24

## 2019-08-23 RX ORDER — PANTOPRAZOLE SODIUM 40 MG/1
40 TABLET, DELAYED RELEASE ORAL DAILY
Qty: 90 TABLET | Refills: 3 | Status: SHIPPED | OUTPATIENT
Start: 2019-08-23 | End: 2020-08-24

## 2019-08-23 NOTE — PROGRESS NOTES
Subjective:   Patient ID: Lavinia Herrera is a 44 y.o. female     Chief Complaint:Establish Care      Patient here for checkup and to establish care.  Patient overall doing well.    Review of Systems   Constitutional: Negative for activity change and unexpected weight change.   HENT: Negative for hearing loss, rhinorrhea and trouble swallowing.    Eyes: Negative for discharge and visual disturbance.   Respiratory: Negative for chest tightness and wheezing.    Cardiovascular: Negative for chest pain and palpitations.   Gastrointestinal: Negative for blood in stool, constipation, diarrhea and vomiting.   Endocrine: Negative for polydipsia and polyuria.   Genitourinary: Negative for difficulty urinating, dysuria, hematuria and menstrual problem.   Musculoskeletal: Negative for arthralgias, joint swelling and neck pain.   Neurological: Negative for weakness and headaches.   Psychiatric/Behavioral: Negative for confusion and dysphoric mood.     Past Medical History:   Diagnosis Date    Allergy     Morbid obesity     Otitis media     Seasonal allergies      Objective:     Vitals:    08/23/19 0755   BP: 134/74   Pulse: 62   Temp: 98 °F (36.7 °C)     Body mass index is 67.26 kg/m².  Physical Exam   Constitutional: No distress.   HENT:   Head: Normocephalic and atraumatic.   Eyes: EOM are normal.   Cardiovascular: Normal rate and normal heart sounds.   Pulmonary/Chest: Effort normal.   Abdominal: Bowel sounds are normal.   Musculoskeletal: Normal range of motion.   Neurological: She is alert.   Psychiatric: She has a normal mood and affect.     Assessment:     1. Impaired fasting blood sugar    2. Anxiety and depression    3. Gastroesophageal reflux disease, esophagitis presence not specified    4. Hypothyroidism, unspecified type      Plan:   Impaired fasting blood sugar  -     Lipid panel; Future; Expected date: 08/23/2019  -     Hemoglobin A1c; Future; Expected date: 08/23/2019  -     Comprehensive metabolic  panel; Future; Expected date: 08/23/2019  -     Ambulatory referral to Nutrition Services    Anxiety and depression  -     escitalopram oxalate (LEXAPRO) 20 MG tablet; Take 1 tablet (20 mg total) by mouth once daily. TAKE 1 TABLET(20 MG) BY MOUTH EVERY DAY  Dispense: 90 tablet; Refill: 3    Gastroesophageal reflux disease, esophagitis presence not specified  -     pantoprazole (PROTONIX) 40 MG tablet; Take 1 tablet (40 mg total) by mouth once daily.  Dispense: 90 tablet; Refill: 3    Hypothyroidism, unspecified type  -     levothyroxine (SYNTHROID) 50 MCG tablet; TAKE 1 TABLET(50 MCG) BY MOUTH EVERY DAY  Dispense: 90 tablet; Refill: 3  -     TSH; Future; Expected date: 08/23/2019        Oniel Malik MD  08/23/2019    Portions of this note have been dictated with LINDA Robles

## 2019-10-21 DIAGNOSIS — M79.602 LEFT ARM PAIN: Primary | ICD-10-CM

## 2020-04-16 ENCOUNTER — PATIENT MESSAGE (OUTPATIENT)
Dept: FAMILY MEDICINE | Facility: CLINIC | Age: 45
End: 2020-04-16

## 2020-08-23 DIAGNOSIS — K21.9 GASTROESOPHAGEAL REFLUX DISEASE, ESOPHAGITIS PRESENCE NOT SPECIFIED: ICD-10-CM

## 2020-08-23 DIAGNOSIS — E03.9 HYPOTHYROIDISM, UNSPECIFIED TYPE: ICD-10-CM

## 2020-08-23 DIAGNOSIS — F41.9 ANXIETY AND DEPRESSION: ICD-10-CM

## 2020-08-23 DIAGNOSIS — F32.A ANXIETY AND DEPRESSION: ICD-10-CM

## 2020-08-24 RX ORDER — PANTOPRAZOLE SODIUM 40 MG/1
TABLET, DELAYED RELEASE ORAL
Qty: 90 TABLET | Refills: 1 | Status: SHIPPED | OUTPATIENT
Start: 2020-08-24 | End: 2021-04-29 | Stop reason: SDUPTHER

## 2020-08-24 RX ORDER — ESCITALOPRAM OXALATE 20 MG/1
20 TABLET ORAL DAILY
Qty: 90 TABLET | Refills: 3 | Status: SHIPPED | OUTPATIENT
Start: 2020-08-24 | End: 2021-06-24

## 2020-08-24 RX ORDER — LEVOTHYROXINE SODIUM 50 UG/1
TABLET ORAL
Qty: 90 TABLET | Refills: 1 | Status: SHIPPED | OUTPATIENT
Start: 2020-08-24 | End: 2021-03-12

## 2020-08-28 DIAGNOSIS — Z12.39 BREAST CANCER SCREENING: ICD-10-CM

## 2021-01-04 ENCOUNTER — PATIENT MESSAGE (OUTPATIENT)
Dept: ADMINISTRATIVE | Facility: HOSPITAL | Age: 46
End: 2021-01-04

## 2021-03-12 ENCOUNTER — OFFICE VISIT (OUTPATIENT)
Dept: FAMILY MEDICINE | Facility: CLINIC | Age: 46
End: 2021-03-12
Payer: COMMERCIAL

## 2021-03-12 ENCOUNTER — HOSPITAL ENCOUNTER (OUTPATIENT)
Dept: RADIOLOGY | Facility: CLINIC | Age: 46
Discharge: HOME | End: 2021-03-12
Attending: FAMILY MEDICINE
Payer: COMMERCIAL

## 2021-03-12 VITALS
WEIGHT: 293 LBS | TEMPERATURE: 98 F | HEIGHT: 67 IN | OXYGEN SATURATION: 97 % | BODY MASS INDEX: 45.99 KG/M2 | DIASTOLIC BLOOD PRESSURE: 82 MMHG | SYSTOLIC BLOOD PRESSURE: 130 MMHG | HEART RATE: 78 BPM

## 2021-03-12 DIAGNOSIS — Z12.39 BREAST CANCER SCREENING: ICD-10-CM

## 2021-03-12 DIAGNOSIS — Z00.00 ANNUAL PHYSICAL EXAM: Primary | ICD-10-CM

## 2021-03-12 DIAGNOSIS — Z30.9 ENCOUNTER FOR CONTRACEPTIVE MANAGEMENT, UNSPECIFIED TYPE: ICD-10-CM

## 2021-03-12 PROCEDURE — 77067 SCR MAMMO BI INCL CAD: CPT | Mod: TC,PO

## 2021-03-12 PROCEDURE — 77067 SCR MAMMO BI INCL CAD: CPT | Mod: 26,,, | Performed by: RADIOLOGY

## 2021-03-12 PROCEDURE — 99396 PR PREVENTIVE VISIT,EST,40-64: ICD-10-PCS | Mod: S$GLB,,, | Performed by: PHYSICIAN ASSISTANT

## 2021-03-12 PROCEDURE — 99999 PR PBB SHADOW E&M-EST. PATIENT-LVL III: ICD-10-PCS | Mod: PBBFAC,,, | Performed by: PHYSICIAN ASSISTANT

## 2021-03-12 PROCEDURE — 99396 PREV VISIT EST AGE 40-64: CPT | Mod: S$GLB,,, | Performed by: PHYSICIAN ASSISTANT

## 2021-03-12 PROCEDURE — 99999 PR PBB SHADOW E&M-EST. PATIENT-LVL III: CPT | Mod: PBBFAC,,, | Performed by: PHYSICIAN ASSISTANT

## 2021-03-12 PROCEDURE — 77067 MAMMO DIGITAL SCREENING BILAT WITH TOMOSYNTHESIS_CAD: ICD-10-PCS | Mod: 26,,, | Performed by: RADIOLOGY

## 2021-03-12 PROCEDURE — 77063 MAMMO DIGITAL SCREENING BILAT WITH TOMOSYNTHESIS_CAD: ICD-10-PCS | Mod: 26,,, | Performed by: RADIOLOGY

## 2021-03-12 PROCEDURE — 77063 BREAST TOMOSYNTHESIS BI: CPT | Mod: 26,,, | Performed by: RADIOLOGY

## 2021-03-16 ENCOUNTER — LAB VISIT (OUTPATIENT)
Dept: LAB | Facility: HOSPITAL | Age: 46
End: 2021-03-16
Attending: PHYSICIAN ASSISTANT
Payer: COMMERCIAL

## 2021-03-16 DIAGNOSIS — Z00.00 ANNUAL PHYSICAL EXAM: ICD-10-CM

## 2021-03-16 LAB
ALBUMIN SERPL BCP-MCNC: 3.5 G/DL (ref 3.5–5.2)
ALP SERPL-CCNC: 56 U/L (ref 55–135)
ALT SERPL W/O P-5'-P-CCNC: 28 U/L (ref 10–44)
ANION GAP SERPL CALC-SCNC: 10 MMOL/L (ref 8–16)
AST SERPL-CCNC: 21 U/L (ref 10–40)
BASOPHILS # BLD AUTO: 0.04 K/UL (ref 0–0.2)
BASOPHILS NFR BLD: 0.5 % (ref 0–1.9)
BILIRUB SERPL-MCNC: 0.4 MG/DL (ref 0.1–1)
BUN SERPL-MCNC: 13 MG/DL (ref 6–20)
CALCIUM SERPL-MCNC: 8.9 MG/DL (ref 8.7–10.5)
CHLORIDE SERPL-SCNC: 103 MMOL/L (ref 95–110)
CHOLEST SERPL-MCNC: 171 MG/DL (ref 120–199)
CHOLEST/HDLC SERPL: 3.4 {RATIO} (ref 2–5)
CO2 SERPL-SCNC: 28 MMOL/L (ref 23–29)
CREAT SERPL-MCNC: 0.8 MG/DL (ref 0.5–1.4)
DIFFERENTIAL METHOD: ABNORMAL
EOSINOPHIL # BLD AUTO: 0.2 K/UL (ref 0–0.5)
EOSINOPHIL NFR BLD: 1.9 % (ref 0–8)
ERYTHROCYTE [DISTWIDTH] IN BLOOD BY AUTOMATED COUNT: 13.8 % (ref 11.5–14.5)
EST. GFR  (AFRICAN AMERICAN): >60 ML/MIN/1.73 M^2
EST. GFR  (NON AFRICAN AMERICAN): >60 ML/MIN/1.73 M^2
ESTIMATED AVG GLUCOSE: 151 MG/DL (ref 68–131)
GLUCOSE SERPL-MCNC: 140 MG/DL (ref 70–110)
HBA1C MFR BLD: 6.9 % (ref 4–5.6)
HCT VFR BLD AUTO: 36.9 % (ref 37–48.5)
HDLC SERPL-MCNC: 50 MG/DL (ref 40–75)
HDLC SERPL: 29.2 % (ref 20–50)
HGB BLD-MCNC: 11.3 G/DL (ref 12–16)
IMM GRANULOCYTES # BLD AUTO: 0.03 K/UL (ref 0–0.04)
IMM GRANULOCYTES NFR BLD AUTO: 0.4 % (ref 0–0.5)
LDLC SERPL CALC-MCNC: 107.6 MG/DL (ref 63–159)
LYMPHOCYTES # BLD AUTO: 1.7 K/UL (ref 1–4.8)
LYMPHOCYTES NFR BLD: 21.5 % (ref 18–48)
MCH RBC QN AUTO: 29.4 PG (ref 27–31)
MCHC RBC AUTO-ENTMCNC: 30.6 G/DL (ref 32–36)
MCV RBC AUTO: 96 FL (ref 82–98)
MONOCYTES # BLD AUTO: 0.7 K/UL (ref 0.3–1)
MONOCYTES NFR BLD: 9.2 % (ref 4–15)
NEUTROPHILS # BLD AUTO: 5.2 K/UL (ref 1.8–7.7)
NEUTROPHILS NFR BLD: 66.5 % (ref 38–73)
NONHDLC SERPL-MCNC: 121 MG/DL
NRBC BLD-RTO: 0 /100 WBC
PLATELET # BLD AUTO: 259 K/UL (ref 150–350)
PMV BLD AUTO: 11.3 FL (ref 9.2–12.9)
POTASSIUM SERPL-SCNC: 3.8 MMOL/L (ref 3.5–5.1)
PROT SERPL-MCNC: 7.3 G/DL (ref 6–8.4)
RBC # BLD AUTO: 3.84 M/UL (ref 4–5.4)
SODIUM SERPL-SCNC: 141 MMOL/L (ref 136–145)
T3 SERPL-MCNC: 96 NG/DL (ref 60–180)
T4 FREE SERPL-MCNC: 0.78 NG/DL (ref 0.71–1.51)
TRIGL SERPL-MCNC: 67 MG/DL (ref 30–150)
TSH SERPL DL<=0.005 MIU/L-ACNC: 5.57 UIU/ML (ref 0.4–4)
WBC # BLD AUTO: 7.75 K/UL (ref 3.9–12.7)

## 2021-03-16 PROCEDURE — 86803 HEPATITIS C AB TEST: CPT | Performed by: PHYSICIAN ASSISTANT

## 2021-03-16 PROCEDURE — 85025 COMPLETE CBC W/AUTO DIFF WBC: CPT | Performed by: PHYSICIAN ASSISTANT

## 2021-03-16 PROCEDURE — 84480 ASSAY TRIIODOTHYRONINE (T3): CPT | Performed by: PHYSICIAN ASSISTANT

## 2021-03-16 PROCEDURE — 83036 HEMOGLOBIN GLYCOSYLATED A1C: CPT | Performed by: PHYSICIAN ASSISTANT

## 2021-03-16 PROCEDURE — 84443 ASSAY THYROID STIM HORMONE: CPT | Performed by: PHYSICIAN ASSISTANT

## 2021-03-16 PROCEDURE — 86703 HIV-1/HIV-2 1 RESULT ANTBDY: CPT | Performed by: PHYSICIAN ASSISTANT

## 2021-03-16 PROCEDURE — 84439 ASSAY OF FREE THYROXINE: CPT | Performed by: PHYSICIAN ASSISTANT

## 2021-03-16 PROCEDURE — 80061 LIPID PANEL: CPT | Performed by: PHYSICIAN ASSISTANT

## 2021-03-16 PROCEDURE — 36415 COLL VENOUS BLD VENIPUNCTURE: CPT | Mod: PO | Performed by: PHYSICIAN ASSISTANT

## 2021-03-16 PROCEDURE — 80053 COMPREHEN METABOLIC PANEL: CPT | Performed by: PHYSICIAN ASSISTANT

## 2021-03-17 LAB
HCV AB SERPL QL IA: NEGATIVE
HIV 1+2 AB+HIV1 P24 AG SERPL QL IA: NEGATIVE

## 2021-03-18 DIAGNOSIS — R80.9 PROTEINURIA, UNSPECIFIED TYPE: Primary | ICD-10-CM

## 2021-03-22 ENCOUNTER — LAB VISIT (OUTPATIENT)
Dept: LAB | Facility: HOSPITAL | Age: 46
End: 2021-03-22
Attending: PHYSICIAN ASSISTANT
Payer: COMMERCIAL

## 2021-03-22 DIAGNOSIS — R80.9 PROTEINURIA, UNSPECIFIED TYPE: ICD-10-CM

## 2021-03-22 LAB
BACTERIA #/AREA URNS AUTO: ABNORMAL /HPF
BILIRUB UR QL STRIP: NEGATIVE
CLARITY UR REFRACT.AUTO: ABNORMAL
COLOR UR AUTO: YELLOW
GLUCOSE UR QL STRIP: NEGATIVE
HGB UR QL STRIP: ABNORMAL
HYALINE CASTS UR QL AUTO: 0 /LPF
KETONES UR QL STRIP: NEGATIVE
LEUKOCYTE ESTERASE UR QL STRIP: ABNORMAL
MICROSCOPIC COMMENT: ABNORMAL
NITRITE UR QL STRIP: NEGATIVE
PH UR STRIP: 6 [PH] (ref 5–8)
PROT UR QL STRIP: ABNORMAL
RBC #/AREA URNS AUTO: 33 /HPF (ref 0–4)
SP GR UR STRIP: 1.02 (ref 1–1.03)
SQUAMOUS #/AREA URNS AUTO: 18 /HPF
URN SPEC COLLECT METH UR: ABNORMAL
WBC #/AREA URNS AUTO: 9 /HPF (ref 0–5)
YEAST UR QL AUTO: ABNORMAL

## 2021-03-22 PROCEDURE — 81001 URINALYSIS AUTO W/SCOPE: CPT | Performed by: PHYSICIAN ASSISTANT

## 2021-03-23 ENCOUNTER — PATIENT OUTREACH (OUTPATIENT)
Dept: ADMINISTRATIVE | Facility: OTHER | Age: 46
End: 2021-03-23

## 2021-03-23 ENCOUNTER — PATIENT MESSAGE (OUTPATIENT)
Dept: FAMILY MEDICINE | Facility: CLINIC | Age: 46
End: 2021-03-23

## 2021-03-23 DIAGNOSIS — R82.90 ABNORMAL URINE FINDING: Primary | ICD-10-CM

## 2021-03-25 ENCOUNTER — OFFICE VISIT (OUTPATIENT)
Dept: OBSTETRICS AND GYNECOLOGY | Facility: CLINIC | Age: 46
End: 2021-03-25
Payer: COMMERCIAL

## 2021-03-25 VITALS
SYSTOLIC BLOOD PRESSURE: 115 MMHG | WEIGHT: 293 LBS | BODY MASS INDEX: 45.99 KG/M2 | HEIGHT: 67 IN | DIASTOLIC BLOOD PRESSURE: 64 MMHG

## 2021-03-25 DIAGNOSIS — Z97.5 CONTRACEPTION, DEVICE INTRAUTERINE: ICD-10-CM

## 2021-03-25 DIAGNOSIS — Z12.4 ENCOUNTER FOR PAP SMEAR OF CERVIX WITH HPV DNA COTESTING: Primary | ICD-10-CM

## 2021-03-25 DIAGNOSIS — Z00.00 PREVENTATIVE HEALTH CARE: ICD-10-CM

## 2021-03-25 DIAGNOSIS — Z30.9 ENCOUNTER FOR CONTRACEPTIVE MANAGEMENT, UNSPECIFIED TYPE: ICD-10-CM

## 2021-03-25 DIAGNOSIS — Z00.00 ANNUAL PHYSICAL EXAM: ICD-10-CM

## 2021-03-25 PROCEDURE — 99999 PR PBB SHADOW E&M-EST. PATIENT-LVL III: CPT | Mod: PBBFAC,,, | Performed by: SPECIALIST

## 2021-03-25 PROCEDURE — 99396 PREV VISIT EST AGE 40-64: CPT | Mod: S$GLB,,, | Performed by: SPECIALIST

## 2021-03-25 PROCEDURE — 88175 CYTOPATH C/V AUTO FLUID REDO: CPT | Performed by: SPECIALIST

## 2021-03-25 PROCEDURE — 99999 PR PBB SHADOW E&M-EST. PATIENT-LVL III: ICD-10-PCS | Mod: PBBFAC,,, | Performed by: SPECIALIST

## 2021-03-25 PROCEDURE — 99396 PR PREVENTIVE VISIT,EST,40-64: ICD-10-PCS | Mod: S$GLB,,, | Performed by: SPECIALIST

## 2021-03-25 PROCEDURE — 87624 HPV HI-RISK TYP POOLED RSLT: CPT | Performed by: SPECIALIST

## 2021-03-29 ENCOUNTER — LAB VISIT (OUTPATIENT)
Dept: LAB | Facility: HOSPITAL | Age: 46
End: 2021-03-29
Attending: PHYSICIAN ASSISTANT
Payer: COMMERCIAL

## 2021-03-29 ENCOUNTER — OFFICE VISIT (OUTPATIENT)
Dept: FAMILY MEDICINE | Facility: CLINIC | Age: 46
End: 2021-03-29
Payer: COMMERCIAL

## 2021-03-29 VITALS
DIASTOLIC BLOOD PRESSURE: 60 MMHG | SYSTOLIC BLOOD PRESSURE: 118 MMHG | TEMPERATURE: 99 F | HEIGHT: 67 IN | HEART RATE: 87 BPM | WEIGHT: 293 LBS | OXYGEN SATURATION: 95 % | BODY MASS INDEX: 45.99 KG/M2

## 2021-03-29 DIAGNOSIS — E11.69 TYPE 2 DIABETES MELLITUS WITH OTHER SPECIFIED COMPLICATION, WITHOUT LONG-TERM CURRENT USE OF INSULIN: Primary | ICD-10-CM

## 2021-03-29 DIAGNOSIS — D64.9 NORMOCYTIC ANEMIA: ICD-10-CM

## 2021-03-29 DIAGNOSIS — E03.9 HYPOTHYROIDISM, UNSPECIFIED TYPE: ICD-10-CM

## 2021-03-29 DIAGNOSIS — E66.01 MORBID OBESITY WITH BMI OF 60.0-69.9, ADULT: ICD-10-CM

## 2021-03-29 PROCEDURE — 99214 OFFICE O/P EST MOD 30 MIN: CPT | Mod: S$GLB,,, | Performed by: PHYSICIAN ASSISTANT

## 2021-03-29 PROCEDURE — 82746 ASSAY OF FOLIC ACID SERUM: CPT | Performed by: PHYSICIAN ASSISTANT

## 2021-03-29 PROCEDURE — 36415 COLL VENOUS BLD VENIPUNCTURE: CPT | Mod: PO | Performed by: PHYSICIAN ASSISTANT

## 2021-03-29 PROCEDURE — 99214 PR OFFICE/OUTPT VISIT, EST, LEVL IV, 30-39 MIN: ICD-10-PCS | Mod: S$GLB,,, | Performed by: PHYSICIAN ASSISTANT

## 2021-03-29 PROCEDURE — 85045 AUTOMATED RETICULOCYTE COUNT: CPT | Performed by: PHYSICIAN ASSISTANT

## 2021-03-29 PROCEDURE — 82607 VITAMIN B-12: CPT | Performed by: PHYSICIAN ASSISTANT

## 2021-03-29 PROCEDURE — 82728 ASSAY OF FERRITIN: CPT | Performed by: PHYSICIAN ASSISTANT

## 2021-03-29 PROCEDURE — 83540 ASSAY OF IRON: CPT | Performed by: PHYSICIAN ASSISTANT

## 2021-03-29 PROCEDURE — 99999 PR PBB SHADOW E&M-EST. PATIENT-LVL IV: CPT | Mod: PBBFAC,,, | Performed by: PHYSICIAN ASSISTANT

## 2021-03-29 PROCEDURE — 99999 PR PBB SHADOW E&M-EST. PATIENT-LVL IV: ICD-10-PCS | Mod: PBBFAC,,, | Performed by: PHYSICIAN ASSISTANT

## 2021-03-29 PROCEDURE — 85025 COMPLETE CBC W/AUTO DIFF WBC: CPT | Performed by: PHYSICIAN ASSISTANT

## 2021-03-29 RX ORDER — AMOXICILLIN 500 MG/1
500 TABLET, FILM COATED ORAL 3 TIMES DAILY
COMMUNITY
Start: 2021-03-24 | End: 2021-07-01

## 2021-03-29 RX ORDER — LIRAGLUTIDE 6 MG/ML
INJECTION SUBCUTANEOUS
Qty: 3 ML | Refills: 2 | Status: SHIPPED | OUTPATIENT
Start: 2021-03-29 | End: 2021-03-29

## 2021-03-29 RX ORDER — IBUPROFEN 800 MG/1
800 TABLET ORAL EVERY 6 HOURS PRN
COMMUNITY
Start: 2021-03-24 | End: 2023-03-02

## 2021-03-29 RX ORDER — LIRAGLUTIDE 6 MG/ML
INJECTION SUBCUTANEOUS
Qty: 3 ML | Refills: 2 | Status: SHIPPED | OUTPATIENT
Start: 2021-03-29 | End: 2021-03-31

## 2021-03-29 RX ORDER — HYDROCODONE BITARTRATE AND ACETAMINOPHEN 5; 325 MG/1; MG/1
1 TABLET ORAL
COMMUNITY
Start: 2021-03-24 | End: 2021-10-12

## 2021-03-29 RX ORDER — LEVOTHYROXINE SODIUM 50 UG/1
50 TABLET ORAL
Qty: 30 TABLET | Refills: 2 | Status: SHIPPED | OUTPATIENT
Start: 2021-03-29 | End: 2021-07-01 | Stop reason: SDUPTHER

## 2021-03-30 ENCOUNTER — PATIENT MESSAGE (OUTPATIENT)
Dept: FAMILY MEDICINE | Facility: CLINIC | Age: 46
End: 2021-03-30

## 2021-03-30 LAB
BASOPHILS # BLD AUTO: 0.04 K/UL (ref 0–0.2)
BASOPHILS NFR BLD: 0.4 % (ref 0–1.9)
DIFFERENTIAL METHOD: ABNORMAL
EOSINOPHIL # BLD AUTO: 0.2 K/UL (ref 0–0.5)
EOSINOPHIL NFR BLD: 1.6 % (ref 0–8)
ERYTHROCYTE [DISTWIDTH] IN BLOOD BY AUTOMATED COUNT: 13.5 % (ref 11.5–14.5)
FERRITIN SERPL-MCNC: 114 NG/ML (ref 20–300)
FOLATE SERPL-MCNC: 5 NG/ML (ref 4–24)
HCT VFR BLD AUTO: 41.2 % (ref 37–48.5)
HGB BLD-MCNC: 12.6 G/DL (ref 12–16)
IMM GRANULOCYTES # BLD AUTO: 0.03 K/UL (ref 0–0.04)
IMM GRANULOCYTES NFR BLD AUTO: 0.3 % (ref 0–0.5)
IRON SERPL-MCNC: 55 UG/DL (ref 30–160)
LYMPHOCYTES # BLD AUTO: 2.2 K/UL (ref 1–4.8)
LYMPHOCYTES NFR BLD: 21 % (ref 18–48)
MCH RBC QN AUTO: 29 PG (ref 27–31)
MCHC RBC AUTO-ENTMCNC: 30.6 G/DL (ref 32–36)
MCV RBC AUTO: 95 FL (ref 82–98)
MONOCYTES # BLD AUTO: 0.9 K/UL (ref 0.3–1)
MONOCYTES NFR BLD: 8.8 % (ref 4–15)
NEUTROPHILS # BLD AUTO: 7 K/UL (ref 1.8–7.7)
NEUTROPHILS NFR BLD: 67.9 % (ref 38–73)
NRBC BLD-RTO: 0 /100 WBC
PLATELET # BLD AUTO: 277 K/UL (ref 150–450)
PMV BLD AUTO: 11.5 FL (ref 9.2–12.9)
RBC # BLD AUTO: 4.34 M/UL (ref 4–5.4)
RETICS/RBC NFR AUTO: 2.6 % (ref 0.5–2.5)
SATURATED IRON: 15 % (ref 20–50)
TOTAL IRON BINDING CAPACITY: 364 UG/DL (ref 250–450)
TRANSFERRIN SERPL-MCNC: 246 MG/DL (ref 200–375)
VIT B12 SERPL-MCNC: 574 PG/ML (ref 210–950)
WBC # BLD AUTO: 10.28 K/UL (ref 3.9–12.7)

## 2021-03-31 ENCOUNTER — TELEPHONE (OUTPATIENT)
Dept: FAMILY MEDICINE | Facility: CLINIC | Age: 46
End: 2021-03-31

## 2021-03-31 ENCOUNTER — PATIENT MESSAGE (OUTPATIENT)
Dept: FAMILY MEDICINE | Facility: CLINIC | Age: 46
End: 2021-03-31

## 2021-03-31 DIAGNOSIS — E11.69 TYPE 2 DIABETES MELLITUS WITH OTHER SPECIFIED COMPLICATION, WITHOUT LONG-TERM CURRENT USE OF INSULIN: ICD-10-CM

## 2021-03-31 RX ORDER — LIRAGLUTIDE 6 MG/ML
INJECTION SUBCUTANEOUS
Qty: 6 ML | Refills: 2 | Status: SHIPPED | OUTPATIENT
Start: 2021-03-31 | End: 2021-04-01

## 2021-04-01 ENCOUNTER — PATIENT MESSAGE (OUTPATIENT)
Dept: FAMILY MEDICINE | Facility: CLINIC | Age: 46
End: 2021-04-01

## 2021-04-01 LAB
CLINICAL INFO: NORMAL
CYTO CVX: NORMAL
CYTOLOGIST CVX/VAG CYTO: NORMAL
CYTOLOGY CMNT CVX/VAG CYTO-IMP: NORMAL
CYTOLOGY PAP THIN PREP EXPLANATION: NORMAL
DATE OF PREVIOUS PAP: NORMAL
DATE PREVIOUS BX: NO
HPV I/H RISK 4 DNA CVX QL NAA+PROBE: NOT DETECTED
LMP START DATE: NORMAL
SPECIMEN SOURCE CVX/VAG CYTO: NORMAL
STAT OF ADQ CVX/VAG CYTO-IMP: NORMAL

## 2021-04-01 RX ORDER — GLIMEPIRIDE 2 MG/1
2 TABLET ORAL
Qty: 90 TABLET | Refills: 3 | Status: SHIPPED | OUTPATIENT
Start: 2021-04-01 | End: 2021-07-01

## 2021-04-11 ENCOUNTER — PATIENT MESSAGE (OUTPATIENT)
Dept: FAMILY MEDICINE | Facility: CLINIC | Age: 46
End: 2021-04-11

## 2021-04-12 ENCOUNTER — PATIENT MESSAGE (OUTPATIENT)
Dept: FAMILY MEDICINE | Facility: CLINIC | Age: 46
End: 2021-04-12

## 2021-04-12 DIAGNOSIS — E66.01 MORBID OBESITY WITH BMI OF 60.0-69.9, ADULT: ICD-10-CM

## 2021-04-12 DIAGNOSIS — E03.9 HYPOTHYROIDISM, UNSPECIFIED TYPE: ICD-10-CM

## 2021-04-12 DIAGNOSIS — E11.69 TYPE 2 DIABETES MELLITUS WITH OTHER SPECIFIED COMPLICATION, WITHOUT LONG-TERM CURRENT USE OF INSULIN: Primary | ICD-10-CM

## 2021-04-26 NOTE — TELEPHONE ENCOUNTER
----- Message from Niki Lu sent at 6/1/2018 12:09 PM CDT -----  Type:  Patient Returning Call    Who Called:  Patient   Who Left Message for Patient:  Rosa Maria   Does the patient know what this is regarding?:  results  Best Call Back Number:  [phone]  Additional Information:      
Lab results discussed with patient  
General Sunscreen Counseling: I recommended a broad spectrum sunscreen with a SPF of 30 or higher.  I explained that SPF 30 sunscreens block approximately 97 percent of the sun's harmful rays.  Sunscreens should be applied at least 15 minutes prior to expected sun exposure and then every 2 hours after that as long as sun exposure continues. If swimming or exercising sunscreen should be reapplied every 45 minutes to an hour after getting wet or sweating.  One ounce, or the equivalent of a shot glass full of sunscreen, is adequate to protect the skin not covered by a bathing suit. I also recommended a lip balm with a sunscreen as well. Sun protective clothing can be used in lieu of sunscreen but must be worn the entire time you are exposed to the sun's rays.
Detail Level: Generalized

## 2021-04-29 ENCOUNTER — PATIENT MESSAGE (OUTPATIENT)
Dept: FAMILY MEDICINE | Facility: CLINIC | Age: 46
End: 2021-04-29

## 2021-04-29 DIAGNOSIS — K21.9 GASTROESOPHAGEAL REFLUX DISEASE, UNSPECIFIED WHETHER ESOPHAGITIS PRESENT: ICD-10-CM

## 2021-04-29 RX ORDER — PANTOPRAZOLE SODIUM 40 MG/1
40 TABLET, DELAYED RELEASE ORAL DAILY
Qty: 90 TABLET | Refills: 1 | Status: SHIPPED | OUTPATIENT
Start: 2021-04-29 | End: 2021-10-28

## 2021-06-23 ENCOUNTER — PATIENT MESSAGE (OUTPATIENT)
Dept: FAMILY MEDICINE | Facility: CLINIC | Age: 46
End: 2021-06-23

## 2021-07-01 ENCOUNTER — LAB VISIT (OUTPATIENT)
Dept: LAB | Facility: HOSPITAL | Age: 46
End: 2021-07-01
Attending: FAMILY MEDICINE
Payer: COMMERCIAL

## 2021-07-01 ENCOUNTER — OFFICE VISIT (OUTPATIENT)
Dept: FAMILY MEDICINE | Facility: CLINIC | Age: 46
End: 2021-07-01
Payer: COMMERCIAL

## 2021-07-01 VITALS
HEIGHT: 67 IN | SYSTOLIC BLOOD PRESSURE: 122 MMHG | DIASTOLIC BLOOD PRESSURE: 76 MMHG | WEIGHT: 293 LBS | BODY MASS INDEX: 45.99 KG/M2 | HEART RATE: 75 BPM | OXYGEN SATURATION: 95 %

## 2021-07-01 DIAGNOSIS — E88.819 INSULIN RESISTANCE: ICD-10-CM

## 2021-07-01 DIAGNOSIS — E88.819 INSULIN RESISTANCE: Primary | ICD-10-CM

## 2021-07-01 DIAGNOSIS — E03.9 HYPOTHYROIDISM, UNSPECIFIED TYPE: ICD-10-CM

## 2021-07-01 PROCEDURE — 99999 PR PBB SHADOW E&M-EST. PATIENT-LVL III: CPT | Mod: PBBFAC,,, | Performed by: FAMILY MEDICINE

## 2021-07-01 PROCEDURE — 3008F PR BODY MASS INDEX (BMI) DOCUMENTED: ICD-10-PCS | Mod: CPTII,S$GLB,, | Performed by: FAMILY MEDICINE

## 2021-07-01 PROCEDURE — 99999 PR PBB SHADOW E&M-EST. PATIENT-LVL III: ICD-10-PCS | Mod: PBBFAC,,, | Performed by: FAMILY MEDICINE

## 2021-07-01 PROCEDURE — 36415 COLL VENOUS BLD VENIPUNCTURE: CPT | Mod: PO | Performed by: FAMILY MEDICINE

## 2021-07-01 PROCEDURE — 80053 COMPREHEN METABOLIC PANEL: CPT | Performed by: FAMILY MEDICINE

## 2021-07-01 PROCEDURE — 83036 HEMOGLOBIN GLYCOSYLATED A1C: CPT | Performed by: FAMILY MEDICINE

## 2021-07-01 PROCEDURE — 1125F AMNT PAIN NOTED PAIN PRSNT: CPT | Mod: S$GLB,,, | Performed by: FAMILY MEDICINE

## 2021-07-01 PROCEDURE — 84443 ASSAY THYROID STIM HORMONE: CPT | Performed by: FAMILY MEDICINE

## 2021-07-01 PROCEDURE — 99396 PR PREVENTIVE VISIT,EST,40-64: ICD-10-PCS | Mod: S$GLB,,, | Performed by: FAMILY MEDICINE

## 2021-07-01 PROCEDURE — 99396 PREV VISIT EST AGE 40-64: CPT | Mod: S$GLB,,, | Performed by: FAMILY MEDICINE

## 2021-07-01 PROCEDURE — 1125F PR PAIN SEVERITY QUANTIFIED, PAIN PRESENT: ICD-10-PCS | Mod: S$GLB,,, | Performed by: FAMILY MEDICINE

## 2021-07-01 PROCEDURE — 3008F BODY MASS INDEX DOCD: CPT | Mod: CPTII,S$GLB,, | Performed by: FAMILY MEDICINE

## 2021-07-01 RX ORDER — LEVOTHYROXINE SODIUM 50 UG/1
50 TABLET ORAL
Qty: 90 TABLET | Refills: 3 | Status: SHIPPED | OUTPATIENT
Start: 2021-07-01 | End: 2021-08-03

## 2021-07-01 RX ORDER — ASCORBIC ACID 500 MG
500 TABLET ORAL DAILY
COMMUNITY
End: 2023-03-02

## 2021-07-02 ENCOUNTER — PATIENT MESSAGE (OUTPATIENT)
Dept: FAMILY MEDICINE | Facility: CLINIC | Age: 46
End: 2021-07-02

## 2021-07-02 LAB
ALBUMIN SERPL BCP-MCNC: 3.7 G/DL (ref 3.5–5.2)
ALP SERPL-CCNC: 62 U/L (ref 55–135)
ALT SERPL W/O P-5'-P-CCNC: 18 U/L (ref 10–44)
ANION GAP SERPL CALC-SCNC: 11 MMOL/L (ref 8–16)
AST SERPL-CCNC: 17 U/L (ref 10–40)
BILIRUB SERPL-MCNC: 0.3 MG/DL (ref 0.1–1)
BUN SERPL-MCNC: 15 MG/DL (ref 6–20)
CALCIUM SERPL-MCNC: 9.4 MG/DL (ref 8.7–10.5)
CHLORIDE SERPL-SCNC: 103 MMOL/L (ref 95–110)
CO2 SERPL-SCNC: 25 MMOL/L (ref 23–29)
CREAT SERPL-MCNC: 0.9 MG/DL (ref 0.5–1.4)
EST. GFR  (AFRICAN AMERICAN): >60 ML/MIN/1.73 M^2
EST. GFR  (NON AFRICAN AMERICAN): >60 ML/MIN/1.73 M^2
ESTIMATED AVG GLUCOSE: 151 MG/DL (ref 68–131)
GLUCOSE SERPL-MCNC: 83 MG/DL (ref 70–110)
HBA1C MFR BLD: 6.9 % (ref 4–5.6)
POTASSIUM SERPL-SCNC: 4.5 MMOL/L (ref 3.5–5.1)
PROT SERPL-MCNC: 7.9 G/DL (ref 6–8.4)
SODIUM SERPL-SCNC: 139 MMOL/L (ref 136–145)
TSH SERPL DL<=0.005 MIU/L-ACNC: 2.93 UIU/ML (ref 0.4–4)

## 2021-07-07 ENCOUNTER — PATIENT MESSAGE (OUTPATIENT)
Dept: ADMINISTRATIVE | Facility: HOSPITAL | Age: 46
End: 2021-07-07

## 2021-07-08 DIAGNOSIS — E11.9 TYPE 2 DIABETES MELLITUS WITHOUT COMPLICATION, UNSPECIFIED WHETHER LONG TERM INSULIN USE: ICD-10-CM

## 2021-07-08 DIAGNOSIS — E11.9 TYPE 2 DIABETES MELLITUS WITHOUT COMPLICATION: ICD-10-CM

## 2021-07-09 ENCOUNTER — IMMUNIZATION (OUTPATIENT)
Dept: PRIMARY CARE CLINIC | Facility: CLINIC | Age: 46
End: 2021-07-09
Payer: COMMERCIAL

## 2021-07-09 DIAGNOSIS — Z23 NEED FOR VACCINATION: Primary | ICD-10-CM

## 2021-07-09 PROCEDURE — 91300 COVID-19, MRNA, LNP-S, PF, 30 MCG/0.3 ML DOSE VACCINE: ICD-10-PCS | Mod: S$GLB,,, | Performed by: FAMILY MEDICINE

## 2021-07-09 PROCEDURE — 0001A COVID-19, MRNA, LNP-S, PF, 30 MCG/0.3 ML DOSE VACCINE: ICD-10-PCS | Mod: CV19,S$GLB,, | Performed by: FAMILY MEDICINE

## 2021-07-09 PROCEDURE — 91300 COVID-19, MRNA, LNP-S, PF, 30 MCG/0.3 ML DOSE VACCINE: CPT | Mod: S$GLB,,, | Performed by: FAMILY MEDICINE

## 2021-07-09 PROCEDURE — 0001A COVID-19, MRNA, LNP-S, PF, 30 MCG/0.3 ML DOSE VACCINE: CPT | Mod: CV19,S$GLB,, | Performed by: FAMILY MEDICINE

## 2021-07-11 ENCOUNTER — PATIENT MESSAGE (OUTPATIENT)
Dept: OBSTETRICS AND GYNECOLOGY | Facility: CLINIC | Age: 46
End: 2021-07-11

## 2021-07-27 ENCOUNTER — PATIENT MESSAGE (OUTPATIENT)
Dept: OBSTETRICS AND GYNECOLOGY | Facility: CLINIC | Age: 46
End: 2021-07-27

## 2021-07-28 ENCOUNTER — PATIENT OUTREACH (OUTPATIENT)
Dept: ADMINISTRATIVE | Facility: OTHER | Age: 46
End: 2021-07-28

## 2021-07-30 ENCOUNTER — IMMUNIZATION (OUTPATIENT)
Dept: PRIMARY CARE CLINIC | Facility: CLINIC | Age: 46
End: 2021-07-30
Payer: COMMERCIAL

## 2021-07-30 DIAGNOSIS — Z23 NEED FOR VACCINATION: Primary | ICD-10-CM

## 2021-07-30 PROCEDURE — 91300 COVID-19, MRNA, LNP-S, PF, 30 MCG/0.3 ML DOSE VACCINE: ICD-10-PCS | Mod: S$GLB,,, | Performed by: FAMILY MEDICINE

## 2021-07-30 PROCEDURE — 0002A COVID-19, MRNA, LNP-S, PF, 30 MCG/0.3 ML DOSE VACCINE: ICD-10-PCS | Mod: CV19,S$GLB,, | Performed by: FAMILY MEDICINE

## 2021-07-30 PROCEDURE — 0002A COVID-19, MRNA, LNP-S, PF, 30 MCG/0.3 ML DOSE VACCINE: CPT | Mod: CV19,S$GLB,, | Performed by: FAMILY MEDICINE

## 2021-07-30 PROCEDURE — 91300 COVID-19, MRNA, LNP-S, PF, 30 MCG/0.3 ML DOSE VACCINE: CPT | Mod: S$GLB,,, | Performed by: FAMILY MEDICINE

## 2021-08-03 ENCOUNTER — OFFICE VISIT (OUTPATIENT)
Dept: ENDOCRINOLOGY | Facility: CLINIC | Age: 46
End: 2021-08-03
Payer: COMMERCIAL

## 2021-08-03 VITALS
DIASTOLIC BLOOD PRESSURE: 86 MMHG | BODY MASS INDEX: 45.99 KG/M2 | HEIGHT: 67 IN | WEIGHT: 293 LBS | SYSTOLIC BLOOD PRESSURE: 144 MMHG | HEART RATE: 71 BPM | OXYGEN SATURATION: 96 % | TEMPERATURE: 99 F

## 2021-08-03 DIAGNOSIS — E11.69 TYPE 2 DIABETES MELLITUS WITH OTHER SPECIFIED COMPLICATION, WITHOUT LONG-TERM CURRENT USE OF INSULIN: Primary | ICD-10-CM

## 2021-08-03 DIAGNOSIS — E01.0 THYROMEGALY: ICD-10-CM

## 2021-08-03 DIAGNOSIS — E66.01 MORBID OBESITY WITH BMI OF 60.0-69.9, ADULT: ICD-10-CM

## 2021-08-03 DIAGNOSIS — E61.1 IRON DEFICIENCY: ICD-10-CM

## 2021-08-03 DIAGNOSIS — E03.9 HYPOTHYROIDISM, UNSPECIFIED TYPE: ICD-10-CM

## 2021-08-03 PROCEDURE — 3079F DIAST BP 80-89 MM HG: CPT | Mod: CPTII,S$GLB,, | Performed by: PHYSICIAN ASSISTANT

## 2021-08-03 PROCEDURE — 1126F PR PAIN SEVERITY QUANTIFIED, NO PAIN PRESENT: ICD-10-PCS | Mod: CPTII,S$GLB,, | Performed by: PHYSICIAN ASSISTANT

## 2021-08-03 PROCEDURE — 1159F MED LIST DOCD IN RCRD: CPT | Mod: CPTII,S$GLB,, | Performed by: PHYSICIAN ASSISTANT

## 2021-08-03 PROCEDURE — 99999 PR PBB SHADOW E&M-EST. PATIENT-LVL V: CPT | Mod: PBBFAC,,, | Performed by: PHYSICIAN ASSISTANT

## 2021-08-03 PROCEDURE — 1160F RVW MEDS BY RX/DR IN RCRD: CPT | Mod: CPTII,S$GLB,, | Performed by: PHYSICIAN ASSISTANT

## 2021-08-03 PROCEDURE — 3077F SYST BP >= 140 MM HG: CPT | Mod: CPTII,S$GLB,, | Performed by: PHYSICIAN ASSISTANT

## 2021-08-03 PROCEDURE — 1159F PR MEDICATION LIST DOCUMENTED IN MEDICAL RECORD: ICD-10-PCS | Mod: CPTII,S$GLB,, | Performed by: PHYSICIAN ASSISTANT

## 2021-08-03 PROCEDURE — 99204 PR OFFICE/OUTPT VISIT, NEW, LEVL IV, 45-59 MIN: ICD-10-PCS | Mod: S$GLB,,, | Performed by: PHYSICIAN ASSISTANT

## 2021-08-03 PROCEDURE — 3077F PR MOST RECENT SYSTOLIC BLOOD PRESSURE >= 140 MM HG: ICD-10-PCS | Mod: CPTII,S$GLB,, | Performed by: PHYSICIAN ASSISTANT

## 2021-08-03 PROCEDURE — 99204 OFFICE O/P NEW MOD 45 MIN: CPT | Mod: S$GLB,,, | Performed by: PHYSICIAN ASSISTANT

## 2021-08-03 PROCEDURE — 3008F BODY MASS INDEX DOCD: CPT | Mod: CPTII,S$GLB,, | Performed by: PHYSICIAN ASSISTANT

## 2021-08-03 PROCEDURE — 3079F PR MOST RECENT DIASTOLIC BLOOD PRESSURE 80-89 MM HG: ICD-10-PCS | Mod: CPTII,S$GLB,, | Performed by: PHYSICIAN ASSISTANT

## 2021-08-03 PROCEDURE — 1160F PR REVIEW ALL MEDS BY PRESCRIBER/CLIN PHARMACIST DOCUMENTED: ICD-10-PCS | Mod: CPTII,S$GLB,, | Performed by: PHYSICIAN ASSISTANT

## 2021-08-03 PROCEDURE — 3008F PR BODY MASS INDEX (BMI) DOCUMENTED: ICD-10-PCS | Mod: CPTII,S$GLB,, | Performed by: PHYSICIAN ASSISTANT

## 2021-08-03 PROCEDURE — 3044F HG A1C LEVEL LT 7.0%: CPT | Mod: CPTII,S$GLB,, | Performed by: PHYSICIAN ASSISTANT

## 2021-08-03 PROCEDURE — 1126F AMNT PAIN NOTED NONE PRSNT: CPT | Mod: CPTII,S$GLB,, | Performed by: PHYSICIAN ASSISTANT

## 2021-08-03 PROCEDURE — 99999 PR PBB SHADOW E&M-EST. PATIENT-LVL V: ICD-10-PCS | Mod: PBBFAC,,, | Performed by: PHYSICIAN ASSISTANT

## 2021-08-03 PROCEDURE — 3044F PR MOST RECENT HEMOGLOBIN A1C LEVEL <7.0%: ICD-10-PCS | Mod: CPTII,S$GLB,, | Performed by: PHYSICIAN ASSISTANT

## 2021-08-03 RX ORDER — LEVOTHYROXINE SODIUM 75 UG/1
75 TABLET ORAL
Qty: 30 TABLET | Refills: 11 | Status: SHIPPED | OUTPATIENT
Start: 2021-08-03 | End: 2022-03-09

## 2021-08-03 RX ORDER — SEMAGLUTIDE 1.34 MG/ML
0.25 INJECTION, SOLUTION SUBCUTANEOUS
Qty: 1 PEN | Refills: 5 | Status: SHIPPED | OUTPATIENT
Start: 2021-08-03 | End: 2021-11-09 | Stop reason: SDUPTHER

## 2021-08-18 ENCOUNTER — OFFICE VISIT (OUTPATIENT)
Dept: OBSTETRICS AND GYNECOLOGY | Facility: CLINIC | Age: 46
End: 2021-08-18
Payer: COMMERCIAL

## 2021-08-18 VITALS
HEIGHT: 67 IN | BODY MASS INDEX: 45.99 KG/M2 | RESPIRATION RATE: 16 BRPM | SYSTOLIC BLOOD PRESSURE: 122 MMHG | DIASTOLIC BLOOD PRESSURE: 76 MMHG | WEIGHT: 293 LBS

## 2021-08-18 DIAGNOSIS — Z30.430 ENCOUNTER FOR INSERTION OF MIRENA IUD: Primary | ICD-10-CM

## 2021-08-18 LAB
B-HCG UR QL: NEGATIVE
CTP QC/QA: YES

## 2021-08-18 PROCEDURE — 3044F PR MOST RECENT HEMOGLOBIN A1C LEVEL <7.0%: ICD-10-PCS | Mod: CPTII,S$GLB,, | Performed by: SPECIALIST

## 2021-08-18 PROCEDURE — 58300 INSERT INTRAUTERINE DEVICE: CPT | Mod: S$GLB,,, | Performed by: SPECIALIST

## 2021-08-18 PROCEDURE — 58300 PR INSERT INTRAUTERINE DEVICE: ICD-10-PCS | Mod: S$GLB,,, | Performed by: SPECIALIST

## 2021-08-18 PROCEDURE — 3078F DIAST BP <80 MM HG: CPT | Mod: CPTII,S$GLB,, | Performed by: SPECIALIST

## 2021-08-18 PROCEDURE — 3008F BODY MASS INDEX DOCD: CPT | Mod: CPTII,S$GLB,, | Performed by: SPECIALIST

## 2021-08-18 PROCEDURE — 3078F PR MOST RECENT DIASTOLIC BLOOD PRESSURE < 80 MM HG: ICD-10-PCS | Mod: CPTII,S$GLB,, | Performed by: SPECIALIST

## 2021-08-18 PROCEDURE — 3074F PR MOST RECENT SYSTOLIC BLOOD PRESSURE < 130 MM HG: ICD-10-PCS | Mod: CPTII,S$GLB,, | Performed by: SPECIALIST

## 2021-08-18 PROCEDURE — 99999 PR PBB SHADOW E&M-EST. PATIENT-LVL III: ICD-10-PCS | Mod: PBBFAC,,, | Performed by: SPECIALIST

## 2021-08-18 PROCEDURE — 1126F AMNT PAIN NOTED NONE PRSNT: CPT | Mod: CPTII,S$GLB,, | Performed by: SPECIALIST

## 2021-08-18 PROCEDURE — 3044F HG A1C LEVEL LT 7.0%: CPT | Mod: CPTII,S$GLB,, | Performed by: SPECIALIST

## 2021-08-18 PROCEDURE — 3008F PR BODY MASS INDEX (BMI) DOCUMENTED: ICD-10-PCS | Mod: CPTII,S$GLB,, | Performed by: SPECIALIST

## 2021-08-18 PROCEDURE — 81025 POCT URINE PREGNANCY: ICD-10-PCS | Mod: S$GLB,,, | Performed by: SPECIALIST

## 2021-08-18 PROCEDURE — 1159F MED LIST DOCD IN RCRD: CPT | Mod: CPTII,S$GLB,, | Performed by: SPECIALIST

## 2021-08-18 PROCEDURE — 99213 OFFICE O/P EST LOW 20 MIN: CPT | Mod: 25,S$GLB,, | Performed by: SPECIALIST

## 2021-08-18 PROCEDURE — 81025 URINE PREGNANCY TEST: CPT | Mod: S$GLB,,, | Performed by: SPECIALIST

## 2021-08-18 PROCEDURE — 99213 PR OFFICE/OUTPT VISIT, EST, LEVL III, 20-29 MIN: ICD-10-PCS | Mod: 25,S$GLB,, | Performed by: SPECIALIST

## 2021-08-18 PROCEDURE — 3074F SYST BP LT 130 MM HG: CPT | Mod: CPTII,S$GLB,, | Performed by: SPECIALIST

## 2021-08-18 PROCEDURE — 1126F PR PAIN SEVERITY QUANTIFIED, NO PAIN PRESENT: ICD-10-PCS | Mod: CPTII,S$GLB,, | Performed by: SPECIALIST

## 2021-08-18 PROCEDURE — 87591 N.GONORRHOEAE DNA AMP PROB: CPT | Performed by: SPECIALIST

## 2021-08-18 PROCEDURE — 87491 CHLMYD TRACH DNA AMP PROBE: CPT | Performed by: SPECIALIST

## 2021-08-18 PROCEDURE — 1159F PR MEDICATION LIST DOCUMENTED IN MEDICAL RECORD: ICD-10-PCS | Mod: CPTII,S$GLB,, | Performed by: SPECIALIST

## 2021-08-18 PROCEDURE — 99999 PR PBB SHADOW E&M-EST. PATIENT-LVL III: CPT | Mod: PBBFAC,,, | Performed by: SPECIALIST

## 2021-08-20 LAB
C TRACH DNA SPEC QL NAA+PROBE: NOT DETECTED
N GONORRHOEA DNA SPEC QL NAA+PROBE: NOT DETECTED

## 2021-10-04 ENCOUNTER — PATIENT OUTREACH (OUTPATIENT)
Dept: ADMINISTRATIVE | Facility: OTHER | Age: 46
End: 2021-10-04

## 2021-10-07 ENCOUNTER — PATIENT MESSAGE (OUTPATIENT)
Dept: OBSTETRICS AND GYNECOLOGY | Facility: CLINIC | Age: 46
End: 2021-10-07

## 2021-10-07 ENCOUNTER — PATIENT MESSAGE (OUTPATIENT)
Dept: ADMINISTRATIVE | Facility: HOSPITAL | Age: 46
End: 2021-10-07

## 2021-10-10 ENCOUNTER — PATIENT MESSAGE (OUTPATIENT)
Dept: FAMILY MEDICINE | Facility: CLINIC | Age: 46
End: 2021-10-10

## 2021-10-11 ENCOUNTER — PATIENT MESSAGE (OUTPATIENT)
Dept: FAMILY MEDICINE | Facility: CLINIC | Age: 46
End: 2021-10-11

## 2021-10-12 ENCOUNTER — HOSPITAL ENCOUNTER (OUTPATIENT)
Dept: RADIOLOGY | Facility: CLINIC | Age: 46
Discharge: HOME | End: 2021-10-12
Attending: PHYSICIAN ASSISTANT
Payer: COMMERCIAL

## 2021-10-12 ENCOUNTER — OFFICE VISIT (OUTPATIENT)
Dept: FAMILY MEDICINE | Facility: CLINIC | Age: 46
End: 2021-10-12
Payer: COMMERCIAL

## 2021-10-12 VITALS
DIASTOLIC BLOOD PRESSURE: 78 MMHG | BODY MASS INDEX: 45.99 KG/M2 | SYSTOLIC BLOOD PRESSURE: 126 MMHG | TEMPERATURE: 98 F | WEIGHT: 293 LBS | HEART RATE: 57 BPM | HEIGHT: 67 IN | OXYGEN SATURATION: 96 %

## 2021-10-12 DIAGNOSIS — M25.532 LEFT WRIST PAIN: Primary | ICD-10-CM

## 2021-10-12 DIAGNOSIS — M25.532 LEFT WRIST PAIN: ICD-10-CM

## 2021-10-12 PROCEDURE — 73110 X-RAY EXAM OF WRIST: CPT | Mod: TC,FY,PO,LT

## 2021-10-12 PROCEDURE — 3008F PR BODY MASS INDEX (BMI) DOCUMENTED: ICD-10-PCS | Mod: CPTII,S$GLB,, | Performed by: PHYSICIAN ASSISTANT

## 2021-10-12 PROCEDURE — 99999 PR PBB SHADOW E&M-EST. PATIENT-LVL IV: CPT | Mod: PBBFAC,,, | Performed by: PHYSICIAN ASSISTANT

## 2021-10-12 PROCEDURE — 73110 X-RAY EXAM OF WRIST: CPT | Mod: 26,LT,S$GLB, | Performed by: RADIOLOGY

## 2021-10-12 PROCEDURE — 3078F PR MOST RECENT DIASTOLIC BLOOD PRESSURE < 80 MM HG: ICD-10-PCS | Mod: CPTII,S$GLB,, | Performed by: PHYSICIAN ASSISTANT

## 2021-10-12 PROCEDURE — 3074F SYST BP LT 130 MM HG: CPT | Mod: CPTII,S$GLB,, | Performed by: PHYSICIAN ASSISTANT

## 2021-10-12 PROCEDURE — 73110 XR WRIST COMPLETE 3 VIEWS LEFT: ICD-10-PCS | Mod: 26,LT,S$GLB, | Performed by: RADIOLOGY

## 2021-10-12 PROCEDURE — 1160F PR REVIEW ALL MEDS BY PRESCRIBER/CLIN PHARMACIST DOCUMENTED: ICD-10-PCS | Mod: CPTII,S$GLB,, | Performed by: PHYSICIAN ASSISTANT

## 2021-10-12 PROCEDURE — 3044F PR MOST RECENT HEMOGLOBIN A1C LEVEL <7.0%: ICD-10-PCS | Mod: CPTII,S$GLB,, | Performed by: PHYSICIAN ASSISTANT

## 2021-10-12 PROCEDURE — 1159F PR MEDICATION LIST DOCUMENTED IN MEDICAL RECORD: ICD-10-PCS | Mod: CPTII,S$GLB,, | Performed by: PHYSICIAN ASSISTANT

## 2021-10-12 PROCEDURE — 1159F MED LIST DOCD IN RCRD: CPT | Mod: CPTII,S$GLB,, | Performed by: PHYSICIAN ASSISTANT

## 2021-10-12 PROCEDURE — 3008F BODY MASS INDEX DOCD: CPT | Mod: CPTII,S$GLB,, | Performed by: PHYSICIAN ASSISTANT

## 2021-10-12 PROCEDURE — 3044F HG A1C LEVEL LT 7.0%: CPT | Mod: CPTII,S$GLB,, | Performed by: PHYSICIAN ASSISTANT

## 2021-10-12 PROCEDURE — 1160F RVW MEDS BY RX/DR IN RCRD: CPT | Mod: CPTII,S$GLB,, | Performed by: PHYSICIAN ASSISTANT

## 2021-10-12 PROCEDURE — 99999 PR PBB SHADOW E&M-EST. PATIENT-LVL IV: ICD-10-PCS | Mod: PBBFAC,,, | Performed by: PHYSICIAN ASSISTANT

## 2021-10-12 PROCEDURE — 99214 PR OFFICE/OUTPT VISIT, EST, LEVL IV, 30-39 MIN: ICD-10-PCS | Mod: S$GLB,,, | Performed by: PHYSICIAN ASSISTANT

## 2021-10-12 PROCEDURE — 99214 OFFICE O/P EST MOD 30 MIN: CPT | Mod: S$GLB,,, | Performed by: PHYSICIAN ASSISTANT

## 2021-10-12 PROCEDURE — 3078F DIAST BP <80 MM HG: CPT | Mod: CPTII,S$GLB,, | Performed by: PHYSICIAN ASSISTANT

## 2021-10-12 PROCEDURE — 3074F PR MOST RECENT SYSTOLIC BLOOD PRESSURE < 130 MM HG: ICD-10-PCS | Mod: CPTII,S$GLB,, | Performed by: PHYSICIAN ASSISTANT

## 2021-10-12 RX ORDER — INDOMETHACIN 25 MG/1
25 CAPSULE ORAL 2 TIMES DAILY WITH MEALS
Qty: 20 CAPSULE | Refills: 0 | Status: SHIPPED | OUTPATIENT
Start: 2021-10-12 | End: 2021-11-22 | Stop reason: SDUPTHER

## 2021-10-13 RX ORDER — ALLOPURINOL 100 MG/1
100 TABLET ORAL DAILY
Qty: 30 TABLET | Refills: 5 | Status: SHIPPED | OUTPATIENT
Start: 2021-10-13 | End: 2022-07-08 | Stop reason: SDUPTHER

## 2021-10-15 ENCOUNTER — CLINICAL SUPPORT (OUTPATIENT)
Dept: DIABETES | Facility: CLINIC | Age: 46
End: 2021-10-15
Payer: COMMERCIAL

## 2021-10-15 DIAGNOSIS — E11.69 TYPE 2 DIABETES MELLITUS WITH OTHER SPECIFIED COMPLICATION, WITHOUT LONG-TERM CURRENT USE OF INSULIN: ICD-10-CM

## 2021-10-15 PROCEDURE — G0108 PR DIAB MANAGE TRN  PER INDIV: ICD-10-PCS | Mod: S$GLB,,, | Performed by: DIETITIAN, REGISTERED

## 2021-10-15 PROCEDURE — G0108 DIAB MANAGE TRN  PER INDIV: HCPCS | Mod: S$GLB,,, | Performed by: DIETITIAN, REGISTERED

## 2021-10-15 PROCEDURE — 99999 PR PBB SHADOW E&M-EST. PATIENT-LVL II: CPT | Mod: PBBFAC,,, | Performed by: DIETITIAN, REGISTERED

## 2021-10-15 PROCEDURE — 99999 PR PBB SHADOW E&M-EST. PATIENT-LVL II: ICD-10-PCS | Mod: PBBFAC,,, | Performed by: DIETITIAN, REGISTERED

## 2021-10-19 ENCOUNTER — PATIENT MESSAGE (OUTPATIENT)
Dept: DIABETES | Facility: CLINIC | Age: 46
End: 2021-10-19
Payer: COMMERCIAL

## 2021-10-19 VITALS — WEIGHT: 293 LBS | BODY MASS INDEX: 45.99 KG/M2 | HEIGHT: 67 IN

## 2021-10-27 ENCOUNTER — PATIENT MESSAGE (OUTPATIENT)
Dept: FAMILY MEDICINE | Facility: CLINIC | Age: 46
End: 2021-10-27
Payer: COMMERCIAL

## 2021-11-08 ENCOUNTER — LAB VISIT (OUTPATIENT)
Dept: LAB | Facility: HOSPITAL | Age: 46
End: 2021-11-08
Attending: PHYSICIAN ASSISTANT
Payer: COMMERCIAL

## 2021-11-08 DIAGNOSIS — E11.69 TYPE 2 DIABETES MELLITUS WITH OTHER SPECIFIED COMPLICATION, WITHOUT LONG-TERM CURRENT USE OF INSULIN: ICD-10-CM

## 2021-11-08 DIAGNOSIS — E03.9 HYPOTHYROIDISM, UNSPECIFIED TYPE: ICD-10-CM

## 2021-11-08 LAB
ALBUMIN SERPL BCP-MCNC: 3.6 G/DL (ref 3.5–5.2)
ANION GAP SERPL CALC-SCNC: 9 MMOL/L (ref 8–16)
BUN SERPL-MCNC: 9 MG/DL (ref 6–20)
CALCIUM SERPL-MCNC: 9.5 MG/DL (ref 8.7–10.5)
CHLORIDE SERPL-SCNC: 102 MMOL/L (ref 95–110)
CHOLEST SERPL-MCNC: 181 MG/DL (ref 120–199)
CHOLEST/HDLC SERPL: 4.1 {RATIO} (ref 2–5)
CO2 SERPL-SCNC: 27 MMOL/L (ref 23–29)
CREAT SERPL-MCNC: 0.8 MG/DL (ref 0.5–1.4)
EST. GFR  (AFRICAN AMERICAN): >60 ML/MIN/1.73 M^2
EST. GFR  (NON AFRICAN AMERICAN): >60 ML/MIN/1.73 M^2
ESTIMATED AVG GLUCOSE: 151 MG/DL (ref 68–131)
GLUCOSE SERPL-MCNC: 114 MG/DL (ref 70–110)
HBA1C MFR BLD: 6.9 % (ref 4–5.6)
HDLC SERPL-MCNC: 44 MG/DL (ref 40–75)
HDLC SERPL: 24.3 % (ref 20–50)
LDLC SERPL CALC-MCNC: 121.8 MG/DL (ref 63–159)
NONHDLC SERPL-MCNC: 137 MG/DL
PHOSPHATE SERPL-MCNC: 3 MG/DL (ref 2.7–4.5)
POTASSIUM SERPL-SCNC: 3.9 MMOL/L (ref 3.5–5.1)
SODIUM SERPL-SCNC: 138 MMOL/L (ref 136–145)
T4 FREE SERPL-MCNC: 0.91 NG/DL (ref 0.71–1.51)
TRIGL SERPL-MCNC: 76 MG/DL (ref 30–150)
TSH SERPL DL<=0.005 MIU/L-ACNC: 1.57 UIU/ML (ref 0.4–4)

## 2021-11-08 PROCEDURE — 80069 RENAL FUNCTION PANEL: CPT | Performed by: PHYSICIAN ASSISTANT

## 2021-11-08 PROCEDURE — 80061 LIPID PANEL: CPT | Performed by: PHYSICIAN ASSISTANT

## 2021-11-08 PROCEDURE — 84443 ASSAY THYROID STIM HORMONE: CPT | Performed by: PHYSICIAN ASSISTANT

## 2021-11-08 PROCEDURE — 84439 ASSAY OF FREE THYROXINE: CPT | Performed by: PHYSICIAN ASSISTANT

## 2021-11-08 PROCEDURE — 83036 HEMOGLOBIN GLYCOSYLATED A1C: CPT | Performed by: PHYSICIAN ASSISTANT

## 2021-11-08 PROCEDURE — 36415 COLL VENOUS BLD VENIPUNCTURE: CPT | Mod: PO | Performed by: PHYSICIAN ASSISTANT

## 2021-11-09 ENCOUNTER — OFFICE VISIT (OUTPATIENT)
Dept: ENDOCRINOLOGY | Facility: CLINIC | Age: 46
End: 2021-11-09
Payer: COMMERCIAL

## 2021-11-09 ENCOUNTER — PATIENT OUTREACH (OUTPATIENT)
Dept: ADMINISTRATIVE | Facility: OTHER | Age: 46
End: 2021-11-09
Payer: COMMERCIAL

## 2021-11-09 VITALS
HEART RATE: 60 BPM | BODY MASS INDEX: 45.99 KG/M2 | OXYGEN SATURATION: 97 % | TEMPERATURE: 99 F | DIASTOLIC BLOOD PRESSURE: 80 MMHG | HEIGHT: 67 IN | WEIGHT: 293 LBS | SYSTOLIC BLOOD PRESSURE: 126 MMHG

## 2021-11-09 DIAGNOSIS — E11.69 TYPE 2 DIABETES MELLITUS WITH OTHER SPECIFIED COMPLICATION, WITHOUT LONG-TERM CURRENT USE OF INSULIN: Primary | ICD-10-CM

## 2021-11-09 DIAGNOSIS — E03.9 HYPOTHYROIDISM, UNSPECIFIED TYPE: ICD-10-CM

## 2021-11-09 DIAGNOSIS — E61.1 IRON DEFICIENCY: ICD-10-CM

## 2021-11-09 DIAGNOSIS — E78.5 HYPERLIPIDEMIA, UNSPECIFIED HYPERLIPIDEMIA TYPE: ICD-10-CM

## 2021-11-09 DIAGNOSIS — E04.1 NODULAR THYROID DISEASE: ICD-10-CM

## 2021-11-09 PROCEDURE — 3044F PR MOST RECENT HEMOGLOBIN A1C LEVEL <7.0%: ICD-10-PCS | Mod: CPTII,S$GLB,, | Performed by: PHYSICIAN ASSISTANT

## 2021-11-09 PROCEDURE — 1160F RVW MEDS BY RX/DR IN RCRD: CPT | Mod: CPTII,S$GLB,, | Performed by: PHYSICIAN ASSISTANT

## 2021-11-09 PROCEDURE — 1159F PR MEDICATION LIST DOCUMENTED IN MEDICAL RECORD: ICD-10-PCS | Mod: CPTII,S$GLB,, | Performed by: PHYSICIAN ASSISTANT

## 2021-11-09 PROCEDURE — 99999 PR PBB SHADOW E&M-EST. PATIENT-LVL III: CPT | Mod: PBBFAC,,, | Performed by: PHYSICIAN ASSISTANT

## 2021-11-09 PROCEDURE — 3008F PR BODY MASS INDEX (BMI) DOCUMENTED: ICD-10-PCS | Mod: CPTII,S$GLB,, | Performed by: PHYSICIAN ASSISTANT

## 2021-11-09 PROCEDURE — 99214 PR OFFICE/OUTPT VISIT, EST, LEVL IV, 30-39 MIN: ICD-10-PCS | Mod: S$GLB,,, | Performed by: PHYSICIAN ASSISTANT

## 2021-11-09 PROCEDURE — 3079F PR MOST RECENT DIASTOLIC BLOOD PRESSURE 80-89 MM HG: ICD-10-PCS | Mod: CPTII,S$GLB,, | Performed by: PHYSICIAN ASSISTANT

## 2021-11-09 PROCEDURE — 1159F MED LIST DOCD IN RCRD: CPT | Mod: CPTII,S$GLB,, | Performed by: PHYSICIAN ASSISTANT

## 2021-11-09 PROCEDURE — 99214 OFFICE O/P EST MOD 30 MIN: CPT | Mod: S$GLB,,, | Performed by: PHYSICIAN ASSISTANT

## 2021-11-09 PROCEDURE — 3079F DIAST BP 80-89 MM HG: CPT | Mod: CPTII,S$GLB,, | Performed by: PHYSICIAN ASSISTANT

## 2021-11-09 PROCEDURE — 3044F HG A1C LEVEL LT 7.0%: CPT | Mod: CPTII,S$GLB,, | Performed by: PHYSICIAN ASSISTANT

## 2021-11-09 PROCEDURE — 3074F PR MOST RECENT SYSTOLIC BLOOD PRESSURE < 130 MM HG: ICD-10-PCS | Mod: CPTII,S$GLB,, | Performed by: PHYSICIAN ASSISTANT

## 2021-11-09 PROCEDURE — 1160F PR REVIEW ALL MEDS BY PRESCRIBER/CLIN PHARMACIST DOCUMENTED: ICD-10-PCS | Mod: CPTII,S$GLB,, | Performed by: PHYSICIAN ASSISTANT

## 2021-11-09 PROCEDURE — 99999 PR PBB SHADOW E&M-EST. PATIENT-LVL III: ICD-10-PCS | Mod: PBBFAC,,, | Performed by: PHYSICIAN ASSISTANT

## 2021-11-09 PROCEDURE — 3008F BODY MASS INDEX DOCD: CPT | Mod: CPTII,S$GLB,, | Performed by: PHYSICIAN ASSISTANT

## 2021-11-09 PROCEDURE — 3074F SYST BP LT 130 MM HG: CPT | Mod: CPTII,S$GLB,, | Performed by: PHYSICIAN ASSISTANT

## 2021-11-09 RX ORDER — ATORVASTATIN CALCIUM 10 MG/1
10 TABLET, FILM COATED ORAL DAILY
Qty: 90 TABLET | Refills: 3 | Status: SHIPPED | OUTPATIENT
Start: 2021-11-09 | End: 2022-01-03

## 2021-11-09 RX ORDER — SEMAGLUTIDE 1.34 MG/ML
0.25 INJECTION, SOLUTION SUBCUTANEOUS
Qty: 1 PEN | Refills: 11 | Status: SHIPPED | OUTPATIENT
Start: 2021-11-09 | End: 2022-03-09 | Stop reason: SDUPTHER

## 2021-11-10 ENCOUNTER — PATIENT MESSAGE (OUTPATIENT)
Dept: ENDOCRINOLOGY | Facility: CLINIC | Age: 46
End: 2021-11-10
Payer: COMMERCIAL

## 2021-11-10 RX ORDER — PEN NEEDLE, DIABETIC 30 GX3/16"
NEEDLE, DISPOSABLE MISCELLANEOUS
Qty: 100 EACH | Refills: 1 | Status: SHIPPED | OUTPATIENT
Start: 2021-11-10 | End: 2023-06-21

## 2021-11-17 ENCOUNTER — PATIENT MESSAGE (OUTPATIENT)
Dept: OBSTETRICS AND GYNECOLOGY | Facility: CLINIC | Age: 46
End: 2021-11-17
Payer: COMMERCIAL

## 2021-11-22 ENCOUNTER — PATIENT MESSAGE (OUTPATIENT)
Dept: FAMILY MEDICINE | Facility: CLINIC | Age: 46
End: 2021-11-22
Payer: COMMERCIAL

## 2021-11-22 RX ORDER — INDOMETHACIN 25 MG/1
25 CAPSULE ORAL 2 TIMES DAILY WITH MEALS
Qty: 20 CAPSULE | Refills: 2 | Status: SHIPPED | OUTPATIENT
Start: 2021-11-22 | End: 2022-06-27 | Stop reason: SDUPTHER

## 2021-12-03 ENCOUNTER — OFFICE VISIT (OUTPATIENT)
Dept: OBSTETRICS AND GYNECOLOGY | Facility: CLINIC | Age: 46
End: 2021-12-03
Payer: COMMERCIAL

## 2021-12-03 VITALS
DIASTOLIC BLOOD PRESSURE: 80 MMHG | HEIGHT: 67 IN | WEIGHT: 293 LBS | SYSTOLIC BLOOD PRESSURE: 142 MMHG | BODY MASS INDEX: 45.99 KG/M2

## 2021-12-03 DIAGNOSIS — N93.8 DUB (DYSFUNCTIONAL UTERINE BLEEDING): Primary | ICD-10-CM

## 2021-12-03 PROCEDURE — 99213 PR OFFICE/OUTPT VISIT, EST, LEVL III, 20-29 MIN: ICD-10-PCS | Mod: S$GLB,,, | Performed by: SPECIALIST

## 2021-12-03 PROCEDURE — 99213 OFFICE O/P EST LOW 20 MIN: CPT | Mod: S$GLB,,, | Performed by: SPECIALIST

## 2021-12-03 PROCEDURE — 99999 PR PBB SHADOW E&M-EST. PATIENT-LVL III: ICD-10-PCS | Mod: PBBFAC,,, | Performed by: SPECIALIST

## 2021-12-03 PROCEDURE — 99999 PR PBB SHADOW E&M-EST. PATIENT-LVL III: CPT | Mod: PBBFAC,,, | Performed by: SPECIALIST

## 2021-12-07 ENCOUNTER — PATIENT MESSAGE (OUTPATIENT)
Dept: OBSTETRICS AND GYNECOLOGY | Facility: CLINIC | Age: 46
End: 2021-12-07
Payer: COMMERCIAL

## 2021-12-08 ENCOUNTER — HOSPITAL ENCOUNTER (OUTPATIENT)
Dept: RADIOLOGY | Facility: HOSPITAL | Age: 46
Discharge: HOME | End: 2021-12-08
Attending: SPECIALIST
Payer: COMMERCIAL

## 2021-12-08 DIAGNOSIS — N93.8 DUB (DYSFUNCTIONAL UTERINE BLEEDING): ICD-10-CM

## 2021-12-08 PROCEDURE — 76856 US EXAM PELVIC COMPLETE: CPT | Mod: 26,,, | Performed by: RADIOLOGY

## 2021-12-08 PROCEDURE — 76830 US PELVIS COMP WITH TRANSVAG NON-OB (XPD): ICD-10-PCS | Mod: 26,,, | Performed by: RADIOLOGY

## 2021-12-08 PROCEDURE — 76856 US EXAM PELVIC COMPLETE: CPT | Mod: TC

## 2021-12-08 PROCEDURE — 76856 US PELVIS COMP WITH TRANSVAG NON-OB (XPD): ICD-10-PCS | Mod: 26,,, | Performed by: RADIOLOGY

## 2021-12-08 PROCEDURE — 76830 TRANSVAGINAL US NON-OB: CPT | Mod: 26,,, | Performed by: RADIOLOGY

## 2021-12-10 ENCOUNTER — PATIENT MESSAGE (OUTPATIENT)
Dept: OBSTETRICS AND GYNECOLOGY | Facility: CLINIC | Age: 46
End: 2021-12-10
Payer: COMMERCIAL

## 2021-12-20 ENCOUNTER — PATIENT OUTREACH (OUTPATIENT)
Dept: ADMINISTRATIVE | Facility: HOSPITAL | Age: 46
End: 2021-12-20
Payer: COMMERCIAL

## 2022-01-01 ENCOUNTER — PATIENT OUTREACH (OUTPATIENT)
Dept: ADMINISTRATIVE | Facility: OTHER | Age: 47
End: 2022-01-01
Payer: COMMERCIAL

## 2022-01-01 NOTE — PROGRESS NOTES
Health Maintenance Due   Topic Date Due    Diabetes Urine Screening  Never done    Pneumococcal Vaccines (Age 0-64) (1 of 2 - PPSV23) Never done    Eye Exam  Never done    Colorectal Cancer Screening  Never done    Influenza Vaccine (1) Never done     Updates were requested from care everywhere.  Chart was reviewed for overdue Proactive [facility] Encounters ([facility]JU) topics (CRS, Breast Cancer Screening, Eye exam)  Health Maintenance has been updated.  LINKS immunization registry triggered.  Immunizations were reconciled.

## 2022-01-03 ENCOUNTER — OFFICE VISIT (OUTPATIENT)
Dept: OBSTETRICS AND GYNECOLOGY | Facility: CLINIC | Age: 47
End: 2022-01-03
Payer: COMMERCIAL

## 2022-01-03 VITALS
HEIGHT: 67 IN | WEIGHT: 293 LBS | SYSTOLIC BLOOD PRESSURE: 138 MMHG | DIASTOLIC BLOOD PRESSURE: 70 MMHG | BODY MASS INDEX: 45.99 KG/M2

## 2022-01-03 DIAGNOSIS — Z01.812 PRE-PROCEDURE LAB EXAM: Primary | ICD-10-CM

## 2022-01-03 DIAGNOSIS — N93.9 ABNORMAL VAGINAL BLEEDING: ICD-10-CM

## 2022-01-03 LAB
B-HCG UR QL: NEGATIVE
CTP QC/QA: YES

## 2022-01-03 PROCEDURE — 88305 TISSUE EXAM BY PATHOLOGIST: CPT | Performed by: PATHOLOGY

## 2022-01-03 PROCEDURE — 3078F DIAST BP <80 MM HG: CPT | Mod: CPTII,S$GLB,, | Performed by: SPECIALIST

## 2022-01-03 PROCEDURE — 3078F PR MOST RECENT DIASTOLIC BLOOD PRESSURE < 80 MM HG: ICD-10-PCS | Mod: CPTII,S$GLB,, | Performed by: SPECIALIST

## 2022-01-03 PROCEDURE — 3075F PR MOST RECENT SYSTOLIC BLOOD PRESS GE 130-139MM HG: ICD-10-PCS | Mod: CPTII,S$GLB,, | Performed by: SPECIALIST

## 2022-01-03 PROCEDURE — 99213 OFFICE O/P EST LOW 20 MIN: CPT | Mod: 25,S$GLB,, | Performed by: SPECIALIST

## 2022-01-03 PROCEDURE — 99213 PR OFFICE/OUTPT VISIT, EST, LEVL III, 20-29 MIN: ICD-10-PCS | Mod: 25,S$GLB,, | Performed by: SPECIALIST

## 2022-01-03 PROCEDURE — 99999 PR PBB SHADOW E&M-EST. PATIENT-LVL IV: ICD-10-PCS | Mod: PBBFAC,,, | Performed by: SPECIALIST

## 2022-01-03 PROCEDURE — 3008F PR BODY MASS INDEX (BMI) DOCUMENTED: ICD-10-PCS | Mod: CPTII,S$GLB,, | Performed by: SPECIALIST

## 2022-01-03 PROCEDURE — 88305 TISSUE EXAM BY PATHOLOGIST: ICD-10-PCS | Mod: 26,,, | Performed by: PATHOLOGY

## 2022-01-03 PROCEDURE — 1159F MED LIST DOCD IN RCRD: CPT | Mod: CPTII,S$GLB,, | Performed by: SPECIALIST

## 2022-01-03 PROCEDURE — 81025 POCT URINE PREGNANCY: ICD-10-PCS | Mod: S$GLB,,, | Performed by: SPECIALIST

## 2022-01-03 PROCEDURE — 99999 PR PBB SHADOW E&M-EST. PATIENT-LVL IV: CPT | Mod: PBBFAC,,, | Performed by: SPECIALIST

## 2022-01-03 PROCEDURE — 3008F BODY MASS INDEX DOCD: CPT | Mod: CPTII,S$GLB,, | Performed by: SPECIALIST

## 2022-01-03 PROCEDURE — 3075F SYST BP GE 130 - 139MM HG: CPT | Mod: CPTII,S$GLB,, | Performed by: SPECIALIST

## 2022-01-03 PROCEDURE — 81025 URINE PREGNANCY TEST: CPT | Mod: S$GLB,,, | Performed by: SPECIALIST

## 2022-01-03 PROCEDURE — 58100 PR BIOPSY OF UTERUS LINING: ICD-10-PCS | Mod: S$GLB,,, | Performed by: SPECIALIST

## 2022-01-03 PROCEDURE — 58100 BIOPSY OF UTERUS LINING: CPT | Mod: S$GLB,,, | Performed by: SPECIALIST

## 2022-01-03 PROCEDURE — 1159F PR MEDICATION LIST DOCUMENTED IN MEDICAL RECORD: ICD-10-PCS | Mod: CPTII,S$GLB,, | Performed by: SPECIALIST

## 2022-01-03 PROCEDURE — 88305 TISSUE EXAM BY PATHOLOGIST: CPT | Mod: 26,,, | Performed by: PATHOLOGY

## 2022-01-03 NOTE — PROGRESS NOTES
47 yo obese WF returns for recommended EMB secondary to DUB and increased risk uterine pathology. U/S does not reveal an IUD as in the pts history.  Past Medical History:   Diagnosis Date    Allergy     Diabetes mellitus, type 2     GERD (gastroesophageal reflux disease)     Hypothyroidism     Morbid obesity     Otitis media     Seasonal allergies        Past Surgical History:   Procedure Laterality Date    MANDIBLE OSTEOTOMY      TYMPANOSTOMY TUBE PLACEMENT  13       Family History   Problem Relation Age of Onset    Hypertension Mother     Asthma Mother     Arthritis Mother     Diabetes Maternal Grandmother     Heart disease Maternal Grandmother         CHF    Breast cancer Neg Hx     Colon cancer Neg Hx     Ovarian cancer Neg Hx     Cancer Neg Hx     Allergic rhinitis Neg Hx     Allergies Neg Hx     Angioedema Neg Hx     Atopy Neg Hx     Eczema Neg Hx     Immunodeficiency Neg Hx     Rhinitis Neg Hx     Urticaria Neg Hx     Lupus Neg Hx     Psoriasis Neg Hx     Melanoma Neg Hx        Social History     Socioeconomic History    Marital status:    Tobacco Use    Smoking status: Former Smoker     Quit date: 4/3/1998     Years since quittin.7    Smokeless tobacco: Never Used   Substance and Sexual Activity    Alcohol use: Yes     Comment: occasional    Drug use: No    Sexual activity: Yes     Partners: Male     Birth control/protection: I.U.D.       Current Outpatient Medications   Medication Sig Dispense Refill    allopurinoL (ZYLOPRIM) 100 MG tablet Take 1 tablet (100 mg total) by mouth once daily. 30 tablet 5    ascorbic acid, vitamin C, (VITAMIN C) 500 MG tablet Take 500 mg by mouth once daily.      EScitalopram oxalate (LEXAPRO) 20 MG tablet TAKE 1 TABLET(20 MG) BY MOUTH EVERY DAY 90 tablet 3    ferrous fumarate 456 mg (150 mg iron) Tab Take by mouth.      ibuprofen (ADVIL,MOTRIN) 800 MG tablet Take 800 mg by mouth every 6 (six) hours as needed.       "indomethacin (INDOCIN) 25 MG capsule Take 1 capsule (25 mg total) by mouth 2 (two) times daily with meals. 20 capsule 2    levonorgestrel (MIRENA) 20 mcg/24 hr (5 years) IUD 1 each by Intrauterine route once.      levothyroxine (SYNTHROID) 75 MCG tablet Take 1 tablet (75 mcg total) by mouth before breakfast. 30 tablet 11    pantoprazole (PROTONIX) 40 MG tablet TAKE 1 TABLET(40 MG) BY MOUTH EVERY DAY 90 tablet 1    pen needle, diabetic (COMFORT EZ PEN NEEDLES) 32 gauge x 5/32" Ndle Use 1x weekly. 100 each 1    semaglutide (OZEMPIC) 0.25 mg or 0.5 mg(2 mg/1.5 mL) pen injector Inject 0.25 mg into the skin every 7 days. 1 pen 11     No current facility-administered medications for this visit.       Review of patient's allergies indicates:  No Known Allergies    Review of System:   General: no chills, fever, night sweats, weight gain or weight loss  Psychological: no depression or suicidal ideation  Breasts: no new or changing breast lumps, nipple discharge or masses.  Respiratory: no cough, shortness of breath, or wheezing  Cardiovascular: no chest pain or dyspnea on exertion  Gastrointestinal: no abdominal pain, change in bowel habits, or black or bloody stools  Genito-Urinary: no incontinence, urinary frequency/urgency or vulvar/vaginal symptoms, pelvic pain   Musculoskeletal: no gait disturbance or muscular weakness    ENDOMETRIAL BIOPSY:  She was counseled on the risks, benefits and alternatives to endometrial biopsy and agrees to proceed. She was specificly informed of the risk of bleeding, infection, uterine perforation and pain. Pippele EB done without complication. uterus sounded [11] cm.    Sample submitted  Note  I appreciate NO IUD sutures at cervix    Plan I discussed lost and likley expelled IUD  I will await pathology and pending the results, further care plan  "

## 2022-01-05 ENCOUNTER — PATIENT MESSAGE (OUTPATIENT)
Dept: ADMINISTRATIVE | Facility: OTHER | Age: 47
End: 2022-01-05
Payer: COMMERCIAL

## 2022-01-07 LAB
FINAL PATHOLOGIC DIAGNOSIS: NORMAL
GROSS: NORMAL
Lab: NORMAL

## 2022-02-23 ENCOUNTER — HOSPITAL ENCOUNTER (OUTPATIENT)
Dept: RADIOLOGY | Facility: HOSPITAL | Age: 47
Discharge: HOME | End: 2022-02-23
Attending: PHYSICIAN ASSISTANT
Payer: COMMERCIAL

## 2022-02-23 DIAGNOSIS — E01.0 THYROMEGALY: ICD-10-CM

## 2022-02-23 PROCEDURE — 76536 US EXAM OF HEAD AND NECK: CPT | Mod: TC

## 2022-02-23 PROCEDURE — 76536 US EXAM OF HEAD AND NECK: CPT | Mod: 26,,, | Performed by: RADIOLOGY

## 2022-02-23 PROCEDURE — 76536 US SOFT TISSUE HEAD NECK THYROID: ICD-10-PCS | Mod: 26,,, | Performed by: RADIOLOGY

## 2022-02-25 ENCOUNTER — PATIENT MESSAGE (OUTPATIENT)
Dept: ENDOCRINOLOGY | Facility: CLINIC | Age: 47
End: 2022-02-25
Payer: COMMERCIAL

## 2022-02-25 DIAGNOSIS — E04.1 NODULAR THYROID DISEASE: Primary | ICD-10-CM

## 2022-03-02 ENCOUNTER — TELEPHONE (OUTPATIENT)
Dept: ENDOCRINOLOGY | Facility: CLINIC | Age: 47
End: 2022-03-02
Payer: COMMERCIAL

## 2022-03-04 ENCOUNTER — LAB VISIT (OUTPATIENT)
Dept: LAB | Facility: HOSPITAL | Age: 47
End: 2022-03-04
Attending: FAMILY MEDICINE
Payer: COMMERCIAL

## 2022-03-04 DIAGNOSIS — E04.1 NODULAR THYROID DISEASE: ICD-10-CM

## 2022-03-04 DIAGNOSIS — E03.9 HYPOTHYROIDISM, UNSPECIFIED TYPE: ICD-10-CM

## 2022-03-04 DIAGNOSIS — E11.69 TYPE 2 DIABETES MELLITUS WITH OTHER SPECIFIED COMPLICATION, WITHOUT LONG-TERM CURRENT USE OF INSULIN: ICD-10-CM

## 2022-03-04 LAB
ALBUMIN SERPL BCP-MCNC: 3.4 G/DL (ref 3.5–5.2)
ALP SERPL-CCNC: 58 U/L (ref 55–135)
ALT SERPL W/O P-5'-P-CCNC: 19 U/L (ref 10–44)
ANION GAP SERPL CALC-SCNC: 11 MMOL/L (ref 8–16)
AST SERPL-CCNC: 14 U/L (ref 10–40)
BILIRUB SERPL-MCNC: 0.4 MG/DL (ref 0.1–1)
BUN SERPL-MCNC: 12 MG/DL (ref 6–20)
CALCIUM SERPL-MCNC: 9.4 MG/DL (ref 8.7–10.5)
CHLORIDE SERPL-SCNC: 102 MMOL/L (ref 95–110)
CHOLEST SERPL-MCNC: 193 MG/DL (ref 120–199)
CHOLEST/HDLC SERPL: 3.4 {RATIO} (ref 2–5)
CO2 SERPL-SCNC: 25 MMOL/L (ref 23–29)
CREAT SERPL-MCNC: 0.7 MG/DL (ref 0.5–1.4)
EST. GFR  (AFRICAN AMERICAN): >60 ML/MIN/1.73 M^2
EST. GFR  (NON AFRICAN AMERICAN): >60 ML/MIN/1.73 M^2
ESTIMATED AVG GLUCOSE: 134 MG/DL (ref 68–131)
GLUCOSE SERPL-MCNC: 122 MG/DL (ref 70–110)
HBA1C MFR BLD: 6.3 % (ref 4–5.6)
HDLC SERPL-MCNC: 56 MG/DL (ref 40–75)
HDLC SERPL: 29 % (ref 20–50)
LDLC SERPL CALC-MCNC: 124.2 MG/DL (ref 63–159)
NONHDLC SERPL-MCNC: 137 MG/DL
POTASSIUM SERPL-SCNC: 4.6 MMOL/L (ref 3.5–5.1)
PROT SERPL-MCNC: 7.3 G/DL (ref 6–8.4)
SODIUM SERPL-SCNC: 138 MMOL/L (ref 136–145)
T4 FREE SERPL-MCNC: 0.8 NG/DL (ref 0.71–1.51)
T4 FREE SERPL-MCNC: 0.8 NG/DL (ref 0.71–1.51)
TRIGL SERPL-MCNC: 64 MG/DL (ref 30–150)
TSH SERPL DL<=0.005 MIU/L-ACNC: 4.34 UIU/ML (ref 0.4–4)
TSH SERPL DL<=0.005 MIU/L-ACNC: 4.34 UIU/ML (ref 0.4–4)

## 2022-03-04 PROCEDURE — 80061 LIPID PANEL: CPT | Performed by: PHYSICIAN ASSISTANT

## 2022-03-04 PROCEDURE — 86376 MICROSOMAL ANTIBODY EACH: CPT | Performed by: PHYSICIAN ASSISTANT

## 2022-03-04 PROCEDURE — 80053 COMPREHEN METABOLIC PANEL: CPT | Performed by: PHYSICIAN ASSISTANT

## 2022-03-04 PROCEDURE — 83036 HEMOGLOBIN GLYCOSYLATED A1C: CPT | Performed by: PHYSICIAN ASSISTANT

## 2022-03-04 PROCEDURE — 84443 ASSAY THYROID STIM HORMONE: CPT | Performed by: PHYSICIAN ASSISTANT

## 2022-03-04 PROCEDURE — 82308 ASSAY OF CALCITONIN: CPT | Performed by: PHYSICIAN ASSISTANT

## 2022-03-04 PROCEDURE — 84439 ASSAY OF FREE THYROXINE: CPT | Performed by: PHYSICIAN ASSISTANT

## 2022-03-06 ENCOUNTER — PATIENT MESSAGE (OUTPATIENT)
Dept: ENDOCRINOLOGY | Facility: CLINIC | Age: 47
End: 2022-03-06
Payer: COMMERCIAL

## 2022-03-07 ENCOUNTER — PATIENT OUTREACH (OUTPATIENT)
Dept: ADMINISTRATIVE | Facility: OTHER | Age: 47
End: 2022-03-07
Payer: COMMERCIAL

## 2022-03-07 LAB — THYROPEROXIDASE IGG SERPL-ACNC: 410.2 IU/ML

## 2022-03-07 NOTE — PROGRESS NOTES
Chart was reviewed for overdue [facility]counters ([facility])  topics  Updates were unable to be requested from care everywhere  Health Maintenance has been updated  LINKS immunization registry triggered

## 2022-03-08 LAB — CALCIT SERPL-MCNC: <5 PG/ML

## 2022-03-09 ENCOUNTER — OFFICE VISIT (OUTPATIENT)
Dept: ENDOCRINOLOGY | Facility: CLINIC | Age: 47
End: 2022-03-09
Payer: COMMERCIAL

## 2022-03-09 VITALS
SYSTOLIC BLOOD PRESSURE: 126 MMHG | HEART RATE: 67 BPM | WEIGHT: 293 LBS | BODY MASS INDEX: 45.99 KG/M2 | DIASTOLIC BLOOD PRESSURE: 78 MMHG | TEMPERATURE: 98 F | HEIGHT: 67 IN | OXYGEN SATURATION: 96 %

## 2022-03-09 DIAGNOSIS — E04.1 NODULAR THYROID DISEASE: ICD-10-CM

## 2022-03-09 DIAGNOSIS — E03.8 HYPOTHYROIDISM DUE TO HASHIMOTO'S THYROIDITIS: ICD-10-CM

## 2022-03-09 DIAGNOSIS — E06.3 HYPOTHYROIDISM DUE TO HASHIMOTO'S THYROIDITIS: ICD-10-CM

## 2022-03-09 DIAGNOSIS — E78.5 HYPERLIPIDEMIA, UNSPECIFIED HYPERLIPIDEMIA TYPE: ICD-10-CM

## 2022-03-09 DIAGNOSIS — E11.69 TYPE 2 DIABETES MELLITUS WITH OTHER SPECIFIED COMPLICATION, WITHOUT LONG-TERM CURRENT USE OF INSULIN: Primary | ICD-10-CM

## 2022-03-09 DIAGNOSIS — E01.0 THYROMEGALY: ICD-10-CM

## 2022-03-09 PROCEDURE — 99999 PR PBB SHADOW E&M-EST. PATIENT-LVL IV: CPT | Mod: PBBFAC,,, | Performed by: PHYSICIAN ASSISTANT

## 2022-03-09 PROCEDURE — 3078F DIAST BP <80 MM HG: CPT | Mod: CPTII,S$GLB,, | Performed by: PHYSICIAN ASSISTANT

## 2022-03-09 PROCEDURE — 99214 PR OFFICE/OUTPT VISIT, EST, LEVL IV, 30-39 MIN: ICD-10-PCS | Mod: S$GLB,,, | Performed by: PHYSICIAN ASSISTANT

## 2022-03-09 PROCEDURE — 1159F PR MEDICATION LIST DOCUMENTED IN MEDICAL RECORD: ICD-10-PCS | Mod: CPTII,S$GLB,, | Performed by: PHYSICIAN ASSISTANT

## 2022-03-09 PROCEDURE — 3074F PR MOST RECENT SYSTOLIC BLOOD PRESSURE < 130 MM HG: ICD-10-PCS | Mod: CPTII,S$GLB,, | Performed by: PHYSICIAN ASSISTANT

## 2022-03-09 PROCEDURE — 3078F PR MOST RECENT DIASTOLIC BLOOD PRESSURE < 80 MM HG: ICD-10-PCS | Mod: CPTII,S$GLB,, | Performed by: PHYSICIAN ASSISTANT

## 2022-03-09 PROCEDURE — 99214 OFFICE O/P EST MOD 30 MIN: CPT | Mod: S$GLB,,, | Performed by: PHYSICIAN ASSISTANT

## 2022-03-09 PROCEDURE — 3008F BODY MASS INDEX DOCD: CPT | Mod: CPTII,S$GLB,, | Performed by: PHYSICIAN ASSISTANT

## 2022-03-09 PROCEDURE — 3044F PR MOST RECENT HEMOGLOBIN A1C LEVEL <7.0%: ICD-10-PCS | Mod: CPTII,S$GLB,, | Performed by: PHYSICIAN ASSISTANT

## 2022-03-09 PROCEDURE — 3074F SYST BP LT 130 MM HG: CPT | Mod: CPTII,S$GLB,, | Performed by: PHYSICIAN ASSISTANT

## 2022-03-09 PROCEDURE — 3044F HG A1C LEVEL LT 7.0%: CPT | Mod: CPTII,S$GLB,, | Performed by: PHYSICIAN ASSISTANT

## 2022-03-09 PROCEDURE — 1159F MED LIST DOCD IN RCRD: CPT | Mod: CPTII,S$GLB,, | Performed by: PHYSICIAN ASSISTANT

## 2022-03-09 PROCEDURE — 3008F PR BODY MASS INDEX (BMI) DOCUMENTED: ICD-10-PCS | Mod: CPTII,S$GLB,, | Performed by: PHYSICIAN ASSISTANT

## 2022-03-09 PROCEDURE — 99999 PR PBB SHADOW E&M-EST. PATIENT-LVL IV: ICD-10-PCS | Mod: PBBFAC,,, | Performed by: PHYSICIAN ASSISTANT

## 2022-03-09 RX ORDER — EZETIMIBE 10 MG/1
10 TABLET ORAL DAILY
Qty: 90 TABLET | Refills: 3 | Status: SHIPPED | OUTPATIENT
Start: 2022-03-09 | End: 2023-03-29 | Stop reason: SDUPTHER

## 2022-03-09 RX ORDER — SEMAGLUTIDE 1.34 MG/ML
0.5 INJECTION, SOLUTION SUBCUTANEOUS
Qty: 1 PEN | Refills: 11 | Status: SHIPPED | OUTPATIENT
Start: 2022-03-09 | End: 2022-04-27

## 2022-03-09 RX ORDER — SEMAGLUTIDE 1.34 MG/ML
0.5 INJECTION, SOLUTION SUBCUTANEOUS
Qty: 1 PEN | Refills: 11 | Status: SHIPPED | OUTPATIENT
Start: 2022-03-09 | End: 2022-03-09 | Stop reason: SDUPTHER

## 2022-03-09 RX ORDER — LEVOTHYROXINE SODIUM 88 UG/1
88 TABLET ORAL
Qty: 30 TABLET | Refills: 11 | Status: SHIPPED | OUTPATIENT
Start: 2022-03-09 | End: 2022-11-09

## 2022-03-09 NOTE — PROGRESS NOTES
CC: This 47 y.o. female presents for management of Diabetes Mellitus  and chronic conditions pending review including HTN, HLP    HPI: was diagnosed with T2DM in 2017. Her son  from suicide in 10/21. Arrives with her , Maldonado.  Has never been hospitalized r/t DM.  Family hx of DM: grandmother  Fhx of thyroid disease:her sister has Hashimoto's Disease  Denies missing doses of DM medication.   hypoglycemia at home: none   monitoring BG at home:  Fastins    Pt was started on Lipitor last visit but experienced side effects.   She could not remember the side effect.    Diet: 3 meals daily.   BF-yogourt w/ berries,   Snack:egg bites (kale and spinach), bagel  W/ cream cheese  LH: Immunome green bowl (true foods).  DN: chicken nachos  No snacks. Avoids sugary beverages. Drinks protein shake in the morning.    Exercise: None     CURRENT DM MEDS: Ozempic 0.25 mg weekly  Previous meds:  Metformin-constipation    Standards of Care:  Eye exam: Last year-[facility]-macular degeneration  Podiatry exam:   DE: 10/21 [facility]    Hypothyroidism  Taking 75mcg qd.   Dx in .   No hx of neck radiation.   + fatigue, heat intolerance, sweating, occ constipation.   No hair loss, cold intolerance, tremors. Cycles are irregular. She does have the mirena.    Nodular Thyroid Disease  Thyroid u/s  shows tow 2 cm nodules in the left thyroid and a 1.4 cm nodule in the right with macrocalcifications. No sob, dysphagia, voice changes.     She is taking vitamin d and zinc.  PMHx, PSHx: reviewed in epic.  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    Wt Readings from Last 10 Encounters:   22 (!) 189.5 kg (417 lb 12.4 oz)   22 (!) 187.3 kg (412 lb 14.8 oz)   21 (!) 184.3 kg (406 lb 4.9 oz)   21 (!) 185.4 kg (408 lb 11.7 oz)   10/19/21 (!) 192 kg (423 lb 4.5 oz)   10/12/21 (!) 192 kg (423 lb 4.5 oz)   21 (!) 194.6 kg (429 lb 0.2 oz)   21 (!) 194.7 kg (429 lb 5.5 oz)   21  "(!) 192.8 kg (425 lb 0.8 oz)   03/29/21 (!) 195.1 kg (430 lb 1.9 oz)      ROS:   Gen: Appetite good, + wt loss (17 lbs since 7/21), denies fatigue and weakness.  Skin: Skin is intact and heals well, no rashes, no hair changes  Eyes: Denies visual disturbances  Resp: no SOB or ANGELES, no cough  Cardiac: No palpitations, chest pain, no edema   GI: No nausea or vomiting, diarrhea, constipation, or abdominal pain.  /GYN: No nocturia, burning or pain.   MS/Neuro: Denies numbness/ tingling in BLE; Gait steady, speech clear  Psych: Denies drug/ETOH abuse, no hx of depression.  Other systems: negative.    /78 (BP Location: Left arm, Patient Position: Sitting, BP Method: Small (Manual))   Pulse 67   Temp 98.4 °F (36.9 °C) (Oral)   Ht 5' 7" (1.702 m)   Wt (!) 189.5 kg (417 lb 12.4 oz)   SpO2 96%   BMI 65.43 kg/m²      PE:  GENERAL: middle aged female, well developed, well nourished.  PSYCH: AAOx3, appropriate mood and affect, pleasant expression, conversant, appears relaxed, well groomed.   EYES: Conjunctiva, corneas clear  NECK: Supple, trachea midline, thyromegaly. + AN  CHEST: Resp even and unlabored, CTA bilateral.  CARDIAC: RRR, S1, S2 heard, no murmurs  VASCULAR: DP pulses +2/4 bilaterally, right ankle edema.  NEURO: Gait steady, CN ll-Xll grossly intact.  SKIN: Skin warm and dry no acanthosis nigracans.  8/21  Foot Exam: no sores or macerations noted.     Protective Sensation (w/ 10 gram monofilament):  Right: Intact  Left: Intact    Visual Inspection:  Normal -  Bilateral and Nails Intact - without Evidence of Foot Deformity- Bilateral    Pedal Pulses:   Right: Present  Left: Present    Posterior tibialis:   Right:Present  Left: Present     Vibratory Sensation  Right:Positive  Left:Positive     Personally reviewed labs below:    Lab Visit on 03/04/2022   Component Date Value Ref Range Status    TSH 03/04/2022 4.340 (A) 0.400 - 4.000 uIU/mL Final    Free T4 03/04/2022 0.80  0.71 - 1.51 ng/dL Final    " Cholesterol 03/04/2022 193  120 - 199 mg/dL Final    Comment: The National Cholesterol Education Program (NCEP) has set the  following guidelines (reference ranges) for Cholesterol:  Optimal.....................<200 mg/dL  Borderline High.............200-239 mg/dL  High........................> or = 240 mg/dL      Triglycerides 03/04/2022 64  30 - 150 mg/dL Final    Comment: The National Cholesterol Education Program (NCEP) has set the  following guidelines (reference values) for triglycerides:  Normal......................<150 mg/dL  Borderline High.............150-199 mg/dL  High........................200-499 mg/dL      HDL 03/04/2022 56  40 - 75 mg/dL Final    Comment: The National Cholesterol Education Program (NCEP) has set the  following guidelines (reference values) for HDL Cholesterol:  Low...............<40 mg/dL  Optimal...........>60 mg/dL      LDL Cholesterol 03/04/2022 124.2  63.0 - 159.0 mg/dL Final    Comment: The National Cholesterol Education Program (NCEP) has set the  following guidelines (reference values) for LDL Cholesterol:  Optimal.......................<130 mg/dL  Borderline High...............130-159 mg/dL  High..........................160-189 mg/dL  Very High.....................>190 mg/dL      HDL/Cholesterol Ratio 03/04/2022 29.0  20.0 - 50.0 % Final    Total Cholesterol/HDL Ratio 03/04/2022 3.4  2.0 - 5.0 Final    Non-HDL Cholesterol 03/04/2022 137  mg/dL Final    Comment: Risk category and Non-HDL cholesterol goals:  Coronary heart disease (CHD)or equivalent (10-year risk of CHD >20%):  Non-HDL cholesterol goal     <130 mg/dL  Two or more CHD risk factors and 10-year risk of CHD <= 20%:  Non-HDL cholesterol goal     <160 mg/dL  0 to 1 CHD risk factor:  Non-HDL cholesterol goal     <190 mg/dL      Hemoglobin A1C 03/04/2022 6.3 (A) 4.0 - 5.6 % Final    Comment: ADA Screening Guidelines:  5.7-6.4%  Consistent with prediabetes  >or=6.5%  Consistent with diabetes    High levels of  fetal hemoglobin interfere with the HbA1C  assay. Heterozygous hemoglobin variants (HbS, HgC, etc)do  not significantly interfere with this assay.   However, presence of multiple variants may affect accuracy.      Estimated Avg Glucose 03/04/2022 134 (A) 68 - 131 mg/dL Final    Sodium 03/04/2022 138  136 - 145 mmol/L Final    Potassium 03/04/2022 4.6  3.5 - 5.1 mmol/L Final    Chloride 03/04/2022 102  95 - 110 mmol/L Final    CO2 03/04/2022 25  23 - 29 mmol/L Final    Glucose 03/04/2022 122 (A) 70 - 110 mg/dL Final    BUN 03/04/2022 12  6 - 20 mg/dL Final    Creatinine 03/04/2022 0.7  0.5 - 1.4 mg/dL Final    Calcium 03/04/2022 9.4  8.7 - 10.5 mg/dL Final    Total Protein 03/04/2022 7.3  6.0 - 8.4 g/dL Final    Albumin 03/04/2022 3.4 (A) 3.5 - 5.2 g/dL Final    Total Bilirubin 03/04/2022 0.4  0.1 - 1.0 mg/dL Final    Comment: For infants and newborns, interpretation of results should be based  on gestational age, weight and in agreement with clinical  observations.    Premature Infant recommended reference ranges:  Up to 24 hours.............<8.0 mg/dL  Up to 48 hours............<12.0 mg/dL  3-5 days..................<15.0 mg/dL  6-29 days.................<15.0 mg/dL      Alkaline Phosphatase 03/04/2022 58  55 - 135 U/L Final    AST 03/04/2022 14  10 - 40 U/L Final    ALT 03/04/2022 19  10 - 44 U/L Final    Anion Gap 03/04/2022 11  8 - 16 mmol/L Final    eGFR if African American 03/04/2022 >60.0  >60 mL/min/1.73 m^2 Final    eGFR if non African American 03/04/2022 >60.0  >60 mL/min/1.73 m^2 Final    Comment: Calculation used to obtain the estimated glomerular filtration  rate (eGFR) is the CKD-EPI equation.       TSH 03/04/2022 4.340 (A) 0.400 - 4.000 uIU/mL Final    Free T4 03/04/2022 0.80  0.71 - 1.51 ng/dL Final    Thyroperoxidase Antibodies 03/04/2022 410.2 (A) <6.0 IU/mL Final    Calcitonin 03/04/2022 <5.0  <=7.6 pg/mL Final    Comment: -------------------ADDITIONAL  INFORMATION-------------------  The testing method is an electrochemiluminescence   assay manufactured by [employer] and   performed on the [place] system.     Values obtained with different assay methods or kits   may be different and cannot be used interchangeably.    Test results cannot be interpreted as absolute evidence   for the presence or absence of malignant disease.    Test Performed by:  [facility]  [address] [place] [address]  : Torres Pfeiffer M.D. Ph.D.; CLIA# [id number]           ASSESSMENT and PLAN:    1. Type 2 diabetes mellitus with other specified complication, without long-term current use of insulin  semaglutide (OZEMPIC) 0.25 mg or 0.5 mg(2 mg/1.5 mL) pen injector    DISCONTINUED: semaglutide (OZEMPIC) 0.25 mg or 0.5 mg(2 mg/1.5 mL) pen injector   2. Hyperlipidemia, unspecified hyperlipidemia type  Comprehensive Metabolic Panel    Hemoglobin A1C    Lipid Panel    ezetimibe (ZETIA) 10 mg tablet   3. Nodular thyroid disease     4. Hypothyroidism due to Hashimoto's thyroiditis  levothyroxine (SYNTHROID) 88 MCG tablet    TSH   5. Thyromegaly        T2DM with hyperglycemia-A1c is below goal. Increase Ozempic to 0.5 mg weekly. No fhx of MEN or thyroid cancer. No hx of pancreatitis. This will prevent further increases in her glucoses and will help her lose weight. She did not tolerate Metformin in the past.  Discussed DM, progression of disease, long term complications, tx options.   Discussed A1c and BG goals.   Reviewed  hypoglycemia, s/s and appropriate tx.   Instructed to monitor BG and bring meter/ log to every clinic visit.   - takes ASA, ACEi,     HLP -elevated LDL ,  LFTs WNL. Start zetia 10 mg daily.  Hypothyroidism-TSH is elevated. Increase Levothyroxine to 88 mcg daily. Recheck in six weeks. Start Selenium 200 mcg qd.    Iron deficiency-may falsely lower a1c.  Obesity-Body mass index is 65.43 kg/m². Increase  exercise to 30 min daily. Decrease carbohydrates in diet. Pt plans to meal prep.    TSH in six weeks  Follow-up: in 4 months with lab prior

## 2022-03-09 NOTE — PATIENT INSTRUCTIONS
Increase Levothyroxine to 88 mcg daily. Start Selenium 200 mcg daily. Increase Ozempic to 0.5 mg weekly.

## 2022-03-15 ENCOUNTER — HOSPITAL ENCOUNTER (OUTPATIENT)
Dept: RADIOLOGY | Facility: HOSPITAL | Age: 47
Discharge: HOME | End: 2022-03-15
Attending: PHYSICIAN ASSISTANT
Payer: COMMERCIAL

## 2022-03-15 ENCOUNTER — PATIENT OUTREACH (OUTPATIENT)
Dept: ADMINISTRATIVE | Facility: HOSPITAL | Age: 47
End: 2022-03-15
Payer: COMMERCIAL

## 2022-03-15 DIAGNOSIS — E04.1 NODULAR THYROID DISEASE: ICD-10-CM

## 2022-03-15 PROCEDURE — 10005 US FINE NEEDLE ASPIRATION THYROID, FIRST LESION: ICD-10-PCS | Mod: ,,, | Performed by: RADIOLOGY

## 2022-03-15 PROCEDURE — 88173 CYTOPATH EVAL FNA REPORT: CPT | Mod: 59 | Performed by: STUDENT IN AN ORGANIZED HEALTH CARE EDUCATION/TRAINING PROGRAM

## 2022-03-15 PROCEDURE — 88173 PR  INTERPRETATION OF FNA SMEAR: ICD-10-PCS | Mod: 26,,, | Performed by: STUDENT IN AN ORGANIZED HEALTH CARE EDUCATION/TRAINING PROGRAM

## 2022-03-15 PROCEDURE — 10006 FNA BX W/US GDN EA ADDL: CPT

## 2022-03-15 PROCEDURE — 10006 FNA BX W/US GDN EA ADDL: CPT | Mod: ,,, | Performed by: RADIOLOGY

## 2022-03-15 PROCEDURE — 10006 US FINE NEEDLE ASPIRATION THYROID EA ADDITIONAL LESION: ICD-10-PCS | Mod: ,,, | Performed by: RADIOLOGY

## 2022-03-15 PROCEDURE — 88173 CYTOPATH EVAL FNA REPORT: CPT | Mod: 26,,, | Performed by: STUDENT IN AN ORGANIZED HEALTH CARE EDUCATION/TRAINING PROGRAM

## 2022-03-15 PROCEDURE — 10005 FNA BX W/US GDN 1ST LES: CPT

## 2022-03-15 PROCEDURE — 10005 FNA BX W/US GDN 1ST LES: CPT | Mod: ,,, | Performed by: RADIOLOGY

## 2022-03-15 NOTE — PROGRESS NOTES
Diabetic eye exam GAP report-I spoke to pt regarding her overdue DIabetic eye exam. Pt stated she will call back to schedule.

## 2022-03-16 DIAGNOSIS — Z12.31 OTHER SCREENING MAMMOGRAM: ICD-10-CM

## 2022-03-18 ENCOUNTER — PATIENT MESSAGE (OUTPATIENT)
Dept: ADMINISTRATIVE | Facility: HOSPITAL | Age: 47
End: 2022-03-18
Payer: COMMERCIAL

## 2022-03-21 ENCOUNTER — PATIENT MESSAGE (OUTPATIENT)
Dept: ADMINISTRATIVE | Facility: HOSPITAL | Age: 47
End: 2022-03-21
Payer: COMMERCIAL

## 2022-03-21 LAB
COMMENT: ABNORMAL
FINAL PATHOLOGIC DIAGNOSIS: ABNORMAL
Lab: ABNORMAL

## 2022-03-24 ENCOUNTER — PATIENT MESSAGE (OUTPATIENT)
Dept: ENDOCRINOLOGY | Facility: CLINIC | Age: 47
End: 2022-03-24
Payer: COMMERCIAL

## 2022-03-24 DIAGNOSIS — E04.1 NODULAR THYROID DISEASE: Primary | ICD-10-CM

## 2022-04-26 ENCOUNTER — PATIENT MESSAGE (OUTPATIENT)
Dept: ENDOCRINOLOGY | Facility: CLINIC | Age: 47
End: 2022-04-26
Payer: COMMERCIAL

## 2022-04-26 DIAGNOSIS — E11.69 TYPE 2 DIABETES MELLITUS WITH OTHER SPECIFIED COMPLICATION, WITHOUT LONG-TERM CURRENT USE OF INSULIN: Primary | ICD-10-CM

## 2022-04-27 RX ORDER — DULAGLUTIDE 0.75 MG/.5ML
0.75 INJECTION, SOLUTION SUBCUTANEOUS
Qty: 4 PEN | Refills: 11 | Status: SHIPPED | OUTPATIENT
Start: 2022-04-27 | End: 2022-07-08

## 2022-04-28 ENCOUNTER — TELEPHONE (OUTPATIENT)
Dept: PHARMACY | Facility: CLINIC | Age: 47
End: 2022-04-28
Payer: COMMERCIAL

## 2022-05-31 ENCOUNTER — PATIENT MESSAGE (OUTPATIENT)
Dept: ADMINISTRATIVE | Facility: HOSPITAL | Age: 47
End: 2022-05-31
Payer: COMMERCIAL

## 2022-06-19 DIAGNOSIS — F41.9 ANXIETY AND DEPRESSION: ICD-10-CM

## 2022-06-19 DIAGNOSIS — F32.A ANXIETY AND DEPRESSION: ICD-10-CM

## 2022-06-20 RX ORDER — ESCITALOPRAM OXALATE 20 MG/1
TABLET ORAL
Qty: 90 TABLET | Refills: 0 | Status: SHIPPED | OUTPATIENT
Start: 2022-06-20 | End: 2022-09-19

## 2022-07-01 ENCOUNTER — LAB VISIT (OUTPATIENT)
Dept: LAB | Facility: HOSPITAL | Age: 47
End: 2022-07-01
Attending: PHYSICIAN ASSISTANT
Payer: COMMERCIAL

## 2022-07-01 DIAGNOSIS — E78.5 HYPERLIPIDEMIA, UNSPECIFIED HYPERLIPIDEMIA TYPE: ICD-10-CM

## 2022-07-01 LAB
ALBUMIN SERPL BCP-MCNC: 3.6 G/DL (ref 3.5–5.2)
ALP SERPL-CCNC: 59 U/L (ref 55–135)
ALT SERPL W/O P-5'-P-CCNC: 17 U/L (ref 10–44)
ANION GAP SERPL CALC-SCNC: 10 MMOL/L (ref 8–16)
AST SERPL-CCNC: 16 U/L (ref 10–40)
BILIRUB SERPL-MCNC: 0.4 MG/DL (ref 0.1–1)
BUN SERPL-MCNC: 13 MG/DL (ref 6–20)
CALCIUM SERPL-MCNC: 9.5 MG/DL (ref 8.7–10.5)
CHLORIDE SERPL-SCNC: 103 MMOL/L (ref 95–110)
CHOLEST SERPL-MCNC: 192 MG/DL (ref 120–199)
CHOLEST/HDLC SERPL: 3.4 {RATIO} (ref 2–5)
CO2 SERPL-SCNC: 27 MMOL/L (ref 23–29)
CREAT SERPL-MCNC: 0.9 MG/DL (ref 0.5–1.4)
EST. GFR  (AFRICAN AMERICAN): >60 ML/MIN/1.73 M^2
EST. GFR  (NON AFRICAN AMERICAN): >60 ML/MIN/1.73 M^2
ESTIMATED AVG GLUCOSE: 143 MG/DL (ref 68–131)
GLUCOSE SERPL-MCNC: 135 MG/DL (ref 70–110)
HBA1C MFR BLD: 6.6 % (ref 4–5.6)
HDLC SERPL-MCNC: 57 MG/DL (ref 40–75)
HDLC SERPL: 29.7 % (ref 20–50)
LDLC SERPL CALC-MCNC: 118.8 MG/DL (ref 63–159)
NONHDLC SERPL-MCNC: 135 MG/DL
POTASSIUM SERPL-SCNC: 4.4 MMOL/L (ref 3.5–5.1)
PROT SERPL-MCNC: 7.5 G/DL (ref 6–8.4)
SODIUM SERPL-SCNC: 140 MMOL/L (ref 136–145)
TRIGL SERPL-MCNC: 81 MG/DL (ref 30–150)

## 2022-07-01 PROCEDURE — 83036 HEMOGLOBIN GLYCOSYLATED A1C: CPT | Performed by: PHYSICIAN ASSISTANT

## 2022-07-01 PROCEDURE — 80061 LIPID PANEL: CPT | Performed by: PHYSICIAN ASSISTANT

## 2022-07-01 PROCEDURE — 80053 COMPREHEN METABOLIC PANEL: CPT | Performed by: PHYSICIAN ASSISTANT

## 2022-07-01 PROCEDURE — 36415 COLL VENOUS BLD VENIPUNCTURE: CPT | Mod: PO | Performed by: PHYSICIAN ASSISTANT

## 2022-07-05 ENCOUNTER — PATIENT MESSAGE (OUTPATIENT)
Dept: OBSTETRICS AND GYNECOLOGY | Facility: CLINIC | Age: 47
End: 2022-07-05
Payer: COMMERCIAL

## 2022-07-08 ENCOUNTER — OFFICE VISIT (OUTPATIENT)
Dept: ENDOCRINOLOGY | Facility: CLINIC | Age: 47
End: 2022-07-08
Payer: COMMERCIAL

## 2022-07-08 VITALS
HEART RATE: 73 BPM | SYSTOLIC BLOOD PRESSURE: 122 MMHG | TEMPERATURE: 99 F | BODY MASS INDEX: 45.99 KG/M2 | WEIGHT: 293 LBS | HEIGHT: 67 IN | DIASTOLIC BLOOD PRESSURE: 80 MMHG | OXYGEN SATURATION: 97 %

## 2022-07-08 DIAGNOSIS — E78.5 HYPERLIPIDEMIA, UNSPECIFIED HYPERLIPIDEMIA TYPE: ICD-10-CM

## 2022-07-08 DIAGNOSIS — E04.1 NODULAR THYROID DISEASE: ICD-10-CM

## 2022-07-08 DIAGNOSIS — E03.8 HYPOTHYROIDISM DUE TO HASHIMOTO'S THYROIDITIS: ICD-10-CM

## 2022-07-08 DIAGNOSIS — E61.1 IRON DEFICIENCY: ICD-10-CM

## 2022-07-08 DIAGNOSIS — E03.9 HYPOTHYROIDISM, UNSPECIFIED TYPE: ICD-10-CM

## 2022-07-08 DIAGNOSIS — E11.69 TYPE 2 DIABETES MELLITUS WITH OTHER SPECIFIED COMPLICATION, WITHOUT LONG-TERM CURRENT USE OF INSULIN: Primary | ICD-10-CM

## 2022-07-08 DIAGNOSIS — M10.9 GOUT, UNSPECIFIED CAUSE, UNSPECIFIED CHRONICITY, UNSPECIFIED SITE: ICD-10-CM

## 2022-07-08 DIAGNOSIS — E06.3 HYPOTHYROIDISM DUE TO HASHIMOTO'S THYROIDITIS: ICD-10-CM

## 2022-07-08 PROCEDURE — 99999 PR PBB SHADOW E&M-EST. PATIENT-LVL V: CPT | Mod: PBBFAC,,, | Performed by: PHYSICIAN ASSISTANT

## 2022-07-08 PROCEDURE — 3079F DIAST BP 80-89 MM HG: CPT | Mod: CPTII,S$GLB,, | Performed by: PHYSICIAN ASSISTANT

## 2022-07-08 PROCEDURE — 3079F PR MOST RECENT DIASTOLIC BLOOD PRESSURE 80-89 MM HG: ICD-10-PCS | Mod: CPTII,S$GLB,, | Performed by: PHYSICIAN ASSISTANT

## 2022-07-08 PROCEDURE — 3074F PR MOST RECENT SYSTOLIC BLOOD PRESSURE < 130 MM HG: ICD-10-PCS | Mod: CPTII,S$GLB,, | Performed by: PHYSICIAN ASSISTANT

## 2022-07-08 PROCEDURE — 3008F BODY MASS INDEX DOCD: CPT | Mod: CPTII,S$GLB,, | Performed by: PHYSICIAN ASSISTANT

## 2022-07-08 PROCEDURE — 3044F PR MOST RECENT HEMOGLOBIN A1C LEVEL <7.0%: ICD-10-PCS | Mod: CPTII,S$GLB,, | Performed by: PHYSICIAN ASSISTANT

## 2022-07-08 PROCEDURE — 3074F SYST BP LT 130 MM HG: CPT | Mod: CPTII,S$GLB,, | Performed by: PHYSICIAN ASSISTANT

## 2022-07-08 PROCEDURE — 99999 PR PBB SHADOW E&M-EST. PATIENT-LVL V: ICD-10-PCS | Mod: PBBFAC,,, | Performed by: PHYSICIAN ASSISTANT

## 2022-07-08 PROCEDURE — 99214 OFFICE O/P EST MOD 30 MIN: CPT | Mod: S$GLB,,, | Performed by: PHYSICIAN ASSISTANT

## 2022-07-08 PROCEDURE — 1160F PR REVIEW ALL MEDS BY PRESCRIBER/CLIN PHARMACIST DOCUMENTED: ICD-10-PCS | Mod: CPTII,S$GLB,, | Performed by: PHYSICIAN ASSISTANT

## 2022-07-08 PROCEDURE — 1159F PR MEDICATION LIST DOCUMENTED IN MEDICAL RECORD: ICD-10-PCS | Mod: CPTII,S$GLB,, | Performed by: PHYSICIAN ASSISTANT

## 2022-07-08 PROCEDURE — 1159F MED LIST DOCD IN RCRD: CPT | Mod: CPTII,S$GLB,, | Performed by: PHYSICIAN ASSISTANT

## 2022-07-08 PROCEDURE — 3044F HG A1C LEVEL LT 7.0%: CPT | Mod: CPTII,S$GLB,, | Performed by: PHYSICIAN ASSISTANT

## 2022-07-08 PROCEDURE — 1160F RVW MEDS BY RX/DR IN RCRD: CPT | Mod: CPTII,S$GLB,, | Performed by: PHYSICIAN ASSISTANT

## 2022-07-08 PROCEDURE — 99214 PR OFFICE/OUTPT VISIT, EST, LEVL IV, 30-39 MIN: ICD-10-PCS | Mod: S$GLB,,, | Performed by: PHYSICIAN ASSISTANT

## 2022-07-08 PROCEDURE — 3008F PR BODY MASS INDEX (BMI) DOCUMENTED: ICD-10-PCS | Mod: CPTII,S$GLB,, | Performed by: PHYSICIAN ASSISTANT

## 2022-07-08 RX ORDER — ALLOPURINOL 100 MG/1
100 TABLET ORAL DAILY
Qty: 30 TABLET | Refills: 5 | Status: SHIPPED | OUTPATIENT
Start: 2022-07-08 | End: 2023-01-04 | Stop reason: SDUPTHER

## 2022-07-08 NOTE — PROGRESS NOTES
CC: This 47 y.o. female presents for management of Diabetes Mellitus  and chronic conditions pending review including HTN, HLP.    HPI: was diagnosed with T2DM in 2017. Her son  from suicide in 10/21. Arrives with her , Maldonado.  Has never been hospitalized r/t DM.  Family hx of DM: grandmother  Fhx of thyroid disease:her sister has Hashimoto's Disease  Denies missing doses of DM medication.   hypoglycemia at home: none   monitoring BG at home:  Fastins    Pt was started on Lipitor last visit but experienced side effects.   She could not remember the side effect.    Diet: 3 meals daily.   BF-banana, mk yogurt  Snack:egg bites (kale and spinach), bagel  W/ cream cheese  LH: lean cuisine  DN: chicken nachos  No snacks. Avoids sugary beverages. Drinks protein shake in the morning.    Exercise: None     CURRENT DM MEDS: Trulicity 0.75 mg weekly  Previous meds:  Metformin-constipation    Standards of Care:  Eye exam: Last year-[facility]-macular degeneration  Podiatry exam:   DE: 10/21 C. [facility]    Hypothyroidism  Taking 88 mcg qd.   Dx in .   No hx of neck radiation.   + fatigue, sweating, occ constipation.   No hair loss, heat/cold intolerance, tremors. Cycles are irregular. She does have the mirena.    Nodular Thyroid Disease  Thyroid u/s  shows tow 2 cm nodules in the left thyroid and a 1.4 cm nodule in the right with macrocalcifications. No sob, dysphagia or voice changes. FNAs of the right middle nodule and left upper nodule resulted in FLUS.    She is taking vitamin d and zinc.  PMHx, PSHx: reviewed in epic.  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    Wt Readings from Last 10 Encounters:   22 (!) 191 kg (421 lb 1.3 oz)   22 (!) 189.5 kg (417 lb 12.4 oz)   22 (!) 187.3 kg (412 lb 14.8 oz)   21 (!) 184.3 kg (406 lb 4.9 oz)   21 (!) 185.4 kg (408 lb 11.7 oz)   10/19/21 (!) 192 kg (423 lb 4.5 oz)   10/12/21 (!) 192 kg (423 lb 4.5 oz)  "  08/18/21 (!) 194.6 kg (429 lb 0.2 oz)   08/03/21 (!) 194.7 kg (429 lb 5.5 oz)   07/01/21 (!) 192.8 kg (425 lb 0.8 oz)      ROS:   Gen: Appetite good, + wt gain (14 lbs), denies fatigue and weakness.  Skin: Skin is intact and heals well, no rashes, no hair changes  Eyes: Denies visual disturbances  Resp: no SOB or ANGELES, no cough  Cardiac: No palpitations, chest pain, no edema   GI: No nausea or vomiting, diarrhea, constipation, or abdominal pain.  /GYN: No nocturia, burning or pain.   MS/Neuro: Denies numbness/ tingling in BLE; Gait steady, speech clear  Psych: Denies drug/ETOH abuse, no hx of depression.  Other systems: negative.    /80 (BP Location: Left arm, Patient Position: Sitting, BP Method: Large (Manual))   Pulse 73   Temp 98.9 °F (37.2 °C) (Oral)   Ht 5' 7" (1.702 m)   Wt (!) 191 kg (421 lb 1.3 oz)   SpO2 97%   BMI 65.95 kg/m²      PE:  GENERAL: middle aged female, well developed, well nourished.  PSYCH: AAOx3, appropriate mood and affect, pleasant expression, conversant, appears relaxed, well groomed.   EYES: Conjunctiva, corneas clear  NECK: Supple, trachea midline, thyromegaly. + AN  CHEST: Resp even and unlabored, CTA bilateral.  CARDIAC: RRR, S1, S2 heard, no murmurs  VASCULAR: DP pulses +2/4 bilaterally, right ankle edema.  NEURO: Gait steady, CN ll-Xll grossly intact.  SKIN: Skin warm and dry no acanthosis nigracans.  8/22  Foot Exam: no sores or macerations noted.     Protective Sensation (w/ 10 gram monofilament):  Right: Intact  Left: Intact    Visual Inspection:  Normal -  Bilateral and Nails Intact - without Evidence of Foot Deformity- Bilateral    Pedal Pulses:   Right: Present  Left: Present    Posterior tibialis:   Right:Present  Left: Present     Vibratory Sensation  Right:Positive  Left:Positive     Personally reviewed labs below:    Lab Visit on 07/01/2022   Component Date Value Ref Range Status    Sodium 07/01/2022 140  136 - 145 mmol/L Final    Potassium 07/01/2022 4.4  " 3.5 - 5.1 mmol/L Final    Chloride 07/01/2022 103  95 - 110 mmol/L Final    CO2 07/01/2022 27  23 - 29 mmol/L Final    Glucose 07/01/2022 135 (A) 70 - 110 mg/dL Final    BUN 07/01/2022 13  6 - 20 mg/dL Final    Creatinine 07/01/2022 0.9  0.5 - 1.4 mg/dL Final    Calcium 07/01/2022 9.5  8.7 - 10.5 mg/dL Final    Total Protein 07/01/2022 7.5  6.0 - 8.4 g/dL Final    Albumin 07/01/2022 3.6  3.5 - 5.2 g/dL Final    Total Bilirubin 07/01/2022 0.4  0.1 - 1.0 mg/dL Final    Comment: For infants and newborns, interpretation of results should be based  on gestational age, weight and in agreement with clinical  observations.    Premature Infant recommended reference ranges:  Up to 24 hours.............<8.0 mg/dL  Up to 48 hours............<12.0 mg/dL  3-5 days..................<15.0 mg/dL  6-29 days.................<15.0 mg/dL      Alkaline Phosphatase 07/01/2022 59  55 - 135 U/L Final    AST 07/01/2022 16  10 - 40 U/L Final    ALT 07/01/2022 17  10 - 44 U/L Final    Anion Gap 07/01/2022 10  8 - 16 mmol/L Final    eGFR if African American 07/01/2022 >60.0  >60 mL/min/1.73 m^2 Final    eGFR if non African American 07/01/2022 >60.0  >60 mL/min/1.73 m^2 Final    Comment: Calculation used to obtain the estimated glomerular filtration  rate (eGFR) is the CKD-EPI equation.       Hemoglobin A1C 07/01/2022 6.6 (A) 4.0 - 5.6 % Final    Comment: ADA Screening Guidelines:  5.7-6.4%  Consistent with prediabetes  >or=6.5%  Consistent with diabetes    High levels of fetal hemoglobin interfere with the HbA1C  assay. Heterozygous hemoglobin variants (HbS, HgC, etc)do  not significantly interfere with this assay.   However, presence of multiple variants may affect accuracy.      Estimated Avg Glucose 07/01/2022 143 (A) 68 - 131 mg/dL Final    Cholesterol 07/01/2022 192  120 - 199 mg/dL Final    Comment: The National Cholesterol Education Program (NCEP) has set the  following guidelines (reference ranges) for  Cholesterol:  Optimal.....................<200 mg/dL  Borderline High.............200-239 mg/dL  High........................> or = 240 mg/dL      Triglycerides 07/01/2022 81  30 - 150 mg/dL Final    Comment: The National Cholesterol Education Program (NCEP) has set the  following guidelines (reference values) for triglycerides:  Normal......................<150 mg/dL  Borderline High.............150-199 mg/dL  High........................200-499 mg/dL      HDL 07/01/2022 57  40 - 75 mg/dL Final    Comment: The National Cholesterol Education Program (NCEP) has set the  following guidelines (reference values) for HDL Cholesterol:  Low...............<40 mg/dL  Optimal...........>60 mg/dL      LDL Cholesterol 07/01/2022 118.8  63.0 - 159.0 mg/dL Final    Comment: The National Cholesterol Education Program (NCEP) has set the  following guidelines (reference values) for LDL Cholesterol:  Optimal.......................<130 mg/dL  Borderline High...............130-159 mg/dL  High..........................160-189 mg/dL  Very High.....................>190 mg/dL      HDL/Cholesterol Ratio 07/01/2022 29.7  20.0 - 50.0 % Final    Total Cholesterol/HDL Ratio 07/01/2022 3.4  2.0 - 5.0 Final    Non-HDL Cholesterol 07/01/2022 135  mg/dL Final    Comment: Risk category and Non-HDL cholesterol goals:  Coronary heart disease (CHD)or equivalent (10-year risk of CHD >20%):  Non-HDL cholesterol goal     <130 mg/dL  Two or more CHD risk factors and 10-year risk of CHD <= 20%:  Non-HDL cholesterol goal     <160 mg/dL  0 to 1 CHD risk factor:  Non-HDL cholesterol goal     <190 mg/dL           ASSESSMENT and PLAN:    1. Type 2 diabetes mellitus with other specified complication, without long-term current use of insulin  Ambulatory referral/consult to Ophthalmology    TSH    Hemoglobin A1C    Renal Function Panel    TSH    T4, Free    T4, Free    Lipid Panel    dulaglutide (TRULICITY) 1.5 mg/0.5 mL pen injector   2. Hyperlipidemia,  unspecified hyperlipidemia type     3. Hypothyroidism due to Hashimoto's thyroiditis     4. Nodular thyroid disease  US Soft Tissue Head Neck Thyroid   5. Iron deficiency     6. Hypothyroidism, unspecified type     7. Gout, unspecified cause, unspecified chronicity, unspecified site  allopurinoL (ZYLOPRIM) 100 MG tablet      T2DM with hyperglycemia-A1c is below goal. Increase Trulicity to 1.5 mg weekly. No fhx of MEN or thyroid cancer. No hx of pancreatitis. This will prevent further increases in her glucoses and will help her lose weight. She did not tolerate Metformin in the past. Discussed DM, progression of disease, long term complications, tx options.   Discussed A1c and BG goals.   Reviewed  hypoglycemia, s/s and appropriate tx.   Instructed to monitor BG and bring meter/ log to every clinic visit.   - takes ASA, ACEi,     HLP -elevated LDL ,  LFTs WNL. Continue zetia 10 mg daily. Declines statins.  Hypothyroidism-TFTs today. Continue LT4 dose. Start Selenium 200 mcg qd.  Nodular Thyroid disease-repeat thyroid u/s.  Iron deficiency-may falsely lower a1c.  Obesity-Body mass index is 65.95 kg/m². Increase exercise to 30 min daily. Decrease carbohydrates in diet. Pt plans to meal prep.    Referral to optho  Thyroid u/s  TSH,t4  Follow-up: in 4 months with lab prior

## 2022-07-19 ENCOUNTER — OFFICE VISIT (OUTPATIENT)
Dept: FAMILY MEDICINE | Facility: CLINIC | Age: 47
End: 2022-07-19
Payer: COMMERCIAL

## 2022-07-19 DIAGNOSIS — E06.3 HYPOTHYROIDISM DUE TO HASHIMOTO'S THYROIDITIS: ICD-10-CM

## 2022-07-19 DIAGNOSIS — F43.21 GRIEF: ICD-10-CM

## 2022-07-19 DIAGNOSIS — E03.8 HYPOTHYROIDISM DUE TO HASHIMOTO'S THYROIDITIS: ICD-10-CM

## 2022-07-19 DIAGNOSIS — E66.01 MORBID OBESITY WITH BMI OF 60.0-69.9, ADULT: Primary | ICD-10-CM

## 2022-07-19 PROCEDURE — 3044F PR MOST RECENT HEMOGLOBIN A1C LEVEL <7.0%: ICD-10-PCS | Mod: CPTII,95,, | Performed by: FAMILY MEDICINE

## 2022-07-19 PROCEDURE — 3044F HG A1C LEVEL LT 7.0%: CPT | Mod: CPTII,95,, | Performed by: FAMILY MEDICINE

## 2022-07-19 PROCEDURE — 99213 PR OFFICE/OUTPT VISIT, EST, LEVL III, 20-29 MIN: ICD-10-PCS | Mod: 95,,, | Performed by: FAMILY MEDICINE

## 2022-07-19 PROCEDURE — 99213 OFFICE O/P EST LOW 20 MIN: CPT | Mod: 95,,, | Performed by: FAMILY MEDICINE

## 2022-07-21 NOTE — PROGRESS NOTES
The patient location is:  [place]  The chief complaint leading to consultation is: checkup  Visit type: Virtual visit with synchronous audio and video  Total time spent with patient:  Less than 30 minutes  Each patient to whom he or she provides medical services by telemedicine is:  (1) informed of the relationship between the physician and patient and the respective role of any other health care provider with respect to management of the patient; and (2) notified that he or she may decline to receive medical services by telemedicine and may withdraw from such care at any time. Vital signs recorded were provided by the patient.    Notes:  See below    Subjective:   Patient ID: Lavinia Herrera is a 47 y.o. female     Chief Complaint: checkup    Here for checkup    Review of Systems   Respiratory: Negative for shortness of breath.    Cardiovascular: Negative for chest pain.   Gastrointestinal: Negative for abdominal pain.   Genitourinary: Negative for dysuria.     Past Medical History:   Diagnosis Date    Allergy     Diabetes mellitus, type 2     GERD (gastroesophageal reflux disease)     Hypothyroidism     Morbid obesity     Otitis media     Seasonal allergies      Past Surgical History:   Procedure Laterality Date    MANDIBLE OSTEOTOMY      TYMPANOSTOMY TUBE PLACEMENT  6/30/14     Objective:   There were no vitals filed for this visit.  There is no height or weight on file to calculate BMI.  Physical Exam  Vitals and nursing note reviewed.   Constitutional:       Appearance: She is well-developed.   HENT:      Head: Normocephalic and atraumatic.   Eyes:      General: No scleral icterus.     Conjunctiva/sclera: Conjunctivae normal.   Pulmonary:      Effort: Pulmonary effort is normal. No respiratory distress.   Musculoskeletal:         General: No deformity. Normal range of motion.      Cervical back: Normal range of motion and neck supple.   Skin:     Coloration: Skin is not pale.      Findings: No  rash.   Neurological:      Mental Status: She is alert and oriented to person, place, and time.   Psychiatric:         Behavior: Behavior normal.         Thought Content: Thought content normal.         Judgment: Judgment normal.       Assessment:     1. Morbid obesity with BMI of 60.0-69.9, adult    2. Grief    3. Hypothyroidism due to Hashimoto's thyroiditis      Plan:   Morbid obesity with BMI of 60.0-69.9, adult  Counseled on diet. Continue monitor  Grief  Discussed counselling options. Will f/u  Hypothyroidism due to Hashimoto's thyroiditis  stabel on synthroid          Total time spent of Less than 30 minutes minutes on the day of the visit.This includes face to face time and preparing to see the patient, obtaining and reviewing separately obtained history, documenting clinical information in the electronic or other health record, independently interpreting results and communicating results to the patient/family/caregiver, or care coordinator.    Oniel Malik MD  07/21/2022    Portions of this note have been dictated with LINDA Denton.

## 2022-07-27 DIAGNOSIS — Z12.11 COLON CANCER SCREENING: ICD-10-CM

## 2022-08-01 ENCOUNTER — PATIENT MESSAGE (OUTPATIENT)
Dept: ADMINISTRATIVE | Facility: HOSPITAL | Age: 47
End: 2022-08-01
Payer: COMMERCIAL

## 2022-08-10 ENCOUNTER — HOSPITAL ENCOUNTER (OUTPATIENT)
Dept: RADIOLOGY | Facility: HOSPITAL | Age: 47
Discharge: HOME | End: 2022-08-10
Attending: PHYSICIAN ASSISTANT
Payer: COMMERCIAL

## 2022-08-10 DIAGNOSIS — E04.1 NODULAR THYROID DISEASE: ICD-10-CM

## 2022-08-10 PROCEDURE — 76536 US SOFT TISSUE HEAD NECK THYROID: ICD-10-PCS | Mod: 26,,, | Performed by: RADIOLOGY

## 2022-08-10 PROCEDURE — 76536 US EXAM OF HEAD AND NECK: CPT | Mod: 26,,, | Performed by: RADIOLOGY

## 2022-08-10 PROCEDURE — 76536 US EXAM OF HEAD AND NECK: CPT | Mod: TC

## 2022-08-24 ENCOUNTER — PATIENT MESSAGE (OUTPATIENT)
Dept: ADMINISTRATIVE | Facility: HOSPITAL | Age: 47
End: 2022-08-24
Payer: COMMERCIAL

## 2022-08-29 ENCOUNTER — OFFICE VISIT (OUTPATIENT)
Dept: OPHTHALMOLOGY | Facility: CLINIC | Age: 47
End: 2022-08-29
Payer: COMMERCIAL

## 2022-08-29 DIAGNOSIS — H40.013 OAG (OPEN ANGLE GLAUCOMA) SUSPECT, LOW RISK, BILATERAL: ICD-10-CM

## 2022-08-29 DIAGNOSIS — H52.13 HIGH MYOPIA, BILATERAL: ICD-10-CM

## 2022-08-29 DIAGNOSIS — H35.363 MACULAR DRUSEN, BILATERAL: ICD-10-CM

## 2022-08-29 DIAGNOSIS — E11.9 TYPE 2 DIABETES MELLITUS WITHOUT RETINOPATHY: Primary | ICD-10-CM

## 2022-08-29 PROCEDURE — 1159F PR MEDICATION LIST DOCUMENTED IN MEDICAL RECORD: ICD-10-PCS | Mod: CPTII,S$GLB,, | Performed by: OPHTHALMOLOGY

## 2022-08-29 PROCEDURE — 1160F PR REVIEW ALL MEDS BY PRESCRIBER/CLIN PHARMACIST DOCUMENTED: ICD-10-PCS | Mod: CPTII,S$GLB,, | Performed by: OPHTHALMOLOGY

## 2022-08-29 PROCEDURE — 92004 PR EYE EXAM, NEW PATIENT,COMPREHESV: ICD-10-PCS | Mod: S$GLB,,, | Performed by: OPHTHALMOLOGY

## 2022-08-29 PROCEDURE — 92004 COMPRE OPH EXAM NEW PT 1/>: CPT | Mod: S$GLB,,, | Performed by: OPHTHALMOLOGY

## 2022-08-29 PROCEDURE — 2023F DILAT RTA XM W/O RTNOPTHY: CPT | Mod: CPTII,S$GLB,, | Performed by: OPHTHALMOLOGY

## 2022-08-29 PROCEDURE — 1160F RVW MEDS BY RX/DR IN RCRD: CPT | Mod: CPTII,S$GLB,, | Performed by: OPHTHALMOLOGY

## 2022-08-29 PROCEDURE — 1159F MED LIST DOCD IN RCRD: CPT | Mod: CPTII,S$GLB,, | Performed by: OPHTHALMOLOGY

## 2022-08-29 PROCEDURE — 2023F PR DILATED RETINAL EXAM W/O EVID OF RETINOPATHY: ICD-10-PCS | Mod: CPTII,S$GLB,, | Performed by: OPHTHALMOLOGY

## 2022-08-29 PROCEDURE — 99999 PR PBB SHADOW E&M-EST. PATIENT-LVL III: ICD-10-PCS | Mod: PBBFAC,,, | Performed by: OPHTHALMOLOGY

## 2022-08-29 PROCEDURE — 3044F HG A1C LEVEL LT 7.0%: CPT | Mod: CPTII,S$GLB,, | Performed by: OPHTHALMOLOGY

## 2022-08-29 PROCEDURE — 99999 PR PBB SHADOW E&M-EST. PATIENT-LVL III: CPT | Mod: PBBFAC,,, | Performed by: OPHTHALMOLOGY

## 2022-08-29 PROCEDURE — 3044F PR MOST RECENT HEMOGLOBIN A1C LEVEL <7.0%: ICD-10-PCS | Mod: CPTII,S$GLB,, | Performed by: OPHTHALMOLOGY

## 2022-08-29 NOTE — PROGRESS NOTES
HPI    New pt DM eye exam. States having a little trouble with near vision. Pt   states using saline in OU sometimes. States DM has been stable. Denies   pain/ FOL/ floaters. Floaters OU. Not bothered by them   Eyes burn sometimes.     Hemoglobin A1C       Date                     Value               Ref Range             Status                07/01/2022               6.6 (H)             4.0 - 5.6 %           Final                   03/04/2022               6.3 (H)             4.0 - 5.6 %           Final                   11/08/2021               6.9 (H)             4.0 - 5.6 %           Final                Last edited by Maye Ramírez on 8/29/2022  2:05 PM.            Assessment /Plan     For exam results, see Encounter Report.    Type 2 diabetes mellitus without retinopathy  -     Ambulatory referral/consult to Ophthalmology    Macular drusen, bilateral    OAG (open angle glaucoma) suspect, low risk, bilateral    High myopia, bilateral    1. Type 2 diabetes mellitus without retinopathy  Diabetes without retinopathy, discussed with patient importance of glucose control and follow up.  Patient voices understanding.    2. Macular drusen, bilateral  Hard drusen, likely familial  Risk of CNV discussed  AG precautions    3. OAG (open angle glaucoma) suspect, low risk, bilateral  Neg famhx  ?pachy    ONH suspicious OD  IOP normal    Low risk  OCT NFL next visit    4. High myopia, bilateral  New glasses rx today    F/u 1 year, routine exam with OCT NFL, pachy

## 2022-09-07 DIAGNOSIS — E11.9 TYPE 2 DIABETES MELLITUS WITHOUT COMPLICATION: ICD-10-CM

## 2022-09-13 ENCOUNTER — PATIENT MESSAGE (OUTPATIENT)
Dept: ADMINISTRATIVE | Facility: HOSPITAL | Age: 47
End: 2022-09-13
Payer: COMMERCIAL

## 2022-10-17 DIAGNOSIS — K21.9 GASTROESOPHAGEAL REFLUX DISEASE, UNSPECIFIED WHETHER ESOPHAGITIS PRESENT: ICD-10-CM

## 2022-10-17 RX ORDER — PANTOPRAZOLE SODIUM 40 MG/1
40 TABLET, DELAYED RELEASE ORAL DAILY
Qty: 90 TABLET | Refills: 1 | Status: CANCELLED | OUTPATIENT
Start: 2022-10-17

## 2022-11-02 ENCOUNTER — LAB VISIT (OUTPATIENT)
Dept: LAB | Facility: HOSPITAL | Age: 47
End: 2022-11-02
Attending: PHYSICIAN ASSISTANT
Payer: COMMERCIAL

## 2022-11-02 DIAGNOSIS — E11.69 TYPE 2 DIABETES MELLITUS WITH OTHER SPECIFIED COMPLICATION, WITHOUT LONG-TERM CURRENT USE OF INSULIN: ICD-10-CM

## 2022-11-02 LAB
ALBUMIN SERPL BCP-MCNC: 3.6 G/DL (ref 3.5–5.2)
ANION GAP SERPL CALC-SCNC: 13 MMOL/L (ref 8–16)
BUN SERPL-MCNC: 15 MG/DL (ref 6–20)
CALCIUM SERPL-MCNC: 9.2 MG/DL (ref 8.7–10.5)
CHLORIDE SERPL-SCNC: 100 MMOL/L (ref 95–110)
CHOLEST SERPL-MCNC: 174 MG/DL (ref 120–199)
CHOLEST/HDLC SERPL: 2.9 {RATIO} (ref 2–5)
CO2 SERPL-SCNC: 24 MMOL/L (ref 23–29)
CREAT SERPL-MCNC: 0.9 MG/DL (ref 0.5–1.4)
EST. GFR  (NO RACE VARIABLE): >60 ML/MIN/1.73 M^2
ESTIMATED AVG GLUCOSE: 157 MG/DL (ref 68–131)
GLUCOSE SERPL-MCNC: 171 MG/DL (ref 70–110)
HBA1C MFR BLD: 7.1 % (ref 4–5.6)
HDLC SERPL-MCNC: 59 MG/DL (ref 40–75)
HDLC SERPL: 33.9 % (ref 20–50)
LDLC SERPL CALC-MCNC: 101.6 MG/DL (ref 63–159)
NONHDLC SERPL-MCNC: 115 MG/DL
PHOSPHATE SERPL-MCNC: 3.5 MG/DL (ref 2.7–4.5)
POTASSIUM SERPL-SCNC: 5.1 MMOL/L (ref 3.5–5.1)
SODIUM SERPL-SCNC: 137 MMOL/L (ref 136–145)
T4 FREE SERPL-MCNC: 0.84 NG/DL (ref 0.71–1.51)
TRIGL SERPL-MCNC: 67 MG/DL (ref 30–150)
TSH SERPL DL<=0.005 MIU/L-ACNC: 2.87 UIU/ML (ref 0.4–4)

## 2022-11-02 PROCEDURE — 36415 COLL VENOUS BLD VENIPUNCTURE: CPT | Mod: PO | Performed by: PHYSICIAN ASSISTANT

## 2022-11-02 PROCEDURE — 84443 ASSAY THYROID STIM HORMONE: CPT | Performed by: PHYSICIAN ASSISTANT

## 2022-11-02 PROCEDURE — 80061 LIPID PANEL: CPT | Performed by: PHYSICIAN ASSISTANT

## 2022-11-02 PROCEDURE — 84439 ASSAY OF FREE THYROXINE: CPT | Performed by: PHYSICIAN ASSISTANT

## 2022-11-02 PROCEDURE — 80069 RENAL FUNCTION PANEL: CPT | Performed by: PHYSICIAN ASSISTANT

## 2022-11-02 PROCEDURE — 83036 HEMOGLOBIN GLYCOSYLATED A1C: CPT | Performed by: PHYSICIAN ASSISTANT

## 2022-11-09 ENCOUNTER — OFFICE VISIT (OUTPATIENT)
Dept: ENDOCRINOLOGY | Facility: CLINIC | Age: 47
End: 2022-11-09
Payer: COMMERCIAL

## 2022-11-09 VITALS
HEIGHT: 67 IN | BODY MASS INDEX: 45.99 KG/M2 | OXYGEN SATURATION: 94 % | WEIGHT: 293 LBS | HEART RATE: 77 BPM | TEMPERATURE: 99 F | SYSTOLIC BLOOD PRESSURE: 130 MMHG | DIASTOLIC BLOOD PRESSURE: 80 MMHG

## 2022-11-09 DIAGNOSIS — E03.9 HYPOTHYROIDISM, UNSPECIFIED TYPE: ICD-10-CM

## 2022-11-09 DIAGNOSIS — E78.5 HYPERLIPIDEMIA, UNSPECIFIED HYPERLIPIDEMIA TYPE: ICD-10-CM

## 2022-11-09 DIAGNOSIS — E11.69 TYPE 2 DIABETES MELLITUS WITH OTHER SPECIFIED COMPLICATION, WITHOUT LONG-TERM CURRENT USE OF INSULIN: Primary | ICD-10-CM

## 2022-11-09 DIAGNOSIS — E03.8 HYPOTHYROIDISM DUE TO HASHIMOTO'S THYROIDITIS: ICD-10-CM

## 2022-11-09 DIAGNOSIS — E66.01 MORBID OBESITY WITH BMI OF 60.0-69.9, ADULT: ICD-10-CM

## 2022-11-09 DIAGNOSIS — E06.3 HYPOTHYROIDISM DUE TO HASHIMOTO'S THYROIDITIS: ICD-10-CM

## 2022-11-09 DIAGNOSIS — E61.1 IRON DEFICIENCY: ICD-10-CM

## 2022-11-09 DIAGNOSIS — J06.9 UPPER RESPIRATORY TRACT INFECTION, UNSPECIFIED TYPE: ICD-10-CM

## 2022-11-09 DIAGNOSIS — E04.1 NODULAR THYROID DISEASE: ICD-10-CM

## 2022-11-09 PROCEDURE — 3079F PR MOST RECENT DIASTOLIC BLOOD PRESSURE 80-89 MM HG: ICD-10-PCS | Mod: CPTII,S$GLB,, | Performed by: PHYSICIAN ASSISTANT

## 2022-11-09 PROCEDURE — 99999 PR PBB SHADOW E&M-EST. PATIENT-LVL IV: ICD-10-PCS | Mod: PBBFAC,,, | Performed by: PHYSICIAN ASSISTANT

## 2022-11-09 PROCEDURE — 1159F MED LIST DOCD IN RCRD: CPT | Mod: CPTII,S$GLB,, | Performed by: PHYSICIAN ASSISTANT

## 2022-11-09 PROCEDURE — 3008F BODY MASS INDEX DOCD: CPT | Mod: CPTII,S$GLB,, | Performed by: PHYSICIAN ASSISTANT

## 2022-11-09 PROCEDURE — 3075F SYST BP GE 130 - 139MM HG: CPT | Mod: CPTII,S$GLB,, | Performed by: PHYSICIAN ASSISTANT

## 2022-11-09 PROCEDURE — 1160F RVW MEDS BY RX/DR IN RCRD: CPT | Mod: CPTII,S$GLB,, | Performed by: PHYSICIAN ASSISTANT

## 2022-11-09 PROCEDURE — 99214 PR OFFICE/OUTPT VISIT, EST, LEVL IV, 30-39 MIN: ICD-10-PCS | Mod: S$GLB,,, | Performed by: PHYSICIAN ASSISTANT

## 2022-11-09 PROCEDURE — 3051F HG A1C>EQUAL 7.0%<8.0%: CPT | Mod: CPTII,S$GLB,, | Performed by: PHYSICIAN ASSISTANT

## 2022-11-09 PROCEDURE — 3075F PR MOST RECENT SYSTOLIC BLOOD PRESS GE 130-139MM HG: ICD-10-PCS | Mod: CPTII,S$GLB,, | Performed by: PHYSICIAN ASSISTANT

## 2022-11-09 PROCEDURE — 3079F DIAST BP 80-89 MM HG: CPT | Mod: CPTII,S$GLB,, | Performed by: PHYSICIAN ASSISTANT

## 2022-11-09 PROCEDURE — 1159F PR MEDICATION LIST DOCUMENTED IN MEDICAL RECORD: ICD-10-PCS | Mod: CPTII,S$GLB,, | Performed by: PHYSICIAN ASSISTANT

## 2022-11-09 PROCEDURE — 1160F PR REVIEW ALL MEDS BY PRESCRIBER/CLIN PHARMACIST DOCUMENTED: ICD-10-PCS | Mod: CPTII,S$GLB,, | Performed by: PHYSICIAN ASSISTANT

## 2022-11-09 PROCEDURE — 3051F PR MOST RECENT HEMOGLOBIN A1C LEVEL 7.0 - < 8.0%: ICD-10-PCS | Mod: CPTII,S$GLB,, | Performed by: PHYSICIAN ASSISTANT

## 2022-11-09 PROCEDURE — 3008F PR BODY MASS INDEX (BMI) DOCUMENTED: ICD-10-PCS | Mod: CPTII,S$GLB,, | Performed by: PHYSICIAN ASSISTANT

## 2022-11-09 PROCEDURE — 99999 PR PBB SHADOW E&M-EST. PATIENT-LVL IV: CPT | Mod: PBBFAC,,, | Performed by: PHYSICIAN ASSISTANT

## 2022-11-09 PROCEDURE — 99214 OFFICE O/P EST MOD 30 MIN: CPT | Mod: S$GLB,,, | Performed by: PHYSICIAN ASSISTANT

## 2022-11-09 RX ORDER — LEVOTHYROXINE SODIUM 100 UG/1
100 TABLET ORAL
Qty: 30 TABLET | Refills: 11 | Status: SHIPPED | OUTPATIENT
Start: 2022-11-09 | End: 2023-11-16 | Stop reason: SDUPTHER

## 2022-11-09 RX ORDER — AZITHROMYCIN 250 MG/1
TABLET, FILM COATED ORAL
Qty: 6 TABLET | Refills: 0 | Status: SHIPPED | OUTPATIENT
Start: 2022-11-09 | End: 2022-11-14

## 2022-11-09 RX ORDER — TIRZEPATIDE 5 MG/.5ML
5 INJECTION, SOLUTION SUBCUTANEOUS
Qty: 4 PEN | Refills: 0 | Status: SHIPPED | OUTPATIENT
Start: 2022-11-09 | End: 2022-12-01 | Stop reason: SDUPTHER

## 2022-11-09 NOTE — PROGRESS NOTES
CC: This 47 y.o. female presents for management of Diabetes Mellitus  and chronic conditions pending review including HTN, HLP.    HPI: was diagnosed with T2DM in 2017. Her son  from suicide in 10/21. Arrives with her , Maldonado.  Has never been hospitalized r/t DM.  Family hx of DM: grandmother  Fhx of thyroid disease:her sister has Hashimoto's Disease  Denies missing doses of DM medication.   hypoglycemia at home: none   monitoring BG at home:  Fastins    Pt was started on Lipitor last visit but experienced side effects.   She could not remember the side effect.    Diet: 3 meals daily.   BF-banana, mk yogurt  Snack:egg bites (kale and spinach), bagel  W/ cream cheese  LH: lean cuisine  DN: chicken nachos  No snacks. Avoids sugary beverages. Drinks protein shake in the morning.    Exercise: None     CURRENT DM MEDS: Trulicity 0.75 mg weekly (stopped due to cost).  Previous meds:  Metformin-constipation    Standards of Care:  Eye exam:  Dr. Wang[facility]-macular degeneration  Podiatry exam:  me  DE: 10/21 [facility] [place]    Hypothyroidism  Taking 88 mcg qd.   Dx in .   No hx of neck radiation.   + fatigue, sweating, occ constipation.   No hair loss, heat/cold intolerance, tremors. Cycles are irregular. She does have the mirena.    Nodular Thyroid Disease  Thyroid u/s  shows two 2 cm nodules in the left thyroid and a 1.4 cm nodule in the right with macrocalcifications. Thyroid u/s  shows nodules have increased in size. No sob, dysphagia or voice changes. FNAs of the right middle nodule and left upper nodule resulted in FLUS.     She is taking vitamin d and zinc.  PMHx, PSHx: reviewed in epic.  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    Wt Readings from Last 10 Encounters:   22 (!) 199.5 kg (439 lb 13.1 oz)   22 (!) 191 kg (421 lb 1.3 oz)   22 (!) 189.5 kg (417 lb 12.4 oz)   22 (!) 187.3 kg (412 lb 14.8 oz)   21 (!) 184.3 kg (406 lb 4.9  "oz)   11/09/21 (!) 185.4 kg (408 lb 11.7 oz)   10/19/21 (!) 192 kg (423 lb 4.5 oz)   10/12/21 (!) 192 kg (423 lb 4.5 oz)   08/18/21 (!) 194.6 kg (429 lb 0.2 oz)   08/03/21 (!) 194.7 kg (429 lb 5.5 oz)      ROS:   Gen: Appetite good, + wt loss (18 lbs), denies fatigue and weakness.  Skin: Skin is intact and heals well, no rashes, no hair changes  Eyes: Denies visual disturbances  Resp: no SOB or ANGELES, no cough  Cardiac: No palpitations, chest pain, no edema   GI: No nausea or vomiting, diarrhea, constipation, or abdominal pain.  /GYN: No nocturia, burning or pain.   MS/Neuro: Denies numbness/ tingling in BLE; Gait steady, speech clear  Psych: Denies drug/ETOH abuse, no hx of depression.  Other systems: negative.    /80 (BP Location: Left arm, Patient Position: Sitting, BP Method: Large (Manual))   Pulse 77   Temp 98.5 °F (36.9 °C) (Oral)   Ht 5' 7" (1.702 m)   Wt (!) 199.5 kg (439 lb 13.1 oz)   SpO2 (!) 94%   BMI 68.89 kg/m²      PE:  GENERAL: middle aged female, well developed, well nourished.  PSYCH: AAOx3, appropriate mood and affect, pleasant expression, conversant, appears relaxed, well groomed.   EYES: Conjunctiva, corneas clear  NECK: Supple, trachea midline, thyromegaly. + AN  CHEST: Resp even and unlabored, CTA bilateral.  CARDIAC: RRR, S1, S2 heard, no murmurs  VASCULAR: DP pulses +2/4 bilaterally, right ankle edema.  NEURO: Gait steady, CN ll-Xll grossly intact.  SKIN: Skin warm and dry no acanthosis nigracans.  8/22  Foot Exam: no sores or macerations noted.     Protective Sensation (w/ 10 gram monofilament):  Right: Intact  Left: Intact    Visual Inspection:  Normal -  Bilateral and Nails Intact - without Evidence of Foot Deformity- Bilateral    Pedal Pulses:   Right: Present  Left: Present    Posterior tibialis:   Right:Present  Left: Present     Vibratory Sensation  Right:Positive  Left:Positive     Personally reviewed labs below:    Lab Visit on 11/02/2022   Component Date Value Ref Range " Status    TSH 11/02/2022 2.868  0.400 - 4.000 uIU/mL Final    Hemoglobin A1C 11/02/2022 7.1 (H)  4.0 - 5.6 % Final    Comment: ADA Screening Guidelines:  5.7-6.4%  Consistent with prediabetes  >or=6.5%  Consistent with diabetes    High levels of fetal hemoglobin interfere with the HbA1C  assay. Heterozygous hemoglobin variants (HbS, HgC, etc)do  not significantly interfere with this assay.   However, presence of multiple variants may affect accuracy.      Estimated Avg Glucose 11/02/2022 157 (H)  68 - 131 mg/dL Final    Glucose 11/02/2022 171 (H)  70 - 110 mg/dL Final    Sodium 11/02/2022 137  136 - 145 mmol/L Final    Potassium 11/02/2022 5.1  3.5 - 5.1 mmol/L Final    Chloride 11/02/2022 100  95 - 110 mmol/L Final    CO2 11/02/2022 24  23 - 29 mmol/L Final    BUN 11/02/2022 15  6 - 20 mg/dL Final    Calcium 11/02/2022 9.2  8.7 - 10.5 mg/dL Final    Creatinine 11/02/2022 0.9  0.5 - 1.4 mg/dL Final    Albumin 11/02/2022 3.6  3.5 - 5.2 g/dL Final    Phosphorus 11/02/2022 3.5  2.7 - 4.5 mg/dL Final    eGFR 11/02/2022 >60.0  >60 mL/min/1.73 m^2 Final    Anion Gap 11/02/2022 13  8 - 16 mmol/L Final    Free T4 11/02/2022 0.84  0.71 - 1.51 ng/dL Final    Cholesterol 11/02/2022 174  120 - 199 mg/dL Final    Comment: The National Cholesterol Education Program (NCEP) has set the  following guidelines (reference ranges) for Cholesterol:  Optimal.....................<200 mg/dL  Borderline High.............200-239 mg/dL  High........................> or = 240 mg/dL      Triglycerides 11/02/2022 67  30 - 150 mg/dL Final    Comment: The National Cholesterol Education Program (NCEP) has set the  following guidelines (reference values) for triglycerides:  Normal......................<150 mg/dL  Borderline High.............150-199 mg/dL  High........................200-499 mg/dL      HDL 11/02/2022 59  40 - 75 mg/dL Final    Comment: The National Cholesterol Education Program (NCEP) has set the  following guidelines (reference  values) for HDL Cholesterol:  Low...............<40 mg/dL  Optimal...........>60 mg/dL      LDL Cholesterol 11/02/2022 101.6  63.0 - 159.0 mg/dL Final    Comment: The National Cholesterol Education Program (NCEP) has set the  following guidelines (reference values) for LDL Cholesterol:  Optimal.......................<130 mg/dL  Borderline High...............130-159 mg/dL  High..........................160-189 mg/dL  Very High.....................>190 mg/dL      HDL/Cholesterol Ratio 11/02/2022 33.9  20.0 - 50.0 % Final    Total Cholesterol/HDL Ratio 11/02/2022 2.9  2.0 - 5.0 Final    Non-HDL Cholesterol 11/02/2022 115  mg/dL Final    Comment: Risk category and Non-HDL cholesterol goals:  Coronary heart disease (CHD)or equivalent (10-year risk of CHD >20%):  Non-HDL cholesterol goal     <130 mg/dL  Two or more CHD risk factors and 10-year risk of CHD <= 20%:  Non-HDL cholesterol goal     <160 mg/dL  0 to 1 CHD risk factor:  Non-HDL cholesterol goal     <190 mg/dL      EXAMINATION:  US SOFT TISSUE HEAD NECK THYROID     CLINICAL HISTORY:  Nontoxic single thyroid nodule     TECHNIQUE:  Ultrasound of the thyroid and cervical lymph nodes was performed.     COMPARISON:  Thyroid ultrasound of February 23, 2022     FINDINGS:  The right lobe measures 6.2 x 2.7 x 3.3 cm for a calculated volume of 29 cc.  The left lobe measures 6 x 3 x 3 cm for a calculated volume of 28 cc and a total thyroid volume of 57 cc.     On today's study on the right there is a 1 cm, a 1.6 cm and a 1.3 cm solid nodule of the right thyroid.  On the left there is a 2.2 cm and a 1.7 cm nodule of the left thyroid.  The patient has undergone bilateral fine-needle aspiration in March 2, 2022.     Impression:     Multinodular goiter.  Enlarging nodules are not seen        Electronically signed by: Yazan Madison MD  Date:                                            08/10/2022  Time:                                           11:16     ASSESSMENT and  PLAN:    1. Type 2 diabetes mellitus with other specified complication, without long-term current use of insulin        2. Hyperlipidemia, unspecified hyperlipidemia type        3. Hypothyroidism due to Hashimoto's thyroiditis        4. Nodular thyroid disease        5. Hypothyroidism, unspecified type        6. Iron deficiency        7. Morbid obesity with BMI of 60.0-69.9, adult           T2DM with hyperglycemia-A1c is above goal. Start Mounjaro 5 mg weekly. No fhx of MEN or thyroid cancer. No hx of pancreatitis. This will prevent further increases in her glucoses and will help her lose weight. She did not tolerate Metformin in the past. Discussed DM, progression of disease, long term complications, tx options.   Discussed A1c and BG goals.   Reviewed  hypoglycemia, s/s and appropriate tx.   Instructed to monitor BG and bring meter/ log to every clinic visit.   - takes ASA, ACEi,     HLP -elevated LDL ,  LFTs WNL. Continue zetia 10 mg daily. Declines statins.  Hypothyroidism-TSH is elevated.  Increase Levothyroxine to 100 mcg daily. Continue Selenium 200 mcg qd.  Nodular Thyroid disease-pt declines bx. Pt wants to follow up w/ u/s. FNAs showed FLUS>  Iron deficiency-may falsely lower a1c.  Obesity-Body mass index is 68.89 kg/m². Increase exercise to 30 min daily. Decrease carbohydrates in diet. Pt plans to meal prep.    Tsh, t4 in six weeks  Follow-up: in 4 months with lab prior

## 2022-11-09 NOTE — PATIENT INSTRUCTIONS
Start Mounjaro 5 mg weekly. Increase Levothyroxine to 100 mcg daily. Start exercising for 30 minutes daily.

## 2022-12-01 RX ORDER — TIRZEPATIDE 5 MG/.5ML
5 INJECTION, SOLUTION SUBCUTANEOUS
Qty: 4 PEN | Refills: 11 | Status: SHIPPED | OUTPATIENT
Start: 2022-12-01 | End: 2022-12-06

## 2022-12-06 ENCOUNTER — PATIENT MESSAGE (OUTPATIENT)
Dept: ENDOCRINOLOGY | Facility: CLINIC | Age: 47
End: 2022-12-06
Payer: COMMERCIAL

## 2022-12-06 ENCOUNTER — PATIENT MESSAGE (OUTPATIENT)
Dept: ADMINISTRATIVE | Facility: HOSPITAL | Age: 47
End: 2022-12-06
Payer: COMMERCIAL

## 2022-12-06 DIAGNOSIS — E11.69 TYPE 2 DIABETES MELLITUS WITH OTHER SPECIFIED COMPLICATION, WITHOUT LONG-TERM CURRENT USE OF INSULIN: Primary | ICD-10-CM

## 2022-12-07 ENCOUNTER — PATIENT MESSAGE (OUTPATIENT)
Dept: ENDOCRINOLOGY | Facility: CLINIC | Age: 47
End: 2022-12-07
Payer: COMMERCIAL

## 2022-12-19 ENCOUNTER — PATIENT MESSAGE (OUTPATIENT)
Dept: ENDOCRINOLOGY | Facility: CLINIC | Age: 47
End: 2022-12-19
Payer: COMMERCIAL

## 2022-12-19 DIAGNOSIS — E11.69 TYPE 2 DIABETES MELLITUS WITH OTHER SPECIFIED COMPLICATION, WITHOUT LONG-TERM CURRENT USE OF INSULIN: ICD-10-CM

## 2022-12-21 ENCOUNTER — TELEPHONE (OUTPATIENT)
Dept: PHARMACY | Facility: CLINIC | Age: 47
End: 2022-12-21
Payer: COMMERCIAL

## 2022-12-30 ENCOUNTER — PATIENT MESSAGE (OUTPATIENT)
Dept: ENDOCRINOLOGY | Facility: CLINIC | Age: 47
End: 2022-12-30
Payer: COMMERCIAL

## 2022-12-30 ENCOUNTER — TELEPHONE (OUTPATIENT)
Dept: FAMILY MEDICINE | Facility: CLINIC | Age: 47
End: 2022-12-30
Payer: COMMERCIAL

## 2022-12-30 NOTE — TELEPHONE ENCOUNTER
Patient just tested at home- advised her due to Dr. Malik is out today and it is 5:00 she should go to urgent care.

## 2022-12-30 NOTE — TELEPHONE ENCOUNTER
----- Message from Lorenza Guerra, Patient Care Assistant sent at 12/30/2022  4:42 PM CST -----  Contact: Pt  Type:  RX Refill Request    Who Called:  Pt  Refill or New Rx:  New Rx  RX Name and Strength:  Anti-viral Covid+  How is the patient currently taking it? (ex. 1XDay):  As Directed  Is this a 30 day or 90 day RX:  10  Preferred Pharmacy with phone number:    [id number] - [address] AT [facility] [address] [address]  Phone: [phone] Fax: [phone]  Local or Mail Order:  local  Ordering Provider:  King Porter Call Back Number:  [phone]  Additional Information:  Please contact pt upon completion-Thank you~

## 2023-01-05 ENCOUNTER — PATIENT OUTREACH (OUTPATIENT)
Dept: ADMINISTRATIVE | Facility: OTHER | Age: 48
End: 2023-01-05
Payer: COMMERCIAL

## 2023-01-05 NOTE — PROGRESS NOTES
Patient stated to chw that she would like a call back tomorrow 3pm    CHW - Initial Contact    This Community Health Worker completed the Social Determinant of Health questionnaire with patient via telephone today.    Pt identified barriers of most importance are: patient stated she needs help with financial assistance  with [facility] bills   Referrals to community agencies completed with patient/caregiver consent outside of [facility] include: chw sent out a financial assistance application to patient to help her with her needs   Referrals were put through [facility] - no:   Support and Services: yes  Other information discussed the patient needs / wants help with: no   Follow up required:   Follow-up Outreach - Due: 2/2/2023

## 2023-01-30 ENCOUNTER — PATIENT OUTREACH (OUTPATIENT)
Dept: ADMINISTRATIVE | Facility: OTHER | Age: 48
End: 2023-01-30
Payer: COMMERCIAL

## 2023-01-30 NOTE — PROGRESS NOTES
CHW - Follow Up    This Community Health Worker completed a follow up visit with patient via telephone today.  Pt/Caregiver reported: patient stated that she did received financial application  Community Health Worker provided: chw spoke with patient and patient stated that she did received the info chw sent out chw will check back in 2 weeks   Follow up required:   Follow-up Outreach - Due: 2/23/2023

## 2023-02-24 ENCOUNTER — PATIENT OUTREACH (OUTPATIENT)
Dept: ADMINISTRATIVE | Facility: OTHER | Age: 48
End: 2023-02-24
Payer: COMMERCIAL

## 2023-02-24 NOTE — PROGRESS NOTES
CHW - Case Closure    This Community Health Worker spoke to patient via telephone today.   Pt/Caregiver reported: patient stated that she has everything she needs and that she is not in the need of any assistance at this time  Pt/Caregiver denied any additional needs at this time and agrees with episode closure at this time.  Provided patient with Community Health Worker's contact information and encouraged him/her to contact this Community Health Worker if additional needs arise.

## 2023-03-01 ENCOUNTER — HOSPITAL ENCOUNTER (INPATIENT)
Facility: HOSPITAL | Age: 48
LOS: 5 days | Discharge: HOME | DRG: 417 | End: 2023-03-08
Attending: EMERGENCY MEDICINE | Admitting: INTERNAL MEDICINE
Payer: COMMERCIAL

## 2023-03-01 DIAGNOSIS — R52 PAIN: ICD-10-CM

## 2023-03-01 DIAGNOSIS — E80.6 HYPERBILIRUBINEMIA: ICD-10-CM

## 2023-03-01 DIAGNOSIS — R74.01 TRANSAMINITIS: ICD-10-CM

## 2023-03-01 DIAGNOSIS — K80.01 CALCULUS OF GALLBLADDER WITH ACUTE CHOLECYSTITIS AND OBSTRUCTION: Primary | ICD-10-CM

## 2023-03-01 DIAGNOSIS — K80.00 CALCULUS OF GALLBLADDER WITH ACUTE CHOLECYSTITIS WITHOUT OBSTRUCTION: ICD-10-CM

## 2023-03-01 DIAGNOSIS — K80.00 CHOLELITHIASIS WITH ACUTE CHOLECYSTITIS: ICD-10-CM

## 2023-03-01 DIAGNOSIS — R07.9 CHEST PAIN: ICD-10-CM

## 2023-03-01 DIAGNOSIS — K80.50 CHOLEDOCHOLITHIASIS: ICD-10-CM

## 2023-03-01 LAB
ALBUMIN SERPL BCP-MCNC: 3.5 G/DL (ref 3.5–5.2)
ALP SERPL-CCNC: 232 U/L (ref 55–135)
ALT SERPL W/O P-5'-P-CCNC: 347 U/L (ref 10–44)
ANION GAP SERPL CALC-SCNC: 11 MMOL/L (ref 8–16)
AST SERPL-CCNC: 400 U/L (ref 10–40)
B-HCG UR QL: NEGATIVE
BACTERIA #/AREA URNS HPF: NEGATIVE /HPF
BASOPHILS # BLD AUTO: 0.03 K/UL (ref 0–0.2)
BASOPHILS NFR BLD: 0.3 % (ref 0–1.9)
BILIRUB SERPL-MCNC: 2.6 MG/DL (ref 0.1–1)
BILIRUB UR QL STRIP: ABNORMAL
BNP SERPL-MCNC: 83 PG/ML (ref 0–99)
BUN SERPL-MCNC: 11 MG/DL (ref 6–20)
CALCIUM SERPL-MCNC: 8.6 MG/DL (ref 8.7–10.5)
CHLORIDE SERPL-SCNC: 99 MMOL/L (ref 95–110)
CLARITY UR: CLEAR
CO2 SERPL-SCNC: 24 MMOL/L (ref 23–29)
COLOR UR: YELLOW
CREAT SERPL-MCNC: 0.8 MG/DL (ref 0.5–1.4)
CREAT SERPL-MCNC: 0.8 MG/DL (ref 0.5–1.4)
CTP QC/QA: YES
D DIMER PPP IA.FEU-MCNC: 2.19 MG/L FEU
DIFFERENTIAL METHOD: ABNORMAL
EOSINOPHIL # BLD AUTO: 0.1 K/UL (ref 0–0.5)
EOSINOPHIL NFR BLD: 1.1 % (ref 0–8)
ERYTHROCYTE [DISTWIDTH] IN BLOOD BY AUTOMATED COUNT: 15 % (ref 11.5–14.5)
EST. GFR  (NO RACE VARIABLE): >60 ML/MIN/1.73 M^2
GLUCOSE SERPL-MCNC: 185 MG/DL (ref 70–110)
GLUCOSE UR QL STRIP: NEGATIVE
HCT VFR BLD AUTO: 37.8 % (ref 37–48.5)
HGB BLD-MCNC: 11.8 G/DL (ref 12–16)
HGB UR QL STRIP: ABNORMAL
HYALINE CASTS #/AREA URNS LPF: 7 /LPF
IMM GRANULOCYTES # BLD AUTO: 0.03 K/UL (ref 0–0.04)
IMM GRANULOCYTES NFR BLD AUTO: 0.3 % (ref 0–0.5)
KETONES UR QL STRIP: NEGATIVE
LEUKOCYTE ESTERASE UR QL STRIP: ABNORMAL
LIPASE SERPL-CCNC: 3597 U/L (ref 4–60)
LYMPHOCYTES # BLD AUTO: 0.8 K/UL (ref 1–4.8)
LYMPHOCYTES NFR BLD: 8.9 % (ref 18–48)
MAGNESIUM SERPL-MCNC: 1.9 MG/DL (ref 1.6–2.6)
MCH RBC QN AUTO: 28.4 PG (ref 27–31)
MCHC RBC AUTO-ENTMCNC: 31.2 G/DL (ref 32–36)
MCV RBC AUTO: 91 FL (ref 82–98)
MICROSCOPIC COMMENT: ABNORMAL
MONOCYTES # BLD AUTO: 0.6 K/UL (ref 0.3–1)
MONOCYTES NFR BLD: 6.4 % (ref 4–15)
NEUTROPHILS # BLD AUTO: 7.4 K/UL (ref 1.8–7.7)
NEUTROPHILS NFR BLD: 83 % (ref 38–73)
NITRITE UR QL STRIP: NEGATIVE
NRBC BLD-RTO: 0 /100 WBC
PH UR STRIP: 6 [PH] (ref 5–8)
PLATELET # BLD AUTO: 235 K/UL (ref 150–450)
PMV BLD AUTO: 11.2 FL (ref 9.2–12.9)
POTASSIUM SERPL-SCNC: 4.9 MMOL/L (ref 3.5–5.1)
PROT SERPL-MCNC: 7.3 G/DL (ref 6–8.4)
PROT UR QL STRIP: ABNORMAL
RBC # BLD AUTO: 4.16 M/UL (ref 4–5.4)
RBC #/AREA URNS HPF: 69 /HPF (ref 0–4)
SAMPLE: NORMAL
SODIUM SERPL-SCNC: 134 MMOL/L (ref 136–145)
SP GR UR STRIP: >1.03 (ref 1–1.03)
SQUAMOUS #/AREA URNS HPF: 4 /HPF
TROPONIN I SERPL HS-MCNC: 6.3 PG/ML (ref 0–14.9)
TROPONIN I SERPL HS-MCNC: 7.5 PG/ML (ref 0–14.9)
URN SPEC COLLECT METH UR: ABNORMAL
UROBILINOGEN UR STRIP-ACNC: ABNORMAL EU/DL
WBC # BLD AUTO: 8.95 K/UL (ref 3.9–12.7)
WBC #/AREA URNS HPF: 6 /HPF (ref 0–5)

## 2023-03-01 PROCEDURE — 81025 URINE PREGNANCY TEST: CPT | Performed by: EMERGENCY MEDICINE

## 2023-03-01 PROCEDURE — 80053 COMPREHEN METABOLIC PANEL: CPT | Performed by: EMERGENCY MEDICINE

## 2023-03-01 PROCEDURE — 85025 COMPLETE CBC W/AUTO DIFF WBC: CPT | Performed by: EMERGENCY MEDICINE

## 2023-03-01 PROCEDURE — 84484 ASSAY OF TROPONIN QUANT: CPT | Performed by: EMERGENCY MEDICINE

## 2023-03-01 PROCEDURE — 93005 ELECTROCARDIOGRAM TRACING: CPT | Performed by: INTERNAL MEDICINE

## 2023-03-01 PROCEDURE — 96376 TX/PRO/DX INJ SAME DRUG ADON: CPT

## 2023-03-01 PROCEDURE — 96375 TX/PRO/DX INJ NEW DRUG ADDON: CPT

## 2023-03-01 PROCEDURE — 83735 ASSAY OF MAGNESIUM: CPT | Performed by: EMERGENCY MEDICINE

## 2023-03-01 PROCEDURE — 93010 ELECTROCARDIOGRAM REPORT: CPT | Mod: ,,, | Performed by: INTERNAL MEDICINE

## 2023-03-01 PROCEDURE — 83690 ASSAY OF LIPASE: CPT | Performed by: EMERGENCY MEDICINE

## 2023-03-01 PROCEDURE — 83880 ASSAY OF NATRIURETIC PEPTIDE: CPT | Performed by: EMERGENCY MEDICINE

## 2023-03-01 PROCEDURE — 63600175 PHARM REV CODE 636 W HCPCS: Performed by: EMERGENCY MEDICINE

## 2023-03-01 PROCEDURE — 99285 EMERGENCY DEPT VISIT HI MDM: CPT | Mod: 25

## 2023-03-01 PROCEDURE — 96361 HYDRATE IV INFUSION ADD-ON: CPT

## 2023-03-01 PROCEDURE — 93010 EKG 12-LEAD: ICD-10-PCS | Mod: ,,, | Performed by: INTERNAL MEDICINE

## 2023-03-01 PROCEDURE — 85379 FIBRIN DEGRADATION QUANT: CPT | Performed by: EMERGENCY MEDICINE

## 2023-03-01 PROCEDURE — 81001 URINALYSIS AUTO W/SCOPE: CPT | Performed by: EMERGENCY MEDICINE

## 2023-03-01 PROCEDURE — 96365 THER/PROPH/DIAG IV INF INIT: CPT

## 2023-03-01 RX ORDER — ONDANSETRON 2 MG/ML
4 INJECTION INTRAMUSCULAR; INTRAVENOUS
Status: COMPLETED | OUTPATIENT
Start: 2023-03-01 | End: 2023-03-01

## 2023-03-01 RX ORDER — MORPHINE SULFATE 4 MG/ML
4 INJECTION, SOLUTION INTRAMUSCULAR; INTRAVENOUS
Status: COMPLETED | OUTPATIENT
Start: 2023-03-01 | End: 2023-03-01

## 2023-03-01 RX ADMIN — ONDANSETRON 4 MG: 2 INJECTION INTRAMUSCULAR; INTRAVENOUS at 08:03

## 2023-03-01 RX ADMIN — SODIUM CHLORIDE, SODIUM LACTATE, POTASSIUM CHLORIDE, AND CALCIUM CHLORIDE 1000 ML: .6; .31; .03; .02 INJECTION, SOLUTION INTRAVENOUS at 10:03

## 2023-03-01 RX ADMIN — MORPHINE SULFATE 4 MG: 4 INJECTION, SOLUTION INTRAMUSCULAR; INTRAVENOUS at 10:03

## 2023-03-01 RX ADMIN — PIPERACILLIN AND TAZOBACTAM 4.5 G: 4; .5 INJECTION, POWDER, LYOPHILIZED, FOR SOLUTION INTRAVENOUS; PARENTERAL at 09:03

## 2023-03-01 RX ADMIN — MORPHINE SULFATE 4 MG: 4 INJECTION, SOLUTION INTRAMUSCULAR; INTRAVENOUS at 08:03

## 2023-03-02 PROBLEM — K80.00 CHOLELITHIASIS WITH ACUTE CHOLECYSTITIS: Status: ACTIVE | Noted: 2023-03-02

## 2023-03-02 PROBLEM — E11.9 DIABETES MELLITUS: Status: ACTIVE | Noted: 2023-03-02

## 2023-03-02 LAB
ALBUMIN SERPL BCP-MCNC: 3.4 G/DL (ref 3.5–5.2)
ALP SERPL-CCNC: 246 U/L (ref 55–135)
ALT SERPL W/O P-5'-P-CCNC: 348 U/L (ref 10–44)
ANION GAP SERPL CALC-SCNC: 10 MMOL/L (ref 8–16)
AST SERPL-CCNC: 323 U/L (ref 10–40)
BASOPHILS # BLD AUTO: 0.02 K/UL (ref 0–0.2)
BASOPHILS NFR BLD: 0.2 % (ref 0–1.9)
BILIRUB SERPL-MCNC: 3.3 MG/DL (ref 0.1–1)
BUN SERPL-MCNC: 9 MG/DL (ref 6–20)
CALCIUM SERPL-MCNC: 8.7 MG/DL (ref 8.7–10.5)
CHLORIDE SERPL-SCNC: 102 MMOL/L (ref 95–110)
CO2 SERPL-SCNC: 26 MMOL/L (ref 23–29)
CREAT SERPL-MCNC: 0.7 MG/DL (ref 0.5–1.4)
DIFFERENTIAL METHOD: ABNORMAL
EOSINOPHIL # BLD AUTO: 0.1 K/UL (ref 0–0.5)
EOSINOPHIL NFR BLD: 1.1 % (ref 0–8)
ERYTHROCYTE [DISTWIDTH] IN BLOOD BY AUTOMATED COUNT: 15.4 % (ref 11.5–14.5)
EST. GFR  (NO RACE VARIABLE): >60 ML/MIN/1.73 M^2
GLUCOSE SERPL-MCNC: 135 MG/DL (ref 70–110)
GLUCOSE SERPL-MCNC: 150 MG/DL (ref 70–110)
GLUCOSE SERPL-MCNC: 153 MG/DL (ref 70–110)
HCT VFR BLD AUTO: 38.1 % (ref 37–48.5)
HGB BLD-MCNC: 11.9 G/DL (ref 12–16)
IMM GRANULOCYTES # BLD AUTO: 0.04 K/UL (ref 0–0.04)
IMM GRANULOCYTES NFR BLD AUTO: 0.3 % (ref 0–0.5)
LYMPHOCYTES # BLD AUTO: 0.7 K/UL (ref 1–4.8)
LYMPHOCYTES NFR BLD: 5.5 % (ref 18–48)
MCH RBC QN AUTO: 28.5 PG (ref 27–31)
MCHC RBC AUTO-ENTMCNC: 31.2 G/DL (ref 32–36)
MCV RBC AUTO: 91 FL (ref 82–98)
MONOCYTES # BLD AUTO: 0.9 K/UL (ref 0.3–1)
MONOCYTES NFR BLD: 7.7 % (ref 4–15)
NEUTROPHILS # BLD AUTO: 10.1 K/UL (ref 1.8–7.7)
NEUTROPHILS NFR BLD: 85.2 % (ref 38–73)
NRBC BLD-RTO: 0 /100 WBC
PLATELET # BLD AUTO: 231 K/UL (ref 150–450)
PMV BLD AUTO: 11.1 FL (ref 9.2–12.9)
POTASSIUM SERPL-SCNC: 4.4 MMOL/L (ref 3.5–5.1)
PROT SERPL-MCNC: 7.3 G/DL (ref 6–8.4)
RBC # BLD AUTO: 4.18 M/UL (ref 4–5.4)
SODIUM SERPL-SCNC: 138 MMOL/L (ref 136–145)
TRIGL SERPL-MCNC: 46 MG/DL (ref 30–150)
WBC # BLD AUTO: 11.82 K/UL (ref 3.9–12.7)

## 2023-03-02 PROCEDURE — G0378 HOSPITAL OBSERVATION PER HR: HCPCS

## 2023-03-02 PROCEDURE — 25500020 PHARM REV CODE 255: Performed by: INTERNAL MEDICINE

## 2023-03-02 PROCEDURE — 94761 N-INVAS EAR/PLS OXIMETRY MLT: CPT

## 2023-03-02 PROCEDURE — 99204 OFFICE O/P NEW MOD 45 MIN: CPT | Mod: ,,, | Performed by: SURGERY

## 2023-03-02 PROCEDURE — 84478 ASSAY OF TRIGLYCERIDES: CPT | Performed by: SURGERY

## 2023-03-02 PROCEDURE — 96375 TX/PRO/DX INJ NEW DRUG ADDON: CPT

## 2023-03-02 PROCEDURE — 85025 COMPLETE CBC W/AUTO DIFF WBC: CPT | Performed by: SURGERY

## 2023-03-02 PROCEDURE — 63600175 PHARM REV CODE 636 W HCPCS: Performed by: INTERNAL MEDICINE

## 2023-03-02 PROCEDURE — 25000003 PHARM REV CODE 250: Performed by: SURGERY

## 2023-03-02 PROCEDURE — 25000003 PHARM REV CODE 250: Performed by: INTERNAL MEDICINE

## 2023-03-02 PROCEDURE — 96366 THER/PROPH/DIAG IV INF ADDON: CPT

## 2023-03-02 PROCEDURE — 80053 COMPREHEN METABOLIC PANEL: CPT | Performed by: SURGERY

## 2023-03-02 PROCEDURE — 99204 PR OFFICE/OUTPT VISIT, NEW, LEVL IV, 45-59 MIN: ICD-10-PCS | Mod: ,,, | Performed by: SURGERY

## 2023-03-02 PROCEDURE — 96376 TX/PRO/DX INJ SAME DRUG ADON: CPT

## 2023-03-02 RX ORDER — ESCITALOPRAM OXALATE 10 MG/1
20 TABLET ORAL DAILY
Status: DISCONTINUED | OUTPATIENT
Start: 2023-03-02 | End: 2023-03-08 | Stop reason: HOSPADM

## 2023-03-02 RX ORDER — SIMETHICONE 80 MG
1 TABLET,CHEWABLE ORAL 4 TIMES DAILY PRN
Status: DISCONTINUED | OUTPATIENT
Start: 2023-03-02 | End: 2023-03-08 | Stop reason: HOSPADM

## 2023-03-02 RX ORDER — AMOXICILLIN 250 MG
1 CAPSULE ORAL 2 TIMES DAILY PRN
Status: DISCONTINUED | OUTPATIENT
Start: 2023-03-02 | End: 2023-03-08 | Stop reason: HOSPADM

## 2023-03-02 RX ORDER — LEVOTHYROXINE SODIUM 100 UG/1
100 TABLET ORAL
Status: DISCONTINUED | OUTPATIENT
Start: 2023-03-02 | End: 2023-03-08 | Stop reason: HOSPADM

## 2023-03-02 RX ORDER — NALOXONE HCL 0.4 MG/ML
0.02 VIAL (ML) INJECTION
Status: DISCONTINUED | OUTPATIENT
Start: 2023-03-02 | End: 2023-03-08 | Stop reason: HOSPADM

## 2023-03-02 RX ORDER — SODIUM CHLORIDE 0.9 % (FLUSH) 0.9 %
10 SYRINGE (ML) INJECTION
Status: DISCONTINUED | OUTPATIENT
Start: 2023-03-02 | End: 2023-03-08 | Stop reason: HOSPADM

## 2023-03-02 RX ORDER — PANTOPRAZOLE SODIUM 40 MG/1
40 TABLET, DELAYED RELEASE ORAL DAILY
Status: DISCONTINUED | OUTPATIENT
Start: 2023-03-02 | End: 2023-03-03

## 2023-03-02 RX ORDER — IBUPROFEN 200 MG
24 TABLET ORAL
Status: DISCONTINUED | OUTPATIENT
Start: 2023-03-02 | End: 2023-03-08 | Stop reason: HOSPADM

## 2023-03-02 RX ORDER — HYDROMORPHONE HYDROCHLORIDE 1 MG/ML
1 INJECTION, SOLUTION INTRAMUSCULAR; INTRAVENOUS; SUBCUTANEOUS EVERY 6 HOURS PRN
Status: DISCONTINUED | OUTPATIENT
Start: 2023-03-02 | End: 2023-03-03

## 2023-03-02 RX ORDER — IBUPROFEN 200 MG
16 TABLET ORAL
Status: DISCONTINUED | OUTPATIENT
Start: 2023-03-02 | End: 2023-03-08 | Stop reason: HOSPADM

## 2023-03-02 RX ORDER — ONDANSETRON 2 MG/ML
4 INJECTION INTRAMUSCULAR; INTRAVENOUS EVERY 6 HOURS PRN
Status: DISCONTINUED | OUTPATIENT
Start: 2023-03-02 | End: 2023-03-08 | Stop reason: HOSPADM

## 2023-03-02 RX ORDER — SODIUM CHLORIDE, SODIUM LACTATE, POTASSIUM CHLORIDE, CALCIUM CHLORIDE 600; 310; 30; 20 MG/100ML; MG/100ML; MG/100ML; MG/100ML
INJECTION, SOLUTION INTRAVENOUS CONTINUOUS
Status: DISCONTINUED | OUTPATIENT
Start: 2023-03-02 | End: 2023-03-07

## 2023-03-02 RX ORDER — INSULIN ASPART 100 [IU]/ML
1-10 INJECTION, SOLUTION INTRAVENOUS; SUBCUTANEOUS
Status: DISCONTINUED | OUTPATIENT
Start: 2023-03-02 | End: 2023-03-08 | Stop reason: HOSPADM

## 2023-03-02 RX ORDER — GLUCAGON 1 MG
1 KIT INJECTION
Status: DISCONTINUED | OUTPATIENT
Start: 2023-03-02 | End: 2023-03-08 | Stop reason: HOSPADM

## 2023-03-02 RX ORDER — ACETAMINOPHEN 325 MG/1
650 TABLET ORAL EVERY 8 HOURS PRN
Status: DISCONTINUED | OUTPATIENT
Start: 2023-03-02 | End: 2023-03-08 | Stop reason: HOSPADM

## 2023-03-02 RX ORDER — SODIUM CHLORIDE 9 MG/ML
INJECTION, SOLUTION INTRAVENOUS CONTINUOUS
Status: DISCONTINUED | OUTPATIENT
Start: 2023-03-02 | End: 2023-03-02

## 2023-03-02 RX ORDER — TALC
6 POWDER (GRAM) TOPICAL NIGHTLY PRN
Status: DISCONTINUED | OUTPATIENT
Start: 2023-03-02 | End: 2023-03-08 | Stop reason: HOSPADM

## 2023-03-02 RX ORDER — POLYETHYLENE GLYCOL 3350 17 G/17G
17 POWDER, FOR SOLUTION ORAL 2 TIMES DAILY PRN
Status: DISCONTINUED | OUTPATIENT
Start: 2023-03-02 | End: 2023-03-08 | Stop reason: HOSPADM

## 2023-03-02 RX ORDER — LISINOPRIL 10 MG/1
10 TABLET ORAL DAILY
Status: DISCONTINUED | OUTPATIENT
Start: 2023-03-02 | End: 2023-03-08 | Stop reason: HOSPADM

## 2023-03-02 RX ADMIN — SODIUM CHLORIDE, SODIUM LACTATE, POTASSIUM CHLORIDE, AND CALCIUM CHLORIDE 1000 ML: .6; .31; .03; .02 INJECTION, SOLUTION INTRAVENOUS at 08:03

## 2023-03-02 RX ADMIN — SODIUM CHLORIDE: 0.9 INJECTION, SOLUTION INTRAVENOUS at 11:03

## 2023-03-02 RX ADMIN — PIPERACILLIN AND TAZOBACTAM 4.5 G: 4; .5 INJECTION, POWDER, LYOPHILIZED, FOR SOLUTION INTRAVENOUS; PARENTERAL at 05:03

## 2023-03-02 RX ADMIN — ONDANSETRON 4 MG: 2 INJECTION INTRAMUSCULAR; INTRAVENOUS at 06:03

## 2023-03-02 RX ADMIN — ACETAMINOPHEN 650 MG: 325 TABLET ORAL at 11:03

## 2023-03-02 RX ADMIN — SODIUM CHLORIDE, SODIUM LACTATE, POTASSIUM CHLORIDE, AND CALCIUM CHLORIDE: .6; .31; .03; .02 INJECTION, SOLUTION INTRAVENOUS at 09:03

## 2023-03-02 RX ADMIN — PIPERACILLIN AND TAZOBACTAM 4.5 G: 4; .5 INJECTION, POWDER, LYOPHILIZED, FOR SOLUTION INTRAVENOUS; PARENTERAL at 09:03

## 2023-03-02 RX ADMIN — HYDROMORPHONE HYDROCHLORIDE 1 MG: 1 INJECTION, SOLUTION INTRAMUSCULAR; INTRAVENOUS; SUBCUTANEOUS at 06:03

## 2023-03-02 RX ADMIN — LISINOPRIL 10 MG: 2.5 TABLET ORAL at 08:03

## 2023-03-02 RX ADMIN — LEVOTHYROXINE SODIUM 100 MCG: 0.1 TABLET ORAL at 05:03

## 2023-03-02 RX ADMIN — ONDANSETRON 4 MG: 2 INJECTION INTRAMUSCULAR; INTRAVENOUS at 05:03

## 2023-03-02 RX ADMIN — PANTOPRAZOLE SODIUM 40 MG: 40 TABLET, DELAYED RELEASE ORAL at 05:03

## 2023-03-02 RX ADMIN — HYDROMORPHONE HYDROCHLORIDE 1 MG: 1 INJECTION, SOLUTION INTRAMUSCULAR; INTRAVENOUS; SUBCUTANEOUS at 11:03

## 2023-03-02 RX ADMIN — HYDROMORPHONE HYDROCHLORIDE 1 MG: 1 INJECTION, SOLUTION INTRAMUSCULAR; INTRAVENOUS; SUBCUTANEOUS at 04:03

## 2023-03-02 RX ADMIN — ESCITALOPRAM OXALATE 20 MG: 10 TABLET ORAL at 08:03

## 2023-03-02 RX ADMIN — PIPERACILLIN AND TAZOBACTAM 4.5 G: 4; .5 INJECTION, POWDER, LYOPHILIZED, FOR SOLUTION INTRAVENOUS; PARENTERAL at 02:03

## 2023-03-02 RX ADMIN — IOHEXOL 100 ML: 350 INJECTION, SOLUTION INTRAVENOUS at 02:03

## 2023-03-02 NOTE — H&P
[facility] - Emergency Dept  Hospital Medicine  History & Physical    Patient Name: Lavinia Herrera  MRN: [id number]  Patient Class: OP- Observation  Admission Date: 3/1/2023  Attending Physician: Zoey Salazar MD   Primary Care Provider: Oniel Malik MD         Patient information was obtained from patient, past medical records and ER records.     Subjective:     Principal Problem:Cholelithiasis with acute cholecystitis    Chief Complaint:   Chief Complaint   Patient presents with    Abdominal Pain     Diffuse abd pain x days that radiates into her armpits and chest. BM today. Denies dysuria.         HPI: Patient is a 48-year-old female, who presents emergency room complaining of abdominal pain.  Patient states that she is had abdominal pain for 2 days worsening without relieving factors.  She denies any nausea or vomiting.  No fevers chills.  Her past medical significant for hypothyroidism, diabetes mellitus and elevated blood pressure.  Patient denies any chest pains.  No shortness breath.    Plan to place patient in observation.  Consult general surgery.  Obtain an MRCP.  Continue IV antibiotics.  Plan per surgery    Elevated transaminase and elevated bilirubin most likely caused from cholelithiasis.  Elevated blood pressure probably related to pain discomfort will add ACE inhibitor for renal protection for diabetes and blood pressure control      Past Medical History:   Diagnosis Date    Allergy     Diabetes mellitus, type 2     GERD (gastroesophageal reflux disease)     Hypothyroidism     Morbid obesity     Otitis media     Seasonal allergies        Past Surgical History:   Procedure Laterality Date    MANDIBLE OSTEOTOMY      TYMPANOSTOMY TUBE PLACEMENT  6/30/14       Review of patient's allergies indicates:  No Known Allergies    No current facility-administered medications on file prior to encounter.     Current Outpatient Medications on File Prior to Encounter   Medication Sig  "   allopurinoL (ZYLOPRIM) 100 MG tablet Take 1 tablet (100 mg total) by mouth once daily.    ascorbic acid, vitamin C, (VITAMIN C) 500 MG tablet Take 500 mg by mouth once daily.    EScitalopram oxalate (LEXAPRO) 20 MG tablet Take 1 tablet (20 mg total) by mouth once daily.    ezetimibe (ZETIA) 10 mg tablet Take 1 tablet (10 mg total) by mouth once daily. Cholesterol    ferrous fumarate 456 mg (150 mg iron) Tab Take by mouth.    ibuprofen (ADVIL,MOTRIN) 800 MG tablet Take 800 mg by mouth every 6 (six) hours as needed.    indomethacin (INDOCIN) 25 MG capsule Take 1 capsule (25 mg total) by mouth 2 (two) times daily as needed.    levonorgestrel (MIRENA) 20 mcg/24 hr (5 years) IUD 1 each by Intrauterine route once.    levothyroxine (SYNTHROID) 100 MCG tablet Take 1 tablet (100 mcg total) by mouth before breakfast.    pantoprazole (PROTONIX) 40 MG tablet TAKE 1 TABLET(40 MG) BY MOUTH EVERY DAY    pen needle, diabetic (COMFORT EZ PEN NEEDLES) 32 gauge x 5/32" Ndle Use 1x weekly.    tirzepatide 5 mg/0.5 mL PnIj INJECT 5 MG INTO THE SKIN ONCE WEEKLY.     Family History       Problem Relation (Age of Onset)    Arthritis Mother    Asthma Mother    Diabetes Maternal Grandmother    Heart disease Maternal Grandmother    Hypertension Mother          Tobacco Use    Smoking status: Former     Types: Cigarettes     Quit date: 4/3/1998     Years since quittin.9    Smokeless tobacco: Never   Substance and Sexual Activity    Alcohol use: Yes     Comment: occasional    Drug use: No    Sexual activity: Yes     Partners: Male     Birth control/protection: I.U.D.     Review of Systems   Constitutional: Negative.    HENT: Negative.     Eyes: Negative.    Respiratory: Negative.     Cardiovascular: Negative.    Gastrointestinal:  Positive for abdominal pain.   Endocrine: Negative.    Genitourinary: Negative.    Musculoskeletal: Negative.    Allergic/Immunologic: Negative.    Neurological: Negative.    Hematological: " Negative.    Objective:     Vital Signs (Most Recent):  Temp: 98.2 °F (36.8 °C) (03/01/23 1745)  Pulse: 60 (03/01/23 2205)  Resp: 20 (03/01/23 2205)  BP: (!) 155/71 (03/01/23 2205)  SpO2: (!) 94 % (03/01/23 2205)   Vital Signs (24h Range):  Temp:  [98.2 °F (36.8 °C)] 98.2 °F (36.8 °C)  Pulse:  [51-60] 60  Resp:  [15-20] 20  SpO2:  [94 %-98 %] 94 %  BP: (155-189)/(71-94) 155/71     Weight: (!) 199.6 kg (440 lb)  Body mass index is 68.91 kg/m².    Physical Exam  Constitutional:       Appearance: She is obese.      Comments: Morbidly obese   HENT:      Head: Normocephalic and atraumatic.   Eyes:      Pupils: Pupils are equal, round, and reactive to light.   Cardiovascular:      Rate and Rhythm: Normal rate and regular rhythm.   Pulmonary:      Effort: Pulmonary effort is normal.      Breath sounds: Normal breath sounds.   Abdominal:      Tenderness: There is abdominal tenderness.   Musculoskeletal:      Comments: Excoriation on left foot   Skin:     General: Skin is warm and dry.      Capillary Refill: Capillary refill takes less than 2 seconds.   Neurological:      General: No focal deficit present.      Mental Status: She is alert.         CRANIAL NERVES     CN III, IV, VI   Pupils are equal, round, and reactive to light.     Significant Labs: All pertinent labs within the past 24 hours have been reviewed.    Significant Imaging: I have reviewed all pertinent imaging results/findings within the past 24 hours.    Assessment/Plan:     * Cholelithiasis with acute cholecystitis  Reason for admission  Acute cholecystitis  Transaminases elevated  Hyperbilirubinemia  All probably related to cholecystitis and cholelithiasis  Consult general surgery  IV antibiotics      Diabetes mellitus  Patient's FSGs are uncontrolled due to hyperglycemia on current medication regimen.  Last A1c reviewed-   Lab Results   Component Value Date    HGBA1C 7.1 (H) 11/02/2022     Most recent fingerstick glucose reviewed- No results for input(s):  POCTGLUCOSE in the last 24 hours.  Current correctional scale  Medium  Increase anti-hyperglycemic dose as follows-   Antihyperglycemics (From admission, onward)    None        Hold Oral hypoglycemics while patient is in the hospital.    Hypothyroidism  Obtain TSH  Treat as indicated        Morbid obesity with BMI of 60.0-69.9, adult  Body mass index is 68.91 kg/m². Morbid obesity complicates all aspects of disease management from diagnostic modalities to treatment. Weight loss encouraged and health benefits explained to patient.           VTE Risk Mitigation (From admission, onward)    None             Zoey Salazar MD  Department of Hospital Medicine   [facility] - Emergency Dept

## 2023-03-02 NOTE — SUBJECTIVE & OBJECTIVE
"Past Medical History:   Diagnosis Date    Allergy     Diabetes mellitus, type 2     GERD (gastroesophageal reflux disease)     Hypothyroidism     Morbid obesity     Otitis media     Seasonal allergies        Past Surgical History:   Procedure Laterality Date    MANDIBLE OSTEOTOMY      TYMPANOSTOMY TUBE PLACEMENT  13       Review of patient's allergies indicates:  No Known Allergies    No current facility-administered medications on file prior to encounter.     Current Outpatient Medications on File Prior to Encounter   Medication Sig    allopurinoL (ZYLOPRIM) 100 MG tablet Take 1 tablet (100 mg total) by mouth once daily.    ascorbic acid, vitamin C, (VITAMIN C) 500 MG tablet Take 500 mg by mouth once daily.    EScitalopram oxalate (LEXAPRO) 20 MG tablet Take 1 tablet (20 mg total) by mouth once daily.    ezetimibe (ZETIA) 10 mg tablet Take 1 tablet (10 mg total) by mouth once daily. Cholesterol    ferrous fumarate 456 mg (150 mg iron) Tab Take by mouth.    ibuprofen (ADVIL,MOTRIN) 800 MG tablet Take 800 mg by mouth every 6 (six) hours as needed.    indomethacin (INDOCIN) 25 MG capsule Take 1 capsule (25 mg total) by mouth 2 (two) times daily as needed.    levonorgestrel (MIRENA) 20 mcg/24 hr (5 years) IUD 1 each by Intrauterine route once.    levothyroxine (SYNTHROID) 100 MCG tablet Take 1 tablet (100 mcg total) by mouth before breakfast.    pantoprazole (PROTONIX) 40 MG tablet TAKE 1 TABLET(40 MG) BY MOUTH EVERY DAY    pen needle, diabetic (COMFORT EZ PEN NEEDLES) 32 gauge x 5/32" Ndle Use 1x weekly.    tirzepatide 5 mg/0.5 mL PnIj INJECT 5 MG INTO THE SKIN ONCE WEEKLY.     Family History       Problem Relation (Age of Onset)    Arthritis Mother    Asthma Mother    Diabetes Maternal Grandmother    Heart disease Maternal Grandmother    Hypertension Mother          Tobacco Use    Smoking status: Former     Types: Cigarettes     Quit date: 4/3/1998     Years since quittin.9    Smokeless tobacco: Never "   Substance and Sexual Activity    Alcohol use: Yes     Comment: occasional    Drug use: No    Sexual activity: Yes     Partners: Male     Birth control/protection: I.U.D.     Review of Systems   Constitutional: Negative.    HENT: Negative.     Eyes: Negative.    Respiratory: Negative.     Cardiovascular: Negative.    Gastrointestinal:  Positive for abdominal pain.   Endocrine: Negative.    Genitourinary: Negative.    Musculoskeletal: Negative.    Allergic/Immunologic: Negative.    Neurological: Negative.    Hematological: Negative.    Objective:     Vital Signs (Most Recent):  Temp: 98.2 °F (36.8 °C) (03/01/23 1745)  Pulse: 60 (03/01/23 2205)  Resp: 20 (03/01/23 2205)  BP: (!) 155/71 (03/01/23 2205)  SpO2: (!) 94 % (03/01/23 2205)   Vital Signs (24h Range):  Temp:  [98.2 °F (36.8 °C)] 98.2 °F (36.8 °C)  Pulse:  [51-60] 60  Resp:  [15-20] 20  SpO2:  [94 %-98 %] 94 %  BP: (155-189)/(71-94) 155/71     Weight: (!) 199.6 kg (440 lb)  Body mass index is 68.91 kg/m².    Physical Exam  Constitutional:       Appearance: She is obese.      Comments: Morbidly obese   HENT:      Head: Normocephalic and atraumatic.   Eyes:      Pupils: Pupils are equal, round, and reactive to light.   Cardiovascular:      Rate and Rhythm: Normal rate and regular rhythm.   Pulmonary:      Effort: Pulmonary effort is normal.      Breath sounds: Normal breath sounds.   Abdominal:      Tenderness: There is abdominal tenderness.   Musculoskeletal:      Comments: Excoriation on left foot   Skin:     General: Skin is warm and dry.      Capillary Refill: Capillary refill takes less than 2 seconds.   Neurological:      General: No focal deficit present.      Mental Status: She is alert.         CRANIAL NERVES     CN III, IV, VI   Pupils are equal, round, and reactive to light.     Significant Labs: All pertinent labs within the past 24 hours have been reviewed.    Significant Imaging: I have reviewed all pertinent imaging results/findings within the  past 24 hours.

## 2023-03-02 NOTE — HPI
Patient is a 48-year-old female, who presents emergency room complaining of abdominal pain.  Patient states that she is had abdominal pain for 2 days worsening without relieving factors.  She denies any nausea or vomiting.  No fevers chills.  Her past medical significant for hypothyroidism, diabetes mellitus and elevated blood pressure.  Patient denies any chest pains.  No shortness breath.    Plan to place patient in observation.  Consult general surgery.  Obtain an MRCP.  Continue IV antibiotics.  Plan per surgery    Elevated transaminase and elevated bilirubin most likely caused from cholelithiasis.  Elevated blood pressure probably related to pain discomfort will add ACE inhibitor for renal protection for diabetes and blood pressure control

## 2023-03-02 NOTE — ASSESSMENT & PLAN NOTE
Patient's FSGs are uncontrolled due to hyperglycemia on current medication regimen.  Last A1c reviewed-   Lab Results   Component Value Date    HGBA1C 7.1 (H) 11/02/2022     Most recent fingerstick glucose reviewed- No results for input(s): POCTGLUCOSE in the last 24 hours.  Current correctional scale  Medium  Increase anti-hyperglycemic dose as follows-   Antihyperglycemics (From admission, onward)    None        Hold Oral hypoglycemics while patient is in the hospital.

## 2023-03-02 NOTE — CONSULTS
7x4x0.1cm pink yellow fibrous wound bed, states injuried a couple of weeks ago, was much use.  Has been using antibiotic ointment. Swelling to foot Cleaned and dried and received orders for Medihoney.  Dressed with Medihoney, adaptic, abd pad, kerlix.

## 2023-03-02 NOTE — ED PROVIDER NOTES
Encounter Date: 3/1/2023       History     Chief Complaint   Patient presents with    Abdominal Pain     Diffuse abd pain x days that radiates into her armpits and chest. BM today. Denies dysuria.      This is a 48-year-old female with history of hypertension, hyperlipidemia, diabetes, GERD, morbid obesity who comes in complaining of epigastric abdominal pain that radiates to her back and chest.  Patient reports that it started 2 days ago.  Today it got significantly worse.  She reports shortness of breath with it.  She denies any weakness or dizziness.  No nausea or vomiting.  Patient reports normal bowel movement today.  She denies any dysuria or hematuria.  She denies any exacerbating or alleviating factors otherwise.    Review of patient's allergies indicates:  No Known Allergies  Past Medical History:   Diagnosis Date    Allergy     Diabetes mellitus, type 2     GERD (gastroesophageal reflux disease)     Hypothyroidism     Morbid obesity     Otitis media     Seasonal allergies      Past Surgical History:   Procedure Laterality Date    MANDIBLE OSTEOTOMY      TYMPANOSTOMY TUBE PLACEMENT  13     Family History   Problem Relation Age of Onset    Hypertension Mother     Asthma Mother     Arthritis Mother     Diabetes Maternal Grandmother     Heart disease Maternal Grandmother         CHF    Breast cancer Neg Hx     Colon cancer Neg Hx     Ovarian cancer Neg Hx     Cancer Neg Hx     Allergic rhinitis Neg Hx     Allergies Neg Hx     Angioedema Neg Hx     Atopy Neg Hx     Eczema Neg Hx     Immunodeficiency Neg Hx     Rhinitis Neg Hx     Urticaria Neg Hx     Lupus Neg Hx     Psoriasis Neg Hx     Melanoma Neg Hx      Social History     Tobacco Use    Smoking status: Former     Types: Cigarettes     Quit date: 4/3/1998     Years since quittin.9    Smokeless tobacco: Never   Substance Use Topics    Alcohol use: Yes     Comment: occasional    Drug use: No     Review of Systems   Constitutional:  Negative for  chills and fever.   HENT:  Negative for congestion, sore throat and trouble swallowing.    Respiratory:  Positive for shortness of breath. Negative for cough.    Cardiovascular:  Positive for chest pain. Negative for palpitations.   Gastrointestinal:  Positive for abdominal pain. Negative for diarrhea, nausea and vomiting.   Genitourinary:  Negative for dysuria and flank pain.   Musculoskeletal:  Negative for back pain and neck pain.   Neurological:  Negative for weakness, numbness and headaches.   Psychiatric/Behavioral:  Negative for agitation and confusion.    All other systems reviewed and are negative.    Physical Exam     Initial Vitals [03/01/23 1745]   BP Pulse Resp Temp SpO2   (!) 189/94 (!) 51 15 98.2 °F (36.8 °C) 98 %      MAP       --         Physical Exam    Nursing note and vitals reviewed.  Constitutional: Vital signs are normal. She appears well-developed and well-nourished.  Non-toxic appearance. No distress.   HENT:   Head: Normocephalic and atraumatic.   Mouth/Throat: Oropharynx is clear and moist.   Eyes: Conjunctivae and EOM are normal. Pupils are equal, round, and reactive to light.   Neck: Neck supple.   Normal range of motion.  Cardiovascular:  Normal rate, regular rhythm and intact distal pulses.           Pulmonary/Chest: Breath sounds normal.   Abdominal: Abdomen is soft. Bowel sounds are normal. There is abdominal tenderness.   Mild epigastric tenderness to palpation   Musculoskeletal:         General: No tenderness or edema. Normal range of motion.      Cervical back: Normal range of motion and neck supple. No rigidity. No muscular tenderness.     Lymphadenopathy:     She has no cervical adenopathy.     She has no axillary adenopathy.   Neurological: She is alert and oriented to person, place, and time. She has normal strength. No cranial nerve deficit or sensory deficit. Gait normal.   Skin: Skin is warm, dry and intact.   Psychiatric: She has a normal mood and affect. Her behavior is  normal.       ED Course   Procedures  Labs Reviewed   CBC W/ AUTO DIFFERENTIAL - Abnormal; Notable for the following components:       Result Value    Hemoglobin 11.8 (*)     MCHC 31.2 (*)     RDW 15.0 (*)     Lymph # 0.8 (*)     Gran % 83.0 (*)     Lymph % 8.9 (*)     All other components within normal limits   COMPREHENSIVE METABOLIC PANEL - Abnormal; Notable for the following components:    Sodium 134 (*)     Glucose 185 (*)     Calcium 8.6 (*)     Total Bilirubin 2.6 (*)     Alkaline Phosphatase 232 (*)      (*)      (*)     All other components within normal limits   LIPASE - Abnormal; Notable for the following components:    Lipase 3597 (*)     All other components within normal limits   URINALYSIS, REFLEX TO URINE CULTURE - Abnormal; Notable for the following components:    Specific Gravity, UA >1.030 (*)     Protein, UA 1+ (*)     Bilirubin (UA) 1+ (*)     Occult Blood UA 3+ (*)     Urobilinogen, UA 2.0-3.0 (*)     Leukocytes, UA Trace (*)     All other components within normal limits    Narrative:     Specimen Source->Urine   D DIMER, QUANTITATIVE - Abnormal; Notable for the following components:    D-Dimer 2.19 (*)     All other components within normal limits    Narrative:     DDimer critical result(s) called and verbal readback obtained from   Tequila Gandara (ER) by KIM 03/01/2023 21:03   URINALYSIS MICROSCOPIC - Abnormal; Notable for the following components:    RBC, UA 69 (*)     WBC, UA 6 (*)     Hyaline Casts, UA 7 (*)     All other components within normal limits    Narrative:     Specimen Source->Urine   MAGNESIUM   TROPONIN I HIGH SENSITIVITY   B-TYPE NATRIURETIC PEPTIDE   TROPONIN I HIGH SENSITIVITY   POCT URINE PREGNANCY   ISTAT CREATININE     EKG Readings: (Independently Interpreted)   Time: 1755  Rate: 56 bpm  Sinus bradycardia with sinus arrhythmia.  Nonspecific T-wave abnormality.  Unchanged from prior.     Imaging Results              X-Ray Chest AP Portable (In process)                       US Abdomen Limited (Gallbladder) (Final result)  Result time 03/01/23 20:09:48      Final result by Milvia Villanueva MD (03/01/23 20:09:48)                   Narrative:    EXAM DESCRIPTION:  US ABDOMEN LIMITED    CLINICAL HISTORY:  48 years  Female;  pain    TECHNIQUE: Abdominal ultrasound was performed.    COMPARISON: None.    FINDINGS:  Pancreas: The body the pancreas appears slightly hyperechoic. The head and tail are not well seen.  Liver: Liver is enlarged measuring 22.3 cm in length. There is increased echogenicity consistent with fatty infiltration. Portal vein is patent with hepatopedal flow. Overall the liver is not well seen..  Gallbladder: Gallstones are present in the gallbladder. The largest measures 3 cm. Positive sonographic Chaudhary's. Gallbladder wall is 1.2 mm. No pericholecystic fluid  Common bile duct: 4.7 mm.  Right kidney: The kidney measures 13.0 x 5.1 x 5.6 cm. Echogenicity and blood flow appear normal.. No hydronephrosis.  Aorta:Visualized portions are within normal limits.  IVC: Visualized portions are within normal limits.  Ascites:  No free fluid.    IMPRESSION:  1.  Cholelithiasis with positive sonographic Chaudhary's. No gallbladder wall thickening or pericholecystic fluid.  2.  Hepatomegaly with fatty infiltration of the liver.    Electronically signed by:  Milvia Villanueva MD  3/1/2023 8:09 PM [facility] Workstation: [id number]                                     Medications   piperacillin-tazobactam 4.5 g in dextrose 5 % 100 mL IVPB (ready to mix system) (4.5 g Intravenous New Bag 3/1/23 4531)   lactated ringers bolus 1,000 mL (has no administration in time range)   morphine injection 4 mg (has no administration in time range)   morphine injection 4 mg (4 mg Intravenous Given 3/1/23 2022)   ondansetron injection 4 mg (4 mg Intravenous Given 3/1/23 2022)     Medical Decision Making:   Initial Assessment:   This is a 48-year-old female with history of hypertension, hyperlipidemia,  diabetes, GERD, morbid obesity who comes in complaining of epigastric abdominal pain radiating to her back and chest as well as shortness of breath.  On evaluation she is hypertensive.  On physical exam she has mild epigastric tenderness to palpation.  Exam is normal otherwise.    Orders included EKG, CBC, CMP, troponin, BNP, chest x-ray, right upper quadrant ultrasound.  D-dimer was also ordered.  Differential Diagnosis:   Gastritis, pancreatitis, cholelithiasis, cholecystitis, ACS, aortic dissection, PE, arrhythmia.  Independently Interpreted Test(s):   I have ordered and independently interpreted X-rays - see prior notes.  I have ordered and independently interpreted EKG Reading(s) - see prior notes  Clinical Tests:   Lab Tests: Ordered and Reviewed  The following lab test(s) were unremarkable: UPT       <> Summary of Lab: Urine with increased specific gravity 1+ protein 1+ bilirubin 3+ blood 2-3 urobilinogen trace leukocytes, 69 red blood cells 6 white blood cells no bacteria 4 squamous  Point of care creatinine 0.8  Hemoglobin 11.8  Sodium 134 glucose 185 calcium 8.6 total bili 2.6 alk-phos 232 AST 40   Radiological Study: Ordered and Reviewed  Medical Tests: Ordered and Reviewed  ED Management:  Comorbidities contributing to patient's presentation include hypertension, hyperlipidemia, diabetes and morbid obesity.  I reviewed patient's prior medical records.  She has been on Mounjaro for weight control.    I have taken over care of this patient at the end of Dr. Mederos's shift while awaiting lab work and ultrasound of right upper quadrant.  Patient was re-evaluated she reports her pain is improved she still mildly nauseous but does not want any further pain medication or nausea medication.  She will also get 1 L of normal saline since she is NP  MDM    Patient presents for emergent evaluation of acute right upper quadrant epigastric abdominal pain nausea that poses a possible threat to life and/or bodily  function.    Differential diagnosis includes but is not limitedacute pancreatitis, acute cholecystitis and cholangitis, colitis, acute appendicitis, urinary tract infection, ovarian  torsion, PID, TOA, pyelonephritis, kidney stone, volvulus, small bowel obstruction, enteritis, gastritis, mesenteric ischemia, AAA, AAA rupture, aortic dissection    .  In the ED patient found to have acute cholelithiasis with developing cholecystitis possible biliary obstruction with new transaminitis and hyperbilirubinemia  I ordered labs and personally reviewed them.  Labs significant for see above  I ordered X-rays and personally reviewed them and reviewed the radiologist interpretation.  Xray significant for no acute abnormality  I ordered EKG and personally reviewed it.  EKG significant for see above interpretation by Dr. Mederos.    I ordered CT scan and personally reviewed it and reviewed the radiologist interpretation.  US abd limited significant for Cholelithiasis with positive sonographic Chaudhary's. No gallbladder wall thickening or pericholecystic fluid.   2.  Hepatomegaly with fatty infiltration of the liver.      Admission MDM  I discussed the patient presentation labs, ekg, X-rays, CT findings with the Hospitalist and consultant(s) Dr. Guerrero, gen surg, who request patient had MRCP for further evaluation would like patient NPO at midnight and antibiotics   Patient was managed in the ED with IV I have written for 1 L lactated Ringer's, Zosyn 4.5 g IV patient be kept NPO patient received morphine and Zofran earlier in her stay and reports pain is controlled.    The response to treatment was good.    Patient required emergent consultation to hospitalist for admission.  Erika Zamora M.D.     Other:   I have discussed this case with another health care provider.                        Clinical Impression:   Final diagnoses:  [R07.9] Chest pain  [R52] Pain  [K80.01] Calculus of gallbladder with acute cholecystitis and  obstruction (Primary)  [E80.6] Hyperbilirubinemia  [R74.01] Transaminitis        ED Disposition Condition    Admit Stable                Erika Zamora MD  03/01/23 2163       Erika Zamora MD  03/01/23 6718

## 2023-03-02 NOTE — PLAN OF CARE
[facility] - Emergency Dept  Initial Discharge Assessment       Primary Care Provider: Oniel Malik MD    Admission Diagnosis: Calculus of gallbladder with acute cholecystitis and obstruction [K80.01]    Admission Date: 3/1/2023  Expected Discharge Date:     Social work intern met with Pt at bedside to complete discharge assessment. Maldonado Herrera (Spouse)   [phone] (Mobile) present during the assessment. Pt AAOx4s. Demographics, PCP, and insurance verified. No home health. No dialysis. Pt reports ability to complete ADLs without assistance. Pt reports using CPAP at home. Pt verbalized plan to discharge home via family transport. Pt has no other needs to be addressed at this time.      Discharge Barriers Identified: None    Payor: BLUE CROSS BLUE SHIELD / [place] / Product Type: HMO /     Extended Emergency Contact Information  Primary Emergency Contact: Maldonado Herrera  Address: [address] [place] [address]           [place] [address] [facility]  Home Phone: [phone]  Mobile Phone: [phone]  Relation: Spouse  Preferred language: English   needed? No    Discharge Plan A: Home  Discharge Plan B: Home with family      [id number] - [place] - [address] AT [facility] [address]  [address] [place] [address]  Phone: [phone] Fax: [phone]      Initial Assessment (most recent)       Adult Discharge Assessment - 03/02/23 1012          Discharge Assessment    Assessment Type Discharge Planning Assessment     Confirmed/corrected address, phone number and insurance Yes     Confirmed Demographics Correct on Facesheet     Source of Information patient;family   Maldonado Herrera (Spouse)   [phone] (Mobile)    Does patient/caregiver understand observation status Yes     Communicated JOSE ANTONIO with patient/caregiver Date not available/Unable to determine     Reason For Admission Cholelithiasis with acute cholecystitis     People in  Home child(jimbo), adult;child(jimbo), dependent;spouse     Facility Arrived From: home     Do you expect to return to your current living situation? Yes     Do you have help at home or someone to help you manage your care at home? Yes     Who are your caregiver(s) and their phone number(s)? JavierMaldonado (Spouse)   [phone] (Mobile)     Equipment Currently Used at Home CPAP     Readmission within 30 days? No     Patient currently being followed by outpatient case management? No     Do you currently have service(s) that help you manage your care at home? No     Do you take prescription medications? Yes     Do you have prescription coverage? Yes     Do you have any problems affording any of your prescribed medications? No     Is the patient taking medications as prescribed? yes     Who is going to help you get home at discharge? Maldonado Herrera (Spouse)   [phone] (Mobile)     How do you get to doctors appointments? car, drives self     Are you on dialysis? No     Do you take coumadin? No     Discharge Plan A Home     Discharge Plan B Home with family     DME Needed Upon Discharge  none     Discharge Plan discussed with: Patient;Spouse/sig other     Name(s) and Number(s) Maldonado Herrera (Spouse)   [phone] (Mobile)     Discharge Barriers Identified None        Physical Activity    On average, how many days per week do you engage in moderate to strenuous exercise (like a brisk walk)? 0 days     On average, how many minutes do you engage in exercise at this level? 0 min        Financial Resource Strain    How hard is it for you to pay for the very basics like food, housing, medical care, and heating? Somewhat hard        Housing Stability    In the last 12 months, was there a time when you were not able to pay the mortgage or rent on time? No     In the last 12 months, how many places have you lived? 1     In the last 12 months, was there a time when you did not have a steady place to sleep or slept in a shelter (including  now)? No        Transportation Needs    In the past 12 months, has lack of transportation kept you from medical appointments or from getting medications? No        Food Insecurity    Within the past 12 months, you worried that your food would run out before you got the money to buy more. Never true     Within the past 12 months, the food you bought just didn't last and you didn't have money to get more. Never true        Stress    Do you feel stress - tense, restless, nervous, or anxious, or unable to sleep at night because your mind is troubled all the time - these days? To some extent        Social Connections    In a typical week, how many times do you talk on the phone with family, friends, or neighbors? More than three times a week     How often do you get together with friends or relatives? More than three times a week     How often do you attend Lutheran or Worship services? Never     Do you belong to any clubs or organizations such as Lutheran groups, unions, fraternal or athletic groups, or school groups? No     How often do you attend meetings of the clubs or organizations you belong to? Never     Are you , , , , never , or living with a partner?         Alcohol Use    Q1: How often do you have a drink containing alcohol? Monthly or less     Q2: How many drinks containing alcohol do you have on a typical day when you are drinking? 1 or 2     Q3: How often do you have six or more drinks on one occasion? Never        OTHER    Name(s) of People in Home Maldonado Herrera (Spouse)   [phone] (Mobile)

## 2023-03-02 NOTE — ASSESSMENT & PLAN NOTE
Body mass index is 68.91 kg/m². Morbid obesity complicates all aspects of disease management from diagnostic modalities to treatment. Weight loss encouraged and health benefits explained to patient.

## 2023-03-02 NOTE — CONSULTS
GENERAL SURGERY  INPATIENT CONSULT    REASON FOR CONSULT: Biliary pancreatitis, choledocholithiasis    HPI: Lavinia Herrera is a 48 y.o. female who presented the emergency room with 2 day history of worsening epigastric abdominal pain radiating to the back with associated nausea but no vomiting. In the emergency was found to have elevated LFTs and lipase, specifically total bili 2.6, alk-phos 232, , , lipase 3597. She underwent abdominal ultrasound which confirmed gallstones and a positive Chaudhary sign but no surrounding inflammation or ductal dilatation. Was admitted but is aborting in the ER.  MRCP was ordered however patient is too obese to fit therefore can not be obtained.  General surgery has been consulted for surgical evaluation.    Patient denies previous similar episodes.  Still with significant epigastric abdominal pain radiating to the back. No previous abdominal surgeries. No yellowing of the skin or eyes. No fevers or chills.    I have reviewed the patient's chart including prior progress notes, procedures and testing. Has type 2 diabetes, hypertension, hyperlipidemia, hypothyroidism and morbid obesity.    ROS:   Review of Systems    PROBLEM LIST:  Patient Active Problem List   Diagnosis    Snoring    Elevated blood pressure    Seasonal allergies    Impaired fasting blood sugar    GERD (gastroesophageal reflux disease)    Morbid obesity with BMI of 60.0-69.9, adult    Hypothyroidism    Grief    Diabetes mellitus    Cholelithiasis with acute cholecystitis         HISTORY  Past Medical History:   Diagnosis Date    Allergy     Diabetes mellitus, type 2     GERD (gastroesophageal reflux disease)     Hypothyroidism     Morbid obesity     Otitis media     Seasonal allergies        Past Surgical History:   Procedure Laterality Date    MANDIBLE OSTEOTOMY      TYMPANOSTOMY TUBE PLACEMENT  6/30/14       Social History     Tobacco Use    Smoking status: Former     Types: Cigarettes     Quit date:  "4/3/1998     Years since quittin.9    Smokeless tobacco: Never   Substance Use Topics    Alcohol use: Yes     Comment: occasional    Drug use: No       Family History   Problem Relation Age of Onset    Hypertension Mother     Asthma Mother     Arthritis Mother     Diabetes Maternal Grandmother     Heart disease Maternal Grandmother         CHF    Breast cancer Neg Hx     Colon cancer Neg Hx     Ovarian cancer Neg Hx     Cancer Neg Hx     Allergic rhinitis Neg Hx     Allergies Neg Hx     Angioedema Neg Hx     Atopy Neg Hx     Eczema Neg Hx     Immunodeficiency Neg Hx     Rhinitis Neg Hx     Urticaria Neg Hx     Lupus Neg Hx     Psoriasis Neg Hx     Melanoma Neg Hx          MEDS:  No current facility-administered medications on file prior to encounter.     Current Outpatient Medications on File Prior to Encounter   Medication Sig Dispense Refill    allopurinoL (ZYLOPRIM) 100 MG tablet Take 1 tablet (100 mg total) by mouth once daily. 30 tablet 11    ascorbic acid, vitamin C, (VITAMIN C) 500 MG tablet Take 500 mg by mouth once daily.      EScitalopram oxalate (LEXAPRO) 20 MG tablet Take 1 tablet (20 mg total) by mouth once daily. 90 tablet 0    ezetimibe (ZETIA) 10 mg tablet Take 1 tablet (10 mg total) by mouth once daily. Cholesterol 90 tablet 3    ferrous fumarate 456 mg (150 mg iron) Tab Take by mouth.      ibuprofen (ADVIL,MOTRIN) 800 MG tablet Take 800 mg by mouth every 6 (six) hours as needed.      indomethacin (INDOCIN) 25 MG capsule Take 1 capsule (25 mg total) by mouth 2 (two) times daily as needed. 20 capsule 0    levonorgestrel (MIRENA) 20 mcg/24 hr (5 years) IUD 1 each by Intrauterine route once.      levothyroxine (SYNTHROID) 100 MCG tablet Take 1 tablet (100 mcg total) by mouth before breakfast. 30 tablet 11    pantoprazole (PROTONIX) 40 MG tablet TAKE 1 TABLET(40 MG) BY MOUTH EVERY DAY 90 tablet 1    pen needle, diabetic (COMFORT EZ PEN NEEDLES) 32 gauge x 5/32" Ndle Use 1x weekly. 100 each 1    " tirzepatide 5 mg/0.5 mL PnIj INJECT 5 MG INTO THE SKIN ONCE WEEKLY. 4 pen 3       ALLERGIES:  Review of patient's allergies indicates:  No Known Allergies      VITALS:  Temp:  [98.1 °F (36.7 °C)-98.2 °F (36.8 °C)] 98.1 °F (36.7 °C)  Pulse:  [51-62] 60  Resp:  [15-24] 20  SpO2:  [92 %-98 %] 92 %  BP: (151-189)/(71-94) 158/79    I/O last 3 completed shifts:  In: 1000 [IV Piggyback:1000]  Out: -       PHYSICAL EXAM:  Physical Exam      LABS:  Lab Results   Component Value Date    WBC 8.95 03/01/2023    RBC 4.16 03/01/2023    HGB 11.8 (L) 03/01/2023    HCT 37.8 03/01/2023     03/01/2023     Lab Results   Component Value Date     (H) 03/01/2023     (L) 03/01/2023    K 4.9 03/01/2023    CL 99 03/01/2023    CO2 24 03/01/2023    BUN 11 03/01/2023    CREATININE 0.8 03/01/2023    CALCIUM 8.6 (L) 03/01/2023     Lab Results   Component Value Date     (H) 03/01/2023     (H) 03/01/2023    ALKPHOS 232 (H) 03/01/2023    BILITOT 2.6 (H) 03/01/2023     Lab Results   Component Value Date    MG 1.9 03/01/2023    PHOS 3.5 11/02/2022       STUDIES:  Images and reports were personally reviewed.    Abdominal ultrasound   FINDINGS:  Pancreas: The body the pancreas appears slightly hyperechoic. The head and tail are not well seen.  Liver: Liver is enlarged measuring 22.3 cm in length. There is increased echogenicity consistent with fatty infiltration. Portal vein is patent with hepatopedal flow. Overall the liver is not well seen..  Gallbladder: Gallstones are present in the gallbladder. The largest measures 3 cm. Positive sonographic Chaudhary's. Gallbladder wall is 1.2 mm. No pericholecystic fluid  Common bile duct: 4.7 mm.  Right kidney: The kidney measures 13.0 x 5.1 x 5.6 cm. Echogenicity and blood flow appear normal.. No hydronephrosis.  Aorta:Visualized portions are within normal limits.  IVC: Visualized portions are within normal limits.  Ascites:  No free fluid.     IMPRESSION:  1.  Cholelithiasis with  positive sonographic Chaudhary's. No gallbladder wall thickening or pericholecystic fluid.  2.  Hepatomegaly with fatty infiltration of the liver.      ASSESSMENT & PLAN:  48 y.o. female with epigastric abdominal pain, transaminitis, elevated lipase morbid obesity  -history and findings very consistent with biliary pancreatitis/choledocholithiasis  -unable to obtain MRCP due to body habitus  -repeat LFTs have been ordered, if bilirubin is increasing may need to consider GI consult for ERCP, if bilirubin is decreasing we could assume stone has spontaneously passed and consider for cholecystectomy +/-cholangiogram  -she does still have significant epigastric abdominal pain and it may be beneficial to wait until abdominal pain improves prior to cholecystectomy however will re-evaluate once labs are back  -continue NPO status, IV fluids written for 125 cc/hour  -p.r.n. pain control and antiemetics  -okay to continue Zosyn

## 2023-03-02 NOTE — CARE UPDATE
Patient interviewed in presence of her .  Patient reports upper abdominal pain mostly on the right side.  Patient is unable to undergo MRCP and CT angiogram of the chest due to body habitus.  V/Q scan results are pending.  GI specialist is being consulted for ERCP for biliary pancreatitis and CBD exploration.  Patient will later undergo laparoscopic cholecystectomy for definitive treatment.  Patient and her  are in agreement with plan of care.  Continuing with intravenous Zosyn antibiotic therapy.

## 2023-03-02 NOTE — PROGRESS NOTES
Pharmacist Dose Adjustment Note    Lavinia Herrera is a 48 y.o. female being treated with Zosyn.    Patient Data:    Vital Signs (Most Recent):  Temp: 98.1 °F (36.7 °C) (03/02/23 0000)  Pulse: 62 (03/02/23 0200)  Resp: 20 (03/02/23 0200)  BP: (!) 151/76 (03/02/23 0200)  SpO2: (!) 93 % (03/02/23 0200)   Vital Signs (72h Range):  Temp:  [98.1 °F (36.7 °C)-98.2 °F (36.8 °C)]   Pulse:  [51-62]   Resp:  [15-24]   BP: (151-189)/(71-94)   SpO2:  [93 %-98 %]      Recent Labs   Lab 03/01/23 1942   CREATININE 0.8     Serum creatinine: 0.8 mg/dL 03/01/23 1942  Estimated creatinine clearance: 156.8 mL/min    Zosyn 4.5 g every 6 hours will be changed to Zosyn 4.5 g every 8 hours per [facility] renal dosing adjustment protocol.    Pharmacist's Name: Cheryle Fu  Pharmacist's Extension: [phone]

## 2023-03-02 NOTE — PLAN OF CARE
Problem: Adult Inpatient Plan of Care  Goal: Plan of Care Review  Outcome: Ongoing, Progressing  Goal: Patient-Specific Goal (Individualized)  Outcome: Ongoing, Progressing  Goal: Absence of Hospital-Acquired Illness or Injury  Outcome: Ongoing, Progressing  Goal: Optimal Comfort and Wellbeing  Outcome: Ongoing, Progressing  Goal: Readiness for Transition of Care  Outcome: Ongoing, Progressing     Problem: Bariatric Environmental Safety  Goal: Safety Maintained with Care  Outcome: Ongoing, Progressing     Problem: Skin Injury Risk Increased  Goal: Skin Health and Integrity  Outcome: Ongoing, Progressing     Problem: Diabetes Comorbidity  Goal: Blood Glucose Level Within Targeted Range  Outcome: Ongoing, Progressing     Problem: Adult Inpatient Plan of Care  Goal: Plan of Care Review  Outcome: Ongoing, Progressing  Goal: Patient-Specific Goal (Individualized)  Outcome: Ongoing, Progressing  Goal: Absence of Hospital-Acquired Illness or Injury  Outcome: Ongoing, Progressing  Goal: Optimal Comfort and Wellbeing  Outcome: Ongoing, Progressing  Goal: Readiness for Transition of Care  Outcome: Ongoing, Progressing     Problem: Bariatric Environmental Safety  Goal: Safety Maintained with Care  Outcome: Ongoing, Progressing     Problem: Skin Injury Risk Increased  Goal: Skin Health and Integrity  Outcome: Ongoing, Progressing     Problem: Diabetes Comorbidity  Goal: Blood Glucose Level Within Targeted Range  Outcome: Ongoing, Progressing

## 2023-03-02 NOTE — ASSESSMENT & PLAN NOTE
Reason for admission  Acute cholecystitis  Transaminases elevated  Hyperbilirubinemia  All probably related to cholecystitis and cholelithiasis  Consult general surgery  IV antibiotics

## 2023-03-03 ENCOUNTER — ANESTHESIA (OUTPATIENT)
Dept: SURGERY | Facility: HOSPITAL | Age: 48
DRG: 417 | End: 2023-03-03
Payer: COMMERCIAL

## 2023-03-03 ENCOUNTER — ANESTHESIA EVENT (OUTPATIENT)
Dept: SURGERY | Facility: HOSPITAL | Age: 48
DRG: 417 | End: 2023-03-03
Payer: COMMERCIAL

## 2023-03-03 PROBLEM — T88.4XXA HARD TO INTUBATE: Status: ACTIVE | Noted: 2023-03-03

## 2023-03-03 LAB
ALBUMIN SERPL BCP-MCNC: 3 G/DL (ref 3.5–5.2)
ALP SERPL-CCNC: 186 U/L (ref 55–135)
ALT SERPL W/O P-5'-P-CCNC: 220 U/L (ref 10–44)
ANION GAP SERPL CALC-SCNC: 8 MMOL/L (ref 8–16)
AST SERPL-CCNC: 109 U/L (ref 10–40)
BASOPHILS # BLD AUTO: 0.05 K/UL (ref 0–0.2)
BASOPHILS NFR BLD: 0.2 % (ref 0–1.9)
BILIRUB DIRECT SERPL-MCNC: 0.6 MG/DL (ref 0.1–0.3)
BILIRUB SERPL-MCNC: 1.5 MG/DL (ref 0.1–1)
BUN SERPL-MCNC: 10 MG/DL (ref 6–20)
CALCIUM SERPL-MCNC: 7.7 MG/DL (ref 8.7–10.5)
CHLORIDE SERPL-SCNC: 100 MMOL/L (ref 95–110)
CO2 SERPL-SCNC: 25 MMOL/L (ref 23–29)
CREAT SERPL-MCNC: 1 MG/DL (ref 0.5–1.4)
DIFFERENTIAL METHOD: ABNORMAL
EOSINOPHIL # BLD AUTO: 0.1 K/UL (ref 0–0.5)
EOSINOPHIL NFR BLD: 0.2 % (ref 0–8)
ERYTHROCYTE [DISTWIDTH] IN BLOOD BY AUTOMATED COUNT: 15.5 % (ref 11.5–14.5)
EST. GFR  (NO RACE VARIABLE): >60 ML/MIN/1.73 M^2
GLUCOSE SERPL-MCNC: 160 MG/DL (ref 70–110)
GLUCOSE SERPL-MCNC: 163 MG/DL (ref 70–110)
GLUCOSE SERPL-MCNC: 163 MG/DL (ref 70–110)
HCT VFR BLD AUTO: 37.4 % (ref 37–48.5)
HGB BLD-MCNC: 11.5 G/DL (ref 12–16)
IMM GRANULOCYTES # BLD AUTO: 0.38 K/UL (ref 0–0.04)
IMM GRANULOCYTES NFR BLD AUTO: 1.6 % (ref 0–0.5)
LIPASE SERPL-CCNC: 267 U/L (ref 4–60)
LYMPHOCYTES # BLD AUTO: 0.9 K/UL (ref 1–4.8)
LYMPHOCYTES NFR BLD: 3.6 % (ref 18–48)
MAGNESIUM SERPL-MCNC: 1.3 MG/DL (ref 1.6–2.6)
MCH RBC QN AUTO: 28.2 PG (ref 27–31)
MCHC RBC AUTO-ENTMCNC: 30.7 G/DL (ref 32–36)
MCV RBC AUTO: 92 FL (ref 82–98)
MONOCYTES # BLD AUTO: 1.5 K/UL (ref 0.3–1)
MONOCYTES NFR BLD: 6.4 % (ref 4–15)
NEUTROPHILS # BLD AUTO: 21.3 K/UL (ref 1.8–7.7)
NEUTROPHILS NFR BLD: 88 % (ref 38–73)
NRBC BLD-RTO: 0 /100 WBC
PLATELET # BLD AUTO: 222 K/UL (ref 150–450)
PLATELET BLD QL SMEAR: ABNORMAL
PMV BLD AUTO: 10.9 FL (ref 9.2–12.9)
POTASSIUM SERPL-SCNC: 4.1 MMOL/L (ref 3.5–5.1)
PROT SERPL-MCNC: 6.7 G/DL (ref 6–8.4)
RBC # BLD AUTO: 4.08 M/UL (ref 4–5.4)
SODIUM SERPL-SCNC: 133 MMOL/L (ref 136–145)
WBC # BLD AUTO: 24.18 K/UL (ref 3.9–12.7)

## 2023-03-03 PROCEDURE — 37000009 HC ANESTHESIA EA ADD 15 MINS: Performed by: SURGERY

## 2023-03-03 PROCEDURE — 25000003 PHARM REV CODE 250: Performed by: SURGERY

## 2023-03-03 PROCEDURE — 27202125 HC BALLOON, EXTRACTION (ANY): Performed by: INTERNAL MEDICINE

## 2023-03-03 PROCEDURE — D9220A PRA ANESTHESIA: Mod: 59,ANES,, | Performed by: STUDENT IN AN ORGANIZED HEALTH CARE EDUCATION/TRAINING PROGRAM

## 2023-03-03 PROCEDURE — 99900031 HC PATIENT EDUCATION (STAT)

## 2023-03-03 PROCEDURE — 12000002 HC ACUTE/MED SURGE SEMI-PRIVATE ROOM

## 2023-03-03 PROCEDURE — 96376 TX/PRO/DX INJ SAME DRUG ADON: CPT

## 2023-03-03 PROCEDURE — 43274 ERCP DUCT STENT PLACEMENT: CPT | Performed by: INTERNAL MEDICINE

## 2023-03-03 PROCEDURE — 63600175 PHARM REV CODE 636 W HCPCS: Performed by: INTERNAL MEDICINE

## 2023-03-03 PROCEDURE — 37000008 HC ANESTHESIA 1ST 15 MINUTES: Performed by: INTERNAL MEDICINE

## 2023-03-03 PROCEDURE — 47562 PR LAP,CHOLECYSTECTOMY: ICD-10-PCS | Mod: ,,, | Performed by: SURGERY

## 2023-03-03 PROCEDURE — 80048 BASIC METABOLIC PNL TOTAL CA: CPT | Performed by: INTERNAL MEDICINE

## 2023-03-03 PROCEDURE — 27201423 OPTIME MED/SURG SUP & DEVICES STERILE SUPPLY: Performed by: SURGERY

## 2023-03-03 PROCEDURE — 63600175 PHARM REV CODE 636 W HCPCS: Performed by: SURGERY

## 2023-03-03 PROCEDURE — 25000003 PHARM REV CODE 250: Performed by: STUDENT IN AN ORGANIZED HEALTH CARE EDUCATION/TRAINING PROGRAM

## 2023-03-03 PROCEDURE — 36415 COLL VENOUS BLD VENIPUNCTURE: CPT | Performed by: INTERNAL MEDICINE

## 2023-03-03 PROCEDURE — D9220A PRA ANESTHESIA: ICD-10-PCS | Mod: 59,CRNA,, | Performed by: NURSE ANESTHETIST, CERTIFIED REGISTERED

## 2023-03-03 PROCEDURE — 25000003 PHARM REV CODE 250: Performed by: INTERNAL MEDICINE

## 2023-03-03 PROCEDURE — D9220A PRA ANESTHESIA: Mod: ANES,,, | Performed by: ANESTHESIOLOGY

## 2023-03-03 PROCEDURE — 83735 ASSAY OF MAGNESIUM: CPT | Performed by: INTERNAL MEDICINE

## 2023-03-03 PROCEDURE — 36000708 HC OR TIME LEV III 1ST 15 MIN: Performed by: SURGERY

## 2023-03-03 PROCEDURE — C9290 INJ, BUPIVACAINE LIPOSOME: HCPCS | Performed by: SURGERY

## 2023-03-03 PROCEDURE — 36000709 HC OR TIME LEV III EA ADD 15 MIN: Performed by: SURGERY

## 2023-03-03 PROCEDURE — D9220A PRA ANESTHESIA: Mod: CRNA,,, | Performed by: NURSE ANESTHETIST, CERTIFIED REGISTERED

## 2023-03-03 PROCEDURE — D9220A PRA ANESTHESIA: ICD-10-PCS | Mod: 59,ANES,, | Performed by: STUDENT IN AN ORGANIZED HEALTH CARE EDUCATION/TRAINING PROGRAM

## 2023-03-03 PROCEDURE — D9220A PRA ANESTHESIA: Mod: 59,CRNA,, | Performed by: NURSE ANESTHETIST, CERTIFIED REGISTERED

## 2023-03-03 PROCEDURE — 25000003 PHARM REV CODE 250: Performed by: NURSE ANESTHETIST, CERTIFIED REGISTERED

## 2023-03-03 PROCEDURE — 96366 THER/PROPH/DIAG IV INF ADDON: CPT

## 2023-03-03 PROCEDURE — D9220A PRA ANESTHESIA: ICD-10-PCS | Mod: ANES,,, | Performed by: ANESTHESIOLOGY

## 2023-03-03 PROCEDURE — 63600175 PHARM REV CODE 636 W HCPCS: Performed by: NURSE ANESTHETIST, CERTIFIED REGISTERED

## 2023-03-03 PROCEDURE — 63600175 PHARM REV CODE 636 W HCPCS: Mod: JZ,JG | Performed by: INTERNAL MEDICINE

## 2023-03-03 PROCEDURE — C1769 GUIDE WIRE: HCPCS | Performed by: INTERNAL MEDICINE

## 2023-03-03 PROCEDURE — C1877 STENT, NON-COAT/COV W/O DEL: HCPCS | Performed by: INTERNAL MEDICINE

## 2023-03-03 PROCEDURE — 43237 ENDOSCOPIC US EXAM ESOPH: CPT | Performed by: INTERNAL MEDICINE

## 2023-03-03 PROCEDURE — 71000033 HC RECOVERY, INTIAL HOUR: Performed by: SURGERY

## 2023-03-03 PROCEDURE — 47562 PR LAP,CHOLECYSTECTOMY: ICD-10-PCS | Mod: 80,,, | Performed by: SURGERY

## 2023-03-03 PROCEDURE — 94761 N-INVAS EAR/PLS OXIMETRY MLT: CPT

## 2023-03-03 PROCEDURE — 63600175 PHARM REV CODE 636 W HCPCS: Performed by: ANESTHESIOLOGY

## 2023-03-03 PROCEDURE — 94660 CPAP INITIATION&MGMT: CPT

## 2023-03-03 PROCEDURE — 85025 COMPLETE CBC W/AUTO DIFF WBC: CPT | Performed by: INTERNAL MEDICINE

## 2023-03-03 PROCEDURE — D9220A PRA ANESTHESIA: ICD-10-PCS | Mod: CRNA,,, | Performed by: NURSE ANESTHETIST, CERTIFIED REGISTERED

## 2023-03-03 PROCEDURE — 99900035 HC TECH TIME PER 15 MIN (STAT)

## 2023-03-03 PROCEDURE — 37000008 HC ANESTHESIA 1ST 15 MINUTES: Performed by: SURGERY

## 2023-03-03 PROCEDURE — 94799 UNLISTED PULMONARY SVC/PX: CPT

## 2023-03-03 PROCEDURE — 80076 HEPATIC FUNCTION PANEL: CPT | Performed by: INTERNAL MEDICINE

## 2023-03-03 PROCEDURE — 83690 ASSAY OF LIPASE: CPT | Performed by: INTERNAL MEDICINE

## 2023-03-03 PROCEDURE — 25000003 PHARM REV CODE 250: Mod: TB,JG | Performed by: INTERNAL MEDICINE

## 2023-03-03 PROCEDURE — 47562 LAPAROSCOPIC CHOLECYSTECTOMY: CPT | Mod: 80,,, | Performed by: SURGERY

## 2023-03-03 PROCEDURE — 37000009 HC ANESTHESIA EA ADD 15 MINS: Performed by: INTERNAL MEDICINE

## 2023-03-03 PROCEDURE — 27000080 OPTIME MED/SURG SUP & DEVICES GENERAL CLASSIFICATION: Performed by: SURGERY

## 2023-03-03 PROCEDURE — 47562 LAPAROSCOPIC CHOLECYSTECTOMY: CPT | Mod: ,,, | Performed by: SURGERY

## 2023-03-03 DEVICE — IMPLANTABLE DEVICE: Type: IMPLANTABLE DEVICE | Site: OTHER (ADD COMMENT) | Status: FUNCTIONAL

## 2023-03-03 RX ORDER — DIPHENHYDRAMINE HYDROCHLORIDE 50 MG/ML
12.5 INJECTION INTRAMUSCULAR; INTRAVENOUS
Status: DISCONTINUED | OUTPATIENT
Start: 2023-03-03 | End: 2023-03-03 | Stop reason: HOSPADM

## 2023-03-03 RX ORDER — HYDROMORPHONE HYDROCHLORIDE 1 MG/ML
1 INJECTION, SOLUTION INTRAMUSCULAR; INTRAVENOUS; SUBCUTANEOUS EVERY 6 HOURS PRN
Status: DISCONTINUED | OUTPATIENT
Start: 2023-03-03 | End: 2023-03-06

## 2023-03-03 RX ORDER — HYDROCODONE BITARTRATE AND ACETAMINOPHEN 5; 325 MG/1; MG/1
1 TABLET ORAL EVERY 6 HOURS PRN
Status: DISCONTINUED | OUTPATIENT
Start: 2023-03-03 | End: 2023-03-04

## 2023-03-03 RX ORDER — HYDROMORPHONE HYDROCHLORIDE 1 MG/ML
0.2 INJECTION, SOLUTION INTRAMUSCULAR; INTRAVENOUS; SUBCUTANEOUS EVERY 5 MIN PRN
Status: DISCONTINUED | OUTPATIENT
Start: 2023-03-03 | End: 2023-03-03 | Stop reason: HOSPADM

## 2023-03-03 RX ORDER — BUPIVACAINE HYDROCHLORIDE AND EPINEPHRINE 5; 5 MG/ML; UG/ML
INJECTION, SOLUTION EPIDURAL; INTRACAUDAL; PERINEURAL
Status: DISCONTINUED | OUTPATIENT
Start: 2023-03-03 | End: 2023-03-03 | Stop reason: HOSPADM

## 2023-03-03 RX ORDER — ONDANSETRON 2 MG/ML
4 INJECTION INTRAMUSCULAR; INTRAVENOUS DAILY PRN
Status: DISCONTINUED | OUTPATIENT
Start: 2023-03-03 | End: 2023-03-03 | Stop reason: HOSPADM

## 2023-03-03 RX ORDER — INDOMETHACIN 50 MG/1
SUPPOSITORY RECTAL
Status: COMPLETED | OUTPATIENT
Start: 2023-03-03 | End: 2023-03-03

## 2023-03-03 RX ORDER — SUCCINYLCHOLINE CHLORIDE 20 MG/ML
INJECTION INTRAMUSCULAR; INTRAVENOUS
Status: DISCONTINUED | OUTPATIENT
Start: 2023-03-03 | End: 2023-03-03

## 2023-03-03 RX ORDER — PROPOFOL 10 MG/ML
VIAL (ML) INTRAVENOUS
Status: DISCONTINUED | OUTPATIENT
Start: 2023-03-03 | End: 2023-03-03

## 2023-03-03 RX ORDER — HYDROMORPHONE HYDROCHLORIDE 1 MG/ML
1 INJECTION, SOLUTION INTRAMUSCULAR; INTRAVENOUS; SUBCUTANEOUS ONCE
Status: COMPLETED | OUTPATIENT
Start: 2023-03-03 | End: 2023-03-03

## 2023-03-03 RX ORDER — MIDAZOLAM HYDROCHLORIDE 5 MG/ML
INJECTION INTRAMUSCULAR; INTRAVENOUS
Status: DISCONTINUED | OUTPATIENT
Start: 2023-03-03 | End: 2023-03-03

## 2023-03-03 RX ORDER — PANTOPRAZOLE SODIUM 40 MG/10ML
40 INJECTION, POWDER, LYOPHILIZED, FOR SOLUTION INTRAVENOUS
Status: DISCONTINUED | OUTPATIENT
Start: 2023-03-04 | End: 2023-03-08

## 2023-03-03 RX ORDER — FENTANYL CITRATE 50 UG/ML
INJECTION, SOLUTION INTRAMUSCULAR; INTRAVENOUS
Status: DISCONTINUED | OUTPATIENT
Start: 2023-03-03 | End: 2023-03-03

## 2023-03-03 RX ORDER — ROCURONIUM BROMIDE 10 MG/ML
INJECTION, SOLUTION INTRAVENOUS
Status: DISCONTINUED | OUTPATIENT
Start: 2023-03-03 | End: 2023-03-03

## 2023-03-03 RX ORDER — LIDOCAINE HCL/PF 100 MG/5ML
SYRINGE (ML) INTRAVENOUS
Status: DISCONTINUED | OUTPATIENT
Start: 2023-03-03 | End: 2023-03-03

## 2023-03-03 RX ORDER — CALCIUM GLUCONATE 20 MG/ML
1 INJECTION, SOLUTION INTRAVENOUS ONCE
Status: COMPLETED | OUTPATIENT
Start: 2023-03-03 | End: 2023-03-03

## 2023-03-03 RX ORDER — OXYCODONE HYDROCHLORIDE 5 MG/1
5 TABLET ORAL
Status: DISCONTINUED | OUTPATIENT
Start: 2023-03-03 | End: 2023-03-03 | Stop reason: HOSPADM

## 2023-03-03 RX ORDER — FAMOTIDINE 10 MG/ML
INJECTION INTRAVENOUS
Status: DISCONTINUED | OUTPATIENT
Start: 2023-03-03 | End: 2023-03-03

## 2023-03-03 RX ORDER — MAGNESIUM SULFATE HEPTAHYDRATE 40 MG/ML
2 INJECTION, SOLUTION INTRAVENOUS ONCE
Status: COMPLETED | OUTPATIENT
Start: 2023-03-03 | End: 2023-03-03

## 2023-03-03 RX ORDER — FENTANYL CITRATE 50 UG/ML
INJECTION, SOLUTION INTRAMUSCULAR; INTRAVENOUS
Status: DISCONTINUED | OUTPATIENT
Start: 2023-03-03 | End: 2023-03-03 | Stop reason: HOSPADM

## 2023-03-03 RX ORDER — ONDANSETRON 2 MG/ML
INJECTION INTRAMUSCULAR; INTRAVENOUS
Status: DISCONTINUED | OUTPATIENT
Start: 2023-03-03 | End: 2023-03-03

## 2023-03-03 RX ADMIN — HYDROMORPHONE HYDROCHLORIDE 1 MG: 1 INJECTION, SOLUTION INTRAMUSCULAR; INTRAVENOUS; SUBCUTANEOUS at 03:03

## 2023-03-03 RX ADMIN — MAGNESIUM SULFATE HEPTAHYDRATE 2 G: 40 INJECTION, SOLUTION INTRAVENOUS at 08:03

## 2023-03-03 RX ADMIN — ESCITALOPRAM OXALATE 20 MG: 10 TABLET ORAL at 09:03

## 2023-03-03 RX ADMIN — SODIUM CHLORIDE, SODIUM LACTATE, POTASSIUM CHLORIDE, AND CALCIUM CHLORIDE: .6; .31; .03; .02 INJECTION, SOLUTION INTRAVENOUS at 11:03

## 2023-03-03 RX ADMIN — ONDANSETRON 4 MG: 2 INJECTION INTRAMUSCULAR; INTRAVENOUS at 05:03

## 2023-03-03 RX ADMIN — PIPERACILLIN SODIUM AND TAZOBACTAM SODIUM 3.38 G: 2; .25 INJECTION, POWDER, LYOPHILIZED, FOR SOLUTION INTRAVENOUS at 06:03

## 2023-03-03 RX ADMIN — PIPERACILLIN AND TAZOBACTAM 4.5 G: 4; .5 INJECTION, POWDER, LYOPHILIZED, FOR SOLUTION INTRAVENOUS; PARENTERAL at 06:03

## 2023-03-03 RX ADMIN — PROPOFOL 100 MG: 10 INJECTION, EMULSION INTRAVENOUS at 11:03

## 2023-03-03 RX ADMIN — LIDOCAINE HYDROCHLORIDE 50 MG: 20 INJECTION, SOLUTION INTRAVENOUS at 04:03

## 2023-03-03 RX ADMIN — PANTOPRAZOLE SODIUM 40 MG: 40 TABLET, DELAYED RELEASE ORAL at 06:03

## 2023-03-03 RX ADMIN — CALCIUM GLUCONATE 1 G: 20 INJECTION, SOLUTION INTRAVENOUS at 08:03

## 2023-03-03 RX ADMIN — SUGAMMADEX 400 MG: 100 INJECTION, SOLUTION INTRAVENOUS at 07:03

## 2023-03-03 RX ADMIN — PROPOFOL 150 MG: 10 INJECTION, EMULSION INTRAVENOUS at 11:03

## 2023-03-03 RX ADMIN — ROCURONIUM BROMIDE 30 MG: 10 INJECTION, SOLUTION INTRAVENOUS at 06:03

## 2023-03-03 RX ADMIN — LEVOTHYROXINE SODIUM 100 MCG: 0.1 TABLET ORAL at 06:03

## 2023-03-03 RX ADMIN — FENTANYL CITRATE 50 MCG: 50 INJECTION INTRAMUSCULAR; INTRAVENOUS at 04:03

## 2023-03-03 RX ADMIN — HYDROMORPHONE HYDROCHLORIDE 1 MG: 1 INJECTION, SOLUTION INTRAMUSCULAR; INTRAVENOUS; SUBCUTANEOUS at 08:03

## 2023-03-03 RX ADMIN — MIDAZOLAM HYDROCHLORIDE 1 MG: 5 INJECTION, SOLUTION INTRAMUSCULAR; INTRAVENOUS at 04:03

## 2023-03-03 RX ADMIN — HYDROMORPHONE HYDROCHLORIDE 1 MG: 1 INJECTION, SOLUTION INTRAMUSCULAR; INTRAVENOUS; SUBCUTANEOUS at 02:03

## 2023-03-03 RX ADMIN — PROPOFOL 180 MG: 10 INJECTION, EMULSION INTRAVENOUS at 04:03

## 2023-03-03 RX ADMIN — FAMOTIDINE 20 MG: 10 INJECTION, SOLUTION INTRAVENOUS at 07:03

## 2023-03-03 RX ADMIN — PIPERACILLIN AND TAZOBACTAM 4.5 G: 4; .5 INJECTION, POWDER, LYOPHILIZED, FOR SOLUTION INTRAVENOUS; PARENTERAL at 09:03

## 2023-03-03 RX ADMIN — OXYCODONE HYDROCHLORIDE 5 MG: 5 TABLET ORAL at 08:03

## 2023-03-03 RX ADMIN — SODIUM CHLORIDE, SODIUM LACTATE, POTASSIUM CHLORIDE, AND CALCIUM CHLORIDE: .6; .31; .03; .02 INJECTION, SOLUTION INTRAVENOUS at 04:03

## 2023-03-03 RX ADMIN — Medication 160 MG: at 04:03

## 2023-03-03 RX ADMIN — LISINOPRIL 10 MG: 2.5 TABLET ORAL at 09:03

## 2023-03-03 RX ADMIN — ROCURONIUM BROMIDE 10 MG: 10 INJECTION, SOLUTION INTRAVENOUS at 07:03

## 2023-03-03 RX ADMIN — GLUCAGON 0.5 MG: KIT at 05:03

## 2023-03-03 NOTE — SUBJECTIVE & OBJECTIVE
Interval History: Patient is complaining of RUQ abdominal pain with nausea, scheduled to undergo EUS/ERCP. No CP or SOB.    Review of Systems   Constitutional: Negative.    HENT: Negative.     Eyes: Negative.    Respiratory: Negative.     Cardiovascular: Negative.    Gastrointestinal:  Positive for abdominal pain.   Endocrine: Negative.    Genitourinary: Negative.    Musculoskeletal: Negative.    Allergic/Immunologic: Negative.    Neurological: Negative.    Hematological: Negative.    Objective:     Vital Signs (Most Recent):  Temp: 98.7 °F (37.1 °C) (03/03/23 0717)  Pulse: 74 (03/03/23 0717)  Resp: 18 (03/03/23 0717)  BP: (!) 145/87 (03/03/23 0717)  SpO2: (!) 94 % (03/03/23 0717)   Vital Signs (24h Range):  Temp:  [98.6 °F (37 °C)-100.9 °F (38.3 °C)] 98.7 °F (37.1 °C)  Pulse:  [65-81] 74  Resp:  [16-18] 18  SpO2:  [91 %-95 %] 94 %  BP: (125-171)/(61-92) 145/87     Weight: (!) 194.1 kg (428 lb)  Body mass index is 67.03 kg/m².  No intake or output data in the 24 hours ending 03/03/23 0745   Physical Exam  Constitutional:       Appearance: She is well-developed.   HENT:      Head: Normocephalic and atraumatic.   Eyes:      Conjunctiva/sclera: Conjunctivae normal.      Pupils: Pupils are equal, round, and reactive to light.   Neck:      Thyroid: No thyromegaly.      Vascular: No JVD.   Cardiovascular:      Rate and Rhythm: Normal rate and regular rhythm.      Heart sounds: No murmur heard.    No friction rub. No gallop.   Pulmonary:      Effort: Pulmonary effort is normal.      Breath sounds: Normal breath sounds.   Abdominal:      General: Bowel sounds are normal. There is no distension.      Palpations: Abdomen is soft. There is no mass.      Tenderness: There is no abdominal tenderness.      Comments: RUQ abdominal tenderness without guarding or rigidity   Musculoskeletal:         General: Normal range of motion.      Cervical back: Neck supple.   Skin:     General: Skin is warm and dry.   Neurological:      Mental  Status: She is oriented to person, place, and time.      Cranial Nerves: No cranial nerve deficit.   Psychiatric:         Behavior: Behavior normal.       Significant Labs: All pertinent labs within the past 24 hours have been reviewed.  CBC:   Recent Labs   Lab 03/01/23 1942 03/02/23 0927 03/03/23 0641   WBC 8.95 11.82 24.18*   HGB 11.8* 11.9* 11.5*   HCT 37.8 38.1 37.4    231 222     CMP:   Recent Labs   Lab 03/01/23 1942 03/02/23 0927 03/03/23 0641   * 138 133*   K 4.9 4.4 4.1   CL 99 102 100   CO2 24 26 25   * 153* 163*   BUN 11 9 10   CREATININE 0.8 0.7 1.0   CALCIUM 8.6* 8.7 7.7*   PROT 7.3 7.3  --    ALBUMIN 3.5 3.4*  --    BILITOT 2.6* 3.3*  --    ALKPHOS 232* 246*  --    * 323*  --    * 348*  --    ANIONGAP 11 10 8     Coagulation: No results for input(s): PT, INR, APTT in the last 48 hours.  Lactic Acid: No results for input(s): LACTATE in the last 48 hours.  Lipase:   Recent Labs   Lab 03/01/23 1942   LIPASE 3597*     Microbiology Results (last 7 days)       ** No results found for the last 168 hours. **          Significant Imaging:   US abdomen:  1.  Cholelithiasis with positive sonographic Chaudhary's. No gallbladder wall thickening or pericholecystic fluid.  2.  Hepatomegaly with fatty infiltration of the liver.    CTA chest:  1. No convincing findings of acute pulmonary thromboembolism, although secondary and tertiary branch evaluation limited by timing of the contrast bolus and quantum noise morphology.  2. No acute intrathoracic process..    CXR: Negative chest.    EUS:  - Normal esophagus.                          - Normal stomach.                          - Normal examined duodenum.                          - Limited mediastinal exam unremarkable                          - There was no sign of significant pathology in                          the common bile duct.                          - There was no sign of significant pathology in                           the ampulla.                          - Pancreatic parenchymal abnormalities consisting                          of hyperechoic strands were noted in the entire                          pancreas.                          - A few benign lymph nodes were visualized in the                          christie hepatis region.                          - No specimens collected.

## 2023-03-03 NOTE — NURSING
0700: report received, in bed moaning, not due for pain med  0840: medicated once for pain  1040: off floor to endo for ultrasound.  1750: patient went for ultrasound in endo and ended up staying for gallbladder surgery, as of 1730 patient is in the OR.    Received request via: Pharmacy    Was the patient seen in the last year in this department? Yes    Does the patient have an active prescription (recently filled or refills available) for medication(s) requested? No

## 2023-03-03 NOTE — ANESTHESIA POSTPROCEDURE EVALUATION
Anesthesia Post Evaluation    Patient: Lavinia Herrera    Procedure(s) Performed: Procedure(s) (LRB):  ULTRASOUND, UPPER GI TRACT, ENDOSCOPIC (N/A)    Final Anesthesia Type: MAC      Patient location during evaluation: GI PACU  Patient participation: Yes- Able to Participate  Level of consciousness: awake and alert and oriented  Post-procedure vital signs: reviewed and stable  Pain management: adequate  Airway patency: patent    PONV status at discharge: No PONV  Anesthetic complications: no      Cardiovascular status: blood pressure returned to baseline and hemodynamically stable  Respiratory status: unassisted, spontaneous ventilation and room air  Hydration status: euvolemic  Follow-up not needed.          Vitals Value Taken Time   /60 03/03/23 1235   Temp 38.2 03/03/23 1227   Pulse 81 03/03/23 1240   Resp 18 03/03/23 1242   SpO2 93 % 03/03/23 1240   Vitals shown include unvalidated device data.      No case tracking events are documented in the log.      Pain/Tariq Score: Pain Rating Prior to Med Admin: 10 (3/3/2023  8:37 AM)  Pain Rating Post Med Admin: 2 (3/2/2023 12:00 PM)

## 2023-03-03 NOTE — ASSESSMENT & PLAN NOTE
Body mass index is 67.03 kg/m². Morbid obesity complicates all aspects of disease management from diagnostic modalities to treatment. Weight loss encouraged and health benefits explained to patient.

## 2023-03-03 NOTE — ASSESSMENT & PLAN NOTE
Biliary Pancreatitis with possible acute cholecystitis and cholelithiasis.  EUS results reviewed. Follow ERCP results. Then would need Lap cholecystectomy.  Trend LFTs and transaminases.  Continue IV Fluids and IV antibiotics with Zosyn.   Follow GI and surgery recommendations.

## 2023-03-03 NOTE — [employer] PATIENT INSTRUCTIONS
Discharge Summary/Instructions after an Endoscopic Procedure  Patient Name: Lavinia Herrera  Patient MRN: [id number]  Patient YOB: 1975  Friday, March 3, 2023  Dc Riddle III, MD  RESTRICTIONS:  During your procedure today, you received medications for sedation.  These   medications may affect your judgment, balance and coordination.  Therefore,   for 24 hours, you have the following restrictions:   - DO NOT drive a car, operate machinery, make legal/financial decisions,   sign important papers or drink alcohol.    ACTIVITY:  Today: no heavy lifting, straining or running due to procedural   sedation/anesthesia.  The following day: return to full activity including work.  DIET:  Eat and drink normally unless instructed otherwise.     TREATMENT FOR COMMON SIDE EFFECTS:  - Mild abdominal pain, nausea, belching, bloating or excessive gas:  rest,   eat lightly and use a heating pad.  - Sore Throat: treat with throat lozenges and/or gargle with warm salt   water.  - Because air was used during the procedure, expelling large amounts of air   from your rectum or belching is normal.  - If a bowel prep was taken, you may not have a bowel movement for 1-3 days.    This is normal.  SYMPTOMS TO WATCH FOR AND REPORT TO YOUR PHYSICIAN:  1. Abdominal pain or bloating, other than gas cramps.  2. Chest pain.  3. Back pain.  4. Signs of infection such as: chills or fever occurring within 24 hours   after the procedure.  5. Rectal bleeding, which would show as bright red, maroon, or black stools.   (A tablespoon of blood from the rectum is not serious, especially if   hemorrhoids are present.)  6. Vomiting.  7. Weakness or dizziness.  GO DIRECTLY TO THE NEAREST EMERGENCY ROOM IF YOU HAVE ANY OF THE FOLLOWING:      Difficulty breathing              Chills and/or fever over 101 F   Persistent vomiting and/or vomiting blood   Severe abdominal pain   Severe chest pain   Black, tarry stools   Bleeding- more than one  tablespoon   Any other symptom or condition that you feel may need urgent attention  Your doctor recommends these additional instructions:  If any biopsies were taken, your doctors clinic will contact you in 1 to 2   weeks with any results.  - Patient has a contact number available for emergencies.  The signs and   symptoms of potential delayed complications were discussed with the   patient.  Return to normal activities tomorrow.  Written discharge   instructions were provided to the patient.   - Continue present medications.   - ERCP / lap yesica today.   - Transport patient to OR.  For questions, problems or results please call your physician - Dc Riddle III, MD at Work:  [phone].  [facility], EMERGENCY ROOM PHONE NUMBER: [phone]  IF A COMPLICATION OR EMERGENCY SITUATION ARISES AND YOU ARE UNABLE TO REACH   YOUR PHYSICIAN - GO DIRECTLY TO THE EMERGENCY ROOM.  Dc Riddle III, MD  3/3/2023 12:03:25 PM  This report has been verified and signed electronically.  Dear patient,  As a result of recent federal legislation (The Federal Cures Act), you may   receive lab or pathology results from your procedure in your [facility]   account before your physician is able to contact you. Your physician or   their representative will relay the results to you with their   recommendations at their soonest availability.  Thank you,  [employer]

## 2023-03-03 NOTE — ANESTHESIA PROCEDURE NOTES
Intubation    Date/Time: 3/3/2023 5:00 PM  Performed by: Jannie Agarwal CRNA  Authorized by: Best Pandya MD     Intubation:     Induction:  Intravenous    Intubated:  Postinduction    Mask Ventilation:  Not attempted    Attempts:  1    Attempted By:  CRNA    Method of Intubation:  Direct    Blade:  Wright 3    Laryngeal View Grade: Grade I - full view of cords      Difficult Airway Encountered?: Yes      Complications:  None    Airway Device:  Oral endotracheal tube    Airway Device Size:  7.0    Style/Cuff Inflation:  Cuffed    Secured at:  The lips    Placement Verified By:  Capnometry    Complicating Factors:  None and obesity    Findings Post-Intubation:  BS equal bilateral and atraumatic/condition of teeth unchanged

## 2023-03-03 NOTE — ASSESSMENT & PLAN NOTE
Patient's FSGs are uncontrolled due to hyperglycemia on current medication regimen.  Last A1c reviewed-   Lab Results   Component Value Date    HGBA1C 7.1 (H) 11/02/2022     Most recent fingerstick glucose reviewed- No results for input(s): POCTGLUCOSE in the last 24 hours.  Current correctional scale  Medium  Increase anti-hyperglycemic dose as follows-   Antihyperglycemics (From admission, onward)    Start     Stop Route Frequency Ordered    03/02/23 0410  insulin aspart U-100 pen 1-10 Units         -- SubQ Before meals & nightly PRN 03/02/23 0410        Hold Oral hypoglycemics while patient is in the hospital.

## 2023-03-03 NOTE — PROGRESS NOTES
[facility]  Progress Note    Patient Name: Lavinia Herrera  MRN: [id number]  Patient Class: IP- Inpatient   Admission Date: 3/1/2023  Length of Stay: 0 days  Attending Physician: Sunni Mathis MD  Primary Care Provider: Oniel Malik MD        Subjective:     Principal Problem:Cholelithiasis with acute cholecystitis        HPI:  Patient is a 48-year-old female, who presents emergency room complaining of abdominal pain.  Patient states that she is had abdominal pain for 2 days worsening without relieving factors.  She denies any nausea or vomiting.  No fevers chills.  Her past medical significant for hypothyroidism, diabetes mellitus and elevated blood pressure.  Patient denies any chest pains.  No shortness breath.    Plan to place patient in observation.  Consult general surgery.  Obtain an MRCP.  Continue IV antibiotics.  Plan per surgery    Elevated transaminase and elevated bilirubin most likely caused from cholelithiasis.  Elevated blood pressure probably related to pain discomfort will add ACE inhibitor for renal protection for diabetes and blood pressure control      Overview/Hospital Course:  No notes on file    Interval History: Patient is complaining of RUQ abdominal pain with nausea, scheduled to undergo EUS/ERCP. No CP or SOB.    Review of Systems   Constitutional: Negative.    HENT: Negative.     Eyes: Negative.    Respiratory: Negative.     Cardiovascular: Negative.    Gastrointestinal:  Positive for abdominal pain.   Endocrine: Negative.    Genitourinary: Negative.    Musculoskeletal: Negative.    Allergic/Immunologic: Negative.    Neurological: Negative.    Hematological: Negative.    Objective:     Vital Signs (Most Recent):  Temp: 98.7 °F (37.1 °C) (03/03/23 0717)  Pulse: 74 (03/03/23 0717)  Resp: 18 (03/03/23 0717)  BP: (!) 145/87 (03/03/23 0717)  SpO2: (!) 94 % (03/03/23 0717)   Vital Signs (24h Range):  Temp:  [98.6 °F (37 °C)-100.9 °F (38.3 °C)] 98.7 °F (37.1  °C)  Pulse:  [65-81] 74  Resp:  [16-18] 18  SpO2:  [91 %-95 %] 94 %  BP: (125-171)/(61-92) 145/87     Weight: (!) 194.1 kg (428 lb)  Body mass index is 67.03 kg/m².  No intake or output data in the 24 hours ending 03/03/23 0745   Physical Exam  Constitutional:       Appearance: She is well-developed.   HENT:      Head: Normocephalic and atraumatic.   Eyes:      Conjunctiva/sclera: Conjunctivae normal.      Pupils: Pupils are equal, round, and reactive to light.   Neck:      Thyroid: No thyromegaly.      Vascular: No JVD.   Cardiovascular:      Rate and Rhythm: Normal rate and regular rhythm.      Heart sounds: No murmur heard.    No friction rub. No gallop.   Pulmonary:      Effort: Pulmonary effort is normal.      Breath sounds: Normal breath sounds.   Abdominal:      General: Bowel sounds are normal. There is no distension.      Palpations: Abdomen is soft. There is no mass.      Tenderness: There is no abdominal tenderness.      Comments: RUQ abdominal tenderness without guarding or rigidity   Musculoskeletal:         General: Normal range of motion.      Cervical back: Neck supple.   Skin:     General: Skin is warm and dry.   Neurological:      Mental Status: She is oriented to person, place, and time.      Cranial Nerves: No cranial nerve deficit.   Psychiatric:         Behavior: Behavior normal.       Significant Labs: All pertinent labs within the past 24 hours have been reviewed.  CBC:   Recent Labs   Lab 03/01/23 1942 03/02/23 0927 03/03/23  0641   WBC 8.95 11.82 24.18*   HGB 11.8* 11.9* 11.5*   HCT 37.8 38.1 37.4    231 222     CMP:   Recent Labs   Lab 03/01/23 1942 03/02/23  0927 03/03/23  0641   * 138 133*   K 4.9 4.4 4.1   CL 99 102 100   CO2 24 26 25   * 153* 163*   BUN 11 9 10   CREATININE 0.8 0.7 1.0   CALCIUM 8.6* 8.7 7.7*   PROT 7.3 7.3  --    ALBUMIN 3.5 3.4*  --    BILITOT 2.6* 3.3*  --    ALKPHOS 232* 246*  --    * 323*  --    * 348*  --    ANIONGAP 11 10 8      Coagulation: No results for input(s): PT, INR, APTT in the last 48 hours.  Lactic Acid: No results for input(s): LACTATE in the last 48 hours.  Lipase:   Recent Labs   Lab 03/01/23 1942   LIPASE 3597*     Microbiology Results (last 7 days)       ** No results found for the last 168 hours. **          Significant Imaging:   US abdomen:  1.  Cholelithiasis with positive sonographic Chaudhary's. No gallbladder wall thickening or pericholecystic fluid.  2.  Hepatomegaly with fatty infiltration of the liver.    CTA chest:  1. No convincing findings of acute pulmonary thromboembolism, although secondary and tertiary branch evaluation limited by timing of the contrast bolus and quantum noise morphology.  2. No acute intrathoracic process..    CXR: Negative chest.    EUS:  - Normal esophagus.                          - Normal stomach.                          - Normal examined duodenum.                          - Limited mediastinal exam unremarkable                          - There was no sign of significant pathology in                          the common bile duct.                          - There was no sign of significant pathology in                          the ampulla.                          - Pancreatic parenchymal abnormalities consisting                          of hyperechoic strands were noted in the entire                          pancreas.                          - A few benign lymph nodes were visualized in the                          christie hepatis region.                          - No specimens collected.       Assessment/Plan:      * Cholelithiasis with acute cholecystitis  Biliary Pancreatitis with possible acute cholecystitis and cholelithiasis.  EUS results reviewed. Follow ERCP results. Then would need Lap cholecystectomy.  Trend LFTs and transaminases.  Continue IV Fluids and IV antibiotics with Zosyn.   Follow GI and surgery recommendations.       Diabetes mellitus  Patient's FSGs are  uncontrolled due to hyperglycemia on current medication regimen.  Last A1c reviewed-   Lab Results   Component Value Date    HGBA1C 7.1 (H) 11/02/2022     Most recent fingerstick glucose reviewed- No results for input(s): POCTGLUCOSE in the last 24 hours.  Current correctional scale  Medium  Increase anti-hyperglycemic dose as follows-   Antihyperglycemics (From admission, onward)    Start     Stop Route Frequency Ordered    03/02/23 0410  insulin aspart U-100 pen 1-10 Units         -- SubQ Before meals & nightly PRN 03/02/23 0410        Hold Oral hypoglycemics while patient is in the hospital.    Hypothyroidism  Chronic problem. Will continue chronic medications and monitor for any changes, adjusting as needed.            Morbid obesity with BMI of 60.0-69.9, adult  Body mass index is 67.03 kg/m². Morbid obesity complicates all aspects of disease management from diagnostic modalities to treatment. Weight loss encouraged and health benefits explained to patient.           VTE Risk Mitigation (From admission, onward)         Ordered     IP VTE HIGH RISK PATIENT  Once. Start Lovenox post-operatively.         03/02/23 0410     Place sequential compression device  Until discontinued         03/02/23 0410                Discharge Planning   JOSE ANTONIO: 3/4/2023     Code Status: Full Code   Is the patient medically ready for discharge?:     Reason for patient still in hospital (select all that apply): Patient trending condition and Consult recommendations  Discharge Plan A: Home                  Sunni Mathis MD  Department of Hospital Medicine   [facility]

## 2023-03-03 NOTE — ANESTHESIA PREPROCEDURE EVALUATION
03/03/2023  Lavinia Herrera is a 48 y.o., female.      Patient Active Problem List   Diagnosis    Snoring    Elevated blood pressure    Seasonal allergies    Impaired fasting blood sugar    GERD (gastroesophageal reflux disease)    Morbid obesity with BMI of 60.0-69.9, adult    Hypothyroidism    Grief    Diabetes mellitus    Cholelithiasis with acute cholecystitis       Past Surgical History:   Procedure Laterality Date    MANDIBLE OSTEOTOMY      TYMPANOSTOMY TUBE PLACEMENT  6/30/14        Tobacco Use:  The patient  reports that she quit smoking about 24 years ago. She has never used smokeless tobacco.     Results for orders placed or performed during the hospital encounter of 03/01/23   EKG 12-lead    Collection Time: 03/01/23  5:55 PM    Narrative    Test Reason : R07.9,    Vent. Rate : 056 BPM     Atrial Rate : 056 BPM     P-R Int : 190 ms          QRS Dur : 086 ms      QT Int : 466 ms       P-R-T Axes : 061 007 045 degrees     QTc Int : 449 ms    Sinus bradycardia with sinus arrhythmia  Possible Left atrial enlargement  Borderline Abnormal ECG  When compared with ECG of 15-MAY-2014 11:42,  No significant change was found  Confirmed by Lazaro Russ MD ([id number]) on 3/2/2023 4:20:19 PM    Referred By: ADELSO   SELF           Confirmed By:Lazaro Russ MD             Lab Results   Component Value Date    WBC 24.18 (H) 03/03/2023    HGB 11.5 (L) 03/03/2023    HCT 37.4 03/03/2023    MCV 92 03/03/2023     03/03/2023     BMP  Lab Results   Component Value Date     (L) 03/03/2023    K 4.1 03/03/2023     03/03/2023    CO2 25 03/03/2023    BUN 10 03/03/2023    CREATININE 1.0 03/03/2023    CALCIUM 7.7 (L) 03/03/2023    ANIONGAP 8 03/03/2023     (H) 03/03/2023     (H) 03/02/2023     (H) 03/01/2023       No results found for this or any previous  visit.            Pre-op Assessment    I have reviewed the Patient Summary Reports.     I have reviewed the Nursing Notes. I have reviewed the NPO Status.   I have reviewed the Medications.     Review of Systems  Anesthesia Hx:  Denies Family Hx of Anesthesia complications.   Denies Personal Hx of Anesthesia complications.   Social:  Former Smoker    Cardiovascular:   Hypertension    Pulmonary:   Sleep Apnea suspected   Hepatic/GI:   GERD    Endocrine:   Diabetes Hypothyroidism  Morbid Obesity / BMI > 40      Physical Exam  General: Well nourished, Cooperative, Alert and Oriented    Airway:  Mallampati: II   Mouth Opening: Normal  TM Distance: Normal  Tongue: Normal  Neck ROM: Normal ROM    Dental:Multiple missing teeth  Chest/Lungs:  Clear to auscultation    Heart:  Rate: Normal  Rhythm: Regular Rhythm  Sounds: Normal        Anesthesia Plan  Type of Anesthesia, risks & benefits discussed:    Anesthesia Type: MAC  Intra-op Monitoring Plan: Standard ASA Monitors  Induction:  IV  Informed Consent: Informed consent signed with the Patient and all parties understand the risks and agree with anesthesia plan.  All questions answered.   ASA Score: 3  Anesthesia Plan Notes: MAC  LUCAS precautions   Calcium and Magnesium were replaced this am      Ready For Surgery From Anesthesia Perspective.     .

## 2023-03-03 NOTE — CONSULTS
GASTROENTEROLOGY INPATIENT CONSULT NOTE  Patient Name: Lavinia Herrera  Patient MRN: [id number]  Patient : 1975    Admit Date: 3/1/2023  Service date: 3/2/2023    Reason for Consult: abdominal pain    PCP: Oniel Malik MD    Chief Complaint   Patient presents with    Abdominal Pain     Diffuse abd pain x days that radiates into her armpits and chest. BM today. Denies dysuria.        HPI: Patient is a 48 y.o. female with PMHx  super morbid obesity admitted to the hospital with acute onset moderate to severe persistent diffuse abdominal pain worse in the right upper quadrant associated with lipase of 1997.  Ultrasound showed gallstones with positive Chaudhary sign and bile duct of 4-5 mm.  MRCP was ordered but patient is too obese to have the procedure.  Bilirubin has increased over the past 24 hours.  BUN creatinine normal.    CT chest  IMPRESSION:  1. No convincing findings of acute pulmonary thromboembolism, although secondary and tertiary branch evaluation limited by timing of the contrast bolus and quantum noise morphology.  2. No acute intrathoracic process..      US Abd  IMPRESSION:  1.  Cholelithiasis with positive sonographic Chaudhary's. No gallbladder wall thickening or pericholecystic fluid.  2.  Hepatomegaly with fatty infiltration of the liver.    Past Medical History:  Past Medical History:   Diagnosis Date    Allergy     Diabetes mellitus, type 2     GERD (gastroesophageal reflux disease)     Hypothyroidism     Morbid obesity     Otitis media     Seasonal allergies         Past Surgical History:  Past Surgical History:   Procedure Laterality Date    MANDIBLE OSTEOTOMY      TYMPANOSTOMY TUBE PLACEMENT  13        Home Medications:  Medications Prior to Admission   Medication Sig Dispense Refill Last Dose    allopurinoL (ZYLOPRIM) 100 MG tablet Take 1 tablet (100 mg total) by mouth once daily. 30 tablet 11 Past Week    EScitalopram oxalate (LEXAPRO) 20 MG tablet Take 1 tablet (20 mg  "total) by mouth once daily. 90 tablet 0 Past Week    ezetimibe (ZETIA) 10 mg tablet Take 1 tablet (10 mg total) by mouth once daily. Cholesterol 90 tablet 3 Past Week    levothyroxine (SYNTHROID) 100 MCG tablet Take 1 tablet (100 mcg total) by mouth before breakfast. 30 tablet 11 Past Week    pantoprazole (PROTONIX) 40 MG tablet TAKE 1 TABLET(40 MG) BY MOUTH EVERY DAY (Patient taking differently: Take 40 mg by mouth once daily.) 90 tablet 1 Past Week    tirzepatide 5 mg/0.5 mL PnIj INJECT 5 MG INTO THE SKIN ONCE WEEKLY. (Patient taking differently: Inject 5 mg into the skin once a week. INJECT 5 MG INTO THE SKIN ONCE WEEKLY.) 4 pen 3 Past Month    indomethacin (INDOCIN) 25 MG capsule Take 1 capsule (25 mg total) by mouth 2 (two) times daily as needed. (Patient not taking: Reported on 3/2/2023) 20 capsule 0 Not Taking    levonorgestrel (MIRENA) 20 mcg/24 hr (5 years) IUD 1 each by Intrauterine route once.       pen needle, diabetic (COMFORT EZ PEN NEEDLES) 32 gauge x 5/32" Ndle Use 1x weekly. 100 each 1 Unknown       Inpatient Medications:   EScitalopram oxalate  20 mg Oral Daily    levothyroxine  100 mcg Oral Before breakfast    lisinopriL  10 mg Oral Daily    pantoprazole  40 mg Oral Daily    piperacillin-tazobactam (ZOSYN) IVPB  4.5 g Intravenous Q8H     acetaminophen, dextrose 10%, dextrose 10%, glucagon (human recombinant), glucose, glucose, HYDROmorphone, insulin aspart U-100, melatonin, naloxone, ondansetron, polyethylene glycol, senna-docusate 8.6-50 mg, simethicone, sodium chloride 0.9%    Review of patient's allergies indicates:  No Known Allergies    Social History:   Social History     Occupational History    Not on file   Tobacco Use    Smoking status: Former     Types: Cigarettes     Quit date: 4/3/1998     Years since quittin.9    Smokeless tobacco: Never   Substance and Sexual Activity    Alcohol use: Yes     Comment: occasional    Drug use: No    Sexual activity: Yes     Partners: Male     Birth " "control/protection: I.U.D.       Family History:   Family History   Problem Relation Age of Onset    Hypertension Mother     Asthma Mother     Arthritis Mother     Diabetes Maternal Grandmother     Heart disease Maternal Grandmother         CHF    Breast cancer Neg Hx     Colon cancer Neg Hx     Ovarian cancer Neg Hx     Cancer Neg Hx     Allergic rhinitis Neg Hx     Allergies Neg Hx     Angioedema Neg Hx     Atopy Neg Hx     Eczema Neg Hx     Immunodeficiency Neg Hx     Rhinitis Neg Hx     Urticaria Neg Hx     Lupus Neg Hx     Psoriasis Neg Hx     Melanoma Neg Hx        Review of Systems:  A 10 point review of systems was performed and was normal, except as mentioned in the HPI, including constitutional, HEENT, heme, lymph, cardiovascular, respiratory, gastrointestinal, genitourinary, neurologic, endocrine, psychiatric and musculoskeletal.      OBJECTIVE:    Physical Exam:  24 Hour Vital Sign Ranges: Temp:  [98.1 °F (36.7 °C)-99.8 °F (37.7 °C)] 98.6 °F (37 °C)  Pulse:  [54-75] 75  Resp:  [16-24] 16  SpO2:  [91 %-98 %] 91 %  BP: (151-171)/(71-92) 160/89  Most recent vitals: BP (!) 160/89   Pulse 75   Temp 98.6 °F (37 °C)   Resp 16   Ht 5' 7" (1.702 m)   Wt (!) 194.1 kg (428 lb)   LMP 02/28/2023   SpO2 (!) 91%   Breastfeeding No   BMI 67.03 kg/m²    GEN:  Super morbid obesity appears uncomfortable and lethargic due to recent narcotic administration   HEENT: PERRL, sclera anicteric, oral mucosa pink and moist without lesion  NECK: trachea midline; Good ROM  CV: regular rate and rhythm, no murmurs or gallops  RESP: clear to auscultation bilaterally, no wheezes, rhonci or rales  ABD: soft, mild tenderness to palpation diffusely non-distended, normal bowel sounds  EXT: no swelling or edema, 2+ pulses distally  SKIN: no rashes or jaundice  PSYCH: normal affect    Labs:   Recent Labs     03/01/23 1942 03/02/23 0927   WBC 8.95 11.82   MCV 91 91    231     Recent Labs     03/01/23 1942 03/02/23 0927   NA " 134* 138   K 4.9 4.4   CL 99 102   CO2 24 26   BUN 11 9   * 153*     No results for input(s): ALB in the last 72 hours.    Invalid input(s): ALKP, SGOT, SGPT, TBIL, DBIL, TPRO  No results for input(s): PT, INR, PTT in the last 72 hours.      Radiology Review:  CTA Chest Non-Coronary (PE Studies)   Final Result      X-Ray Chest AP Portable   Final Result      US Abdomen Limited (Gallbladder)   Final Result      MRI MRCP Without Contrast    (Results Pending)         IMPRESSION / RECOMMENDATIONS:  Biliary pancreatitis with possible cholecystitis rule out common bile duct stone  -agree with Zosyn.  Recommend IV fluids.  -we will give bolus of fluid now and recommend continuing IV fluids at 175 cc/hour  -EUS tomorrow    Thank you for this consult.    Jorje Jerome  3/2/2023  6:49 PM

## 2023-03-03 NOTE — PLAN OF CARE
03/03/23 0900   Patient Assessment/Suction   Level of Consciousness (AVPU) alert   Respiratory Effort Unlabored   Expansion/Accessory Muscles/Retractions no use of accessory muscles   All Lung Fields Breath Sounds Anterior:;Lateral:;diminished   Cough Frequency no cough   Cough Type nonproductive   PRE-TX-O2   Device (Oxygen Therapy) room air   SpO2 (!) 92 %   Pulse Oximetry Type Continuous   $ Pulse Oximetry - Multiple Charge Pulse Oximetry - Multiple   Pulse 80   Resp 18   Preset CPAP/BiPAP Settings   Mode Of Delivery CPAP   $ CPAP/BiPAP Daily Charge BiPAP/CPAP Daily   $ Initial CPAP/BiPAP Setup? No   $ Is patient using? Other (see comments)  (wears HS)   Education   $ Education Bronchodilator;15 min   Respiratory Evaluation   $ Care Plan Tech Time 15 min   $ Eval/Re-eval Charges Evaluation   Evaluation For New Orders

## 2023-03-03 NOTE — TRANSFER OF CARE
"Anesthesia Transfer of Care Note    Patient: Lavinia Herrera    Procedure(s) Performed: Procedure(s) (LRB):  ULTRASOUND, UPPER GI TRACT, ENDOSCOPIC (N/A)    Patient location: GI    Anesthesia Type: MAC    Transport from OR: Transported from OR on room air with adequate spontaneous ventilation    Post pain: adequate analgesia    Post assessment: no apparent anesthetic complications    Post vital signs: stable    Level of consciousness: responds to stimulation    Nausea/Vomiting: no nausea/vomiting    Complications: none    Transfer of care protocol was followed      Last vitals:   Visit Vitals  /60 (BP Location: Left arm, Patient Position: Lying)   Pulse 77   Temp 37.8 °C (100 °F) (Oral)   Resp (!) 22   Ht 5' 7" (1.702 m)   Wt (!) 194.1 kg (428 lb)   LMP 02/28/2023   SpO2 96%   Breastfeeding No   BMI 67.03 kg/m²     "

## 2023-03-03 NOTE — ANESTHESIA PREPROCEDURE EVALUATION
03/03/2023  Lavinia Herrera is a 48 y.o., female.      Patient Active Problem List   Diagnosis    Snoring    Elevated blood pressure    Seasonal allergies    Impaired fasting blood sugar    GERD (gastroesophageal reflux disease)    Morbid obesity with BMI of 60.0-69.9, adult    Hypothyroidism    Grief    Diabetes mellitus    Cholelithiasis with acute cholecystitis       Past Surgical History:   Procedure Laterality Date    MANDIBLE OSTEOTOMY      TYMPANOSTOMY TUBE PLACEMENT  6/30/14        Tobacco Use:  The patient  reports that she quit smoking about 24 years ago. She has never used smokeless tobacco.     Results for orders placed or performed during the hospital encounter of 03/01/23   EKG 12-lead    Collection Time: 03/01/23  5:55 PM    Narrative    Test Reason : R07.9,    Vent. Rate : 056 BPM     Atrial Rate : 056 BPM     P-R Int : 190 ms          QRS Dur : 086 ms      QT Int : 466 ms       P-R-T Axes : 061 007 045 degrees     QTc Int : 449 ms    Sinus bradycardia with sinus arrhythmia  Possible Left atrial enlargement  Borderline Abnormal ECG  When compared with ECG of 15-MAY-2014 11:42,  No significant change was found  Confirmed by Lazaro Russ MD ([id number]) on 3/2/2023 4:20:19 PM    Referred By: [email or web address]   SELF           Confirmed By:Lazaro Russ MD        Imaging Results          CTA Chest Non-Coronary (PE Studies) (Final result)  Result time 03/02/23 15:33:13    Final result by Torres Torres Jr., MD (03/02/23 15:33:13)                 Narrative:    CMS MANDATED QUALITY DATA-CT RADIATION-436    HISTORY: Elevated d-dimer.    FINDINGS: Thin axial imaging through the chest was performed with 100 mL of Omnipaque 350 IV contrast, with sagittal and coronal images, MIPS, and or 3D reconstructions performed. All CT exams at this facility use dose modulation, iterative reconstruction,  and or weight based dosing when appropriate to reduce radiation dose to as low as reasonably achievable.    No CT evidence of acute pulmonary thromboembolism, although the secondary and tertiary order branch vessels are limited by timing of the contrast bolus and quantum noise morphology. No evidence of central obstructing embolic formation is noted. Three-vessel central mediastinum are unremarkable.    The pulmonary parenchymal lung was sinus demonstrate no evidence of mass, infiltrate, or pleural effusion.    The cardiac chambers maintain normal size and overall configuration. No evidence of significant pericardial or pleural fluid.    Chronic degenerative spondylosis changes of the thoracic spine are noted.    The liver is fatty-replaced. No evidence of adrenal enlargement.    Patient is of large body habitus limiting diagnostic inspection.        IMPRESSION:  1. No convincing findings of acute pulmonary thromboembolism, although secondary and tertiary branch evaluation limited by timing of the contrast bolus and quantum noise morphology.  2. No acute intrathoracic process..    Electronically signed by:  Torres Torres MD  3/2/2023 3:33 PM CST Workstation: [id number]                             X-Ray Chest AP Portable (Final result)  Result time 03/02/23 08:57:34    Final result by Torres Torres Jr., MD (03/02/23 08:57:34)                 Narrative:    CHEST X-RAY SINGLE VIEW    HISTORY: Chest pain    FINDINGS:   A single view of the chest was performed without the benefit of previous comparison.  The heart size and pulmonary vascularity are within the range of normal.  There is no significant hilar nor mediastinal process.  The aerated lungs are well expanded and clear.  The right and left CP angles are rather sharp.  The osseous structures show nothing unusual.    IMPRESSION:   Negative chest.    Electronically signed by:  Torres Torres MD  3/2/2023 8:57 AM CST Workstation: [id number]                              US Abdomen Limited (Gallbladder) (Final result)  Result time 03/01/23 20:09:48    Final result by Milvia Villanueva MD (03/01/23 20:09:48)                 Narrative:    EXAM DESCRIPTION:  US ABDOMEN LIMITED    CLINICAL HISTORY:  48 years  Female;  pain    TECHNIQUE: Abdominal ultrasound was performed.    COMPARISON: None.    FINDINGS:  Pancreas: The body the pancreas appears slightly hyperechoic. The head and tail are not well seen.  Liver: Liver is enlarged measuring 22.3 cm in length. There is increased echogenicity consistent with fatty infiltration. Portal vein is patent with hepatopedal flow. Overall the liver is not well seen..  Gallbladder: Gallstones are present in the gallbladder. The largest measures 3 cm. Positive sonographic Chaudhary's. Gallbladder wall is 1.2 mm. No pericholecystic fluid  Common bile duct: 4.7 mm.  Right kidney: The kidney measures 13.0 x 5.1 x 5.6 cm. Echogenicity and blood flow appear normal.. No hydronephrosis.  Aorta:Visualized portions are within normal limits.  IVC: Visualized portions are within normal limits.  Ascites:  No free fluid.    IMPRESSION:  1.  Cholelithiasis with positive sonographic Chaudhary's. No gallbladder wall thickening or pericholecystic fluid.  2.  Hepatomegaly with fatty infiltration of the liver.    Electronically signed by:  Milvia Villanueva MD  3/1/2023 8:09 PM [employer] Workstation: [id number]                               Lab Results   Component Value Date    WBC 24.18 (H) 03/03/2023    HGB 11.5 (L) 03/03/2023    HCT 37.4 03/03/2023    MCV 92 03/03/2023     03/03/2023     BMP  Lab Results   Component Value Date     (L) 03/03/2023    K 4.1 03/03/2023     03/03/2023    CO2 25 03/03/2023    BUN 10 03/03/2023    CREATININE 1.0 03/03/2023    CALCIUM 7.7 (L) 03/03/2023    ANIONGAP 8 03/03/2023     (H) 03/03/2023     (H) 03/02/2023     (H) 03/01/2023       No results found for this or any previous visit.            Pre-op  Assessment    I have reviewed the Patient Summary Reports.     I have reviewed the Nursing Notes. I have reviewed the NPO Status.   I have reviewed the Medications.     Review of Systems  Anesthesia Hx:  Denies Family Hx of Anesthesia complications.   Denies Personal Hx of Anesthesia complications.   Social:  Former Smoker    Hematology/Oncology:         -- Anemia:   Cardiovascular:   Hypertension ECG has been reviewed.    Pulmonary:   Sleep Apnea suspected   Hepatic/GI:   GERD    Endocrine:   Diabetes Hypothyroidism  Morbid Obesity / BMI > 40      Physical Exam  General: Well nourished, Cooperative, Alert and Oriented    Airway:  Mallampati: II   Mouth Opening: Normal  TM Distance: Normal  Tongue: Normal  Neck ROM: Normal ROM    Dental:  Intact    Chest/Lungs:  Clear to auscultation    Heart:  Rate: Normal  Rhythm: Regular Rhythm  Sounds: Normal        Anesthesia Plan  Type of Anesthesia, risks & benefits discussed:    Anesthesia Type: Gen ETT  Intra-op Monitoring Plan: Standard ASA Monitors  Post Op Pain Control Plan: multimodal analgesia  Induction:  IV  Airway Plan: Video and Direct  Informed Consent: Informed consent signed with the Patient and all parties understand the risks and agree with anesthesia plan.  All questions answered.   ASA Score: 3  Anesthesia Plan Notes: LUCAS precautions  Multimodal with ketamine -- NO ofirmev  sugammadex reversal      Ready For Surgery From Anesthesia Perspective.     .

## 2023-03-04 LAB
ALBUMIN SERPL BCP-MCNC: 2.8 G/DL (ref 3.5–5.2)
ALP SERPL-CCNC: 131 U/L (ref 55–135)
ALT SERPL W/O P-5'-P-CCNC: 137 U/L (ref 10–44)
ANION GAP SERPL CALC-SCNC: 10 MMOL/L (ref 8–16)
AST SERPL-CCNC: 61 U/L (ref 10–40)
BASOPHILS # BLD AUTO: 0.03 K/UL (ref 0–0.2)
BASOPHILS NFR BLD: 0.2 % (ref 0–1.9)
BILIRUB SERPL-MCNC: 0.9 MG/DL (ref 0.1–1)
BUN SERPL-MCNC: 19 MG/DL (ref 6–20)
CALCIUM SERPL-MCNC: 7.5 MG/DL (ref 8.7–10.5)
CHLORIDE SERPL-SCNC: 100 MMOL/L (ref 95–110)
CO2 SERPL-SCNC: 22 MMOL/L (ref 23–29)
CREAT SERPL-MCNC: 1.6 MG/DL (ref 0.5–1.4)
DIFFERENTIAL METHOD: ABNORMAL
EOSINOPHIL # BLD AUTO: 0.4 K/UL (ref 0–0.5)
EOSINOPHIL NFR BLD: 2.2 % (ref 0–8)
ERYTHROCYTE [DISTWIDTH] IN BLOOD BY AUTOMATED COUNT: 15.2 % (ref 11.5–14.5)
EST. GFR  (NO RACE VARIABLE): 39.3 ML/MIN/1.73 M^2
GLUCOSE SERPL-MCNC: 129 MG/DL (ref 70–110)
GLUCOSE SERPL-MCNC: 158 MG/DL (ref 70–110)
GLUCOSE SERPL-MCNC: 162 MG/DL (ref 70–110)
GLUCOSE SERPL-MCNC: 165 MG/DL (ref 70–110)
GLUCOSE SERPL-MCNC: 170 MG/DL (ref 70–110)
HCT VFR BLD AUTO: 33.6 % (ref 37–48.5)
HGB BLD-MCNC: 10.3 G/DL (ref 12–16)
IMM GRANULOCYTES # BLD AUTO: 0.17 K/UL (ref 0–0.04)
IMM GRANULOCYTES NFR BLD AUTO: 1 % (ref 0–0.5)
LIPASE SERPL-CCNC: 54 U/L (ref 4–60)
LYMPHOCYTES # BLD AUTO: 1.2 K/UL (ref 1–4.8)
LYMPHOCYTES NFR BLD: 7.1 % (ref 18–48)
MAGNESIUM SERPL-MCNC: 1.9 MG/DL (ref 1.6–2.6)
MCH RBC QN AUTO: 28.2 PG (ref 27–31)
MCHC RBC AUTO-ENTMCNC: 30.7 G/DL (ref 32–36)
MCV RBC AUTO: 92 FL (ref 82–98)
MONOCYTES # BLD AUTO: 1.1 K/UL (ref 0.3–1)
MONOCYTES NFR BLD: 6.8 % (ref 4–15)
NEUTROPHILS # BLD AUTO: 14 K/UL (ref 1.8–7.7)
NEUTROPHILS NFR BLD: 82.7 % (ref 38–73)
NRBC BLD-RTO: 0 /100 WBC
PLATELET # BLD AUTO: 190 K/UL (ref 150–450)
PMV BLD AUTO: 10.9 FL (ref 9.2–12.9)
POTASSIUM SERPL-SCNC: 4.1 MMOL/L (ref 3.5–5.1)
PROT SERPL-MCNC: 6.3 G/DL (ref 6–8.4)
RBC # BLD AUTO: 3.65 M/UL (ref 4–5.4)
SODIUM SERPL-SCNC: 132 MMOL/L (ref 136–145)
WBC # BLD AUTO: 16.85 K/UL (ref 3.9–12.7)

## 2023-03-04 PROCEDURE — 25000003 PHARM REV CODE 250: Performed by: SURGERY

## 2023-03-04 PROCEDURE — 36415 COLL VENOUS BLD VENIPUNCTURE: CPT | Performed by: INTERNAL MEDICINE

## 2023-03-04 PROCEDURE — 85025 COMPLETE CBC W/AUTO DIFF WBC: CPT | Performed by: INTERNAL MEDICINE

## 2023-03-04 PROCEDURE — 83735 ASSAY OF MAGNESIUM: CPT | Performed by: INTERNAL MEDICINE

## 2023-03-04 PROCEDURE — 25000003 PHARM REV CODE 250: Performed by: INTERNAL MEDICINE

## 2023-03-04 PROCEDURE — 99900031 HC PATIENT EDUCATION (STAT)

## 2023-03-04 PROCEDURE — 99900035 HC TECH TIME PER 15 MIN (STAT)

## 2023-03-04 PROCEDURE — 80053 COMPREHEN METABOLIC PANEL: CPT | Performed by: SURGERY

## 2023-03-04 PROCEDURE — 63600175 PHARM REV CODE 636 W HCPCS: Performed by: INTERNAL MEDICINE

## 2023-03-04 PROCEDURE — C9113 INJ PANTOPRAZOLE SODIUM, VIA: HCPCS | Performed by: SURGERY

## 2023-03-04 PROCEDURE — 63600175 PHARM REV CODE 636 W HCPCS: Performed by: SURGERY

## 2023-03-04 PROCEDURE — 12000002 HC ACUTE/MED SURGE SEMI-PRIVATE ROOM

## 2023-03-04 PROCEDURE — 25000003 PHARM REV CODE 250: Performed by: HOSPITALIST

## 2023-03-04 PROCEDURE — 83690 ASSAY OF LIPASE: CPT | Performed by: SURGERY

## 2023-03-04 PROCEDURE — 94761 N-INVAS EAR/PLS OXIMETRY MLT: CPT

## 2023-03-04 RX ORDER — OXYCODONE HYDROCHLORIDE 5 MG/1
10 TABLET ORAL EVERY 6 HOURS PRN
Status: DISCONTINUED | OUTPATIENT
Start: 2023-03-04 | End: 2023-03-06

## 2023-03-04 RX ADMIN — HYDROCODONE BITARTRATE AND ACETAMINOPHEN 1 TABLET: 5; 325 TABLET ORAL at 06:03

## 2023-03-04 RX ADMIN — LEVOTHYROXINE SODIUM 100 MCG: 0.1 TABLET ORAL at 05:03

## 2023-03-04 RX ADMIN — HYDROMORPHONE HYDROCHLORIDE 1 MG: 1 INJECTION, SOLUTION INTRAMUSCULAR; INTRAVENOUS; SUBCUTANEOUS at 02:03

## 2023-03-04 RX ADMIN — SIMETHICONE 80 MG: 80 TABLET, CHEWABLE ORAL at 10:03

## 2023-03-04 RX ADMIN — HYDROMORPHONE HYDROCHLORIDE 1 MG: 1 INJECTION, SOLUTION INTRAMUSCULAR; INTRAVENOUS; SUBCUTANEOUS at 08:03

## 2023-03-04 RX ADMIN — HYDROMORPHONE HYDROCHLORIDE 1 MG: 1 INJECTION, SOLUTION INTRAMUSCULAR; INTRAVENOUS; SUBCUTANEOUS at 09:03

## 2023-03-04 RX ADMIN — HYDROCODONE BITARTRATE AND ACETAMINOPHEN 1 TABLET: 5; 325 TABLET ORAL at 03:03

## 2023-03-04 RX ADMIN — PIPERACILLIN AND TAZOBACTAM 4.5 G: 4; .5 INJECTION, POWDER, LYOPHILIZED, FOR SOLUTION INTRAVENOUS; PARENTERAL at 05:03

## 2023-03-04 RX ADMIN — LISINOPRIL 10 MG: 2.5 TABLET ORAL at 08:03

## 2023-03-04 RX ADMIN — PANTOPRAZOLE SODIUM 40 MG: 40 INJECTION, POWDER, LYOPHILIZED, FOR SOLUTION INTRAVENOUS at 05:03

## 2023-03-04 RX ADMIN — PANTOPRAZOLE SODIUM 40 MG: 40 INJECTION, POWDER, LYOPHILIZED, FOR SOLUTION INTRAVENOUS at 04:03

## 2023-03-04 RX ADMIN — INSULIN ASPART 2 UNITS: 100 INJECTION, SOLUTION INTRAVENOUS; SUBCUTANEOUS at 02:03

## 2023-03-04 RX ADMIN — ESCITALOPRAM OXALATE 20 MG: 10 TABLET ORAL at 08:03

## 2023-03-04 RX ADMIN — HYDROCODONE BITARTRATE AND ACETAMINOPHEN 1 TABLET: 5; 325 TABLET ORAL at 12:03

## 2023-03-04 RX ADMIN — Medication 6 MG: at 09:03

## 2023-03-04 RX ADMIN — PIPERACILLIN AND TAZOBACTAM 4.5 G: 4; .5 INJECTION, POWDER, LYOPHILIZED, FOR SOLUTION INTRAVENOUS; PARENTERAL at 02:03

## 2023-03-04 RX ADMIN — OXYCODONE HYDROCHLORIDE 10 MG: 5 TABLET ORAL at 08:03

## 2023-03-04 RX ADMIN — INSULIN ASPART 2 UNITS: 100 INJECTION, SOLUTION INTRAVENOUS; SUBCUTANEOUS at 08:03

## 2023-03-04 RX ADMIN — PIPERACILLIN AND TAZOBACTAM 4.5 G: 4; .5 INJECTION, POWDER, LYOPHILIZED, FOR SOLUTION INTRAVENOUS; PARENTERAL at 09:03

## 2023-03-04 NOTE — ANESTHESIA POSTPROCEDURE EVALUATION
Anesthesia Post Evaluation    Patient: Lavinia Herrera    Procedure(s) Performed: Procedure(s) (LRB):  CHOLECYSTECTOMY, LAPAROSCOPIC (N/A)    Final Anesthesia Type: general      Patient location during evaluation: PACU  Patient participation: Yes- Able to Participate  Level of consciousness: awake and alert  Post-procedure vital signs: reviewed and stable  Pain management: adequate  Airway patency: patent    PONV status at discharge: No PONV  Anesthetic complications: no      Cardiovascular status: hemodynamically stable  Respiratory status: unassisted and spontaneous ventilation  Hydration status: euvolemic  Follow-up not needed.          Vitals Value Taken Time   /78 03/03/23 2126   Temp 37.3 °C (99.1 °F) 03/03/23 2126   Pulse 65 03/03/23 2126   Resp 16 03/03/23 2126   SpO2 98 % 03/03/23 2126         Event Time   Out of Recovery 03/03/2023 20:54:58         Pain/Tariq Score: Pain Rating Prior to Med Admin: 2 (3/3/2023  8:28 PM)  Pain Rating Post Med Admin: 4 (3/3/2023  9:37 AM)  Tariq Score: 10 (3/3/2023  8:45 PM)

## 2023-03-04 NOTE — NURSING
0700: report received, in bed with no distress  1820: complaining of pain throughout shift, medicated, pleasant and cooperative.

## 2023-03-04 NOTE — TRANSFER OF CARE
"Anesthesia Transfer of Care Note    Patient: Lavinia Herrera    Procedure(s) Performed: Procedure(s) (LRB):  CHOLECYSTECTOMY, LAPAROSCOPIC (N/A)    Patient location: PACU    Anesthesia Type: general    Transport from OR: Transported from OR on room air with adequate spontaneous ventilation    Post pain: adequate analgesia    Post assessment: no apparent anesthetic complications    Post vital signs: stable    Level of consciousness: awake, alert and oriented    Nausea/Vomiting: no nausea/vomiting    Complications: none    Transfer of care protocol was followed      Last vitals:   Visit Vitals  /77   Pulse 77   Temp 37.4 °C (99.3 °F)   Resp 17   Ht 5' 7" (1.702 m)   Wt (!) 194.1 kg (428 lb)   LMP 02/28/2023   SpO2 99%   Breastfeeding No   BMI 67.03 kg/m²     "

## 2023-03-04 NOTE — NURSING
"Call lab again about uncollected mag and calcium re draw; asked if they can come draw pt labs; response was," Everybody is upstairs. Someone will be assigned to that floor."  "

## 2023-03-04 NOTE — PROGRESS NOTES
[facility]  Gastroenterology  Progress Note    Patient Name: Lavinia Herrera  MRN: [id number]  Admission Date: 3/1/2023  Hospital Length of Stay: 1 days  Code Status: Full Code   Attending Provider: Franklin Shafer MD  Consulting Provider: CAROLINE Burk MD  Primary Care Physician: Oniel Malik MD  Principal Problem: Cholelithiasis with acute cholecystitis    Subjective:     Chief Complaint: abd pain    Interval History: Failed ERCP and cholecystectomy yesterday    Review of Systems:  No F/C  No CP  No SOB      Objective:     Vital Signs (Most Recent):  Temp: 97.6 °F (36.4 °C) (03/04/23 1126)  Pulse: (!) 58 (03/04/23 1126)  Resp: (!) 116 (03/04/23 1410)  BP: (!) 142/89 (03/04/23 1126)  SpO2: 99 % (03/04/23 1126)   Vital Signs (24h Range):  Temp:  [97.5 °F (36.4 °C)-99.1 °F (37.3 °C)] 97.6 °F (36.4 °C)  Pulse:  [55-81] 58  Resp:  [] 116  SpO2:  [97 %-99 %] 99 %  BP: (110-153)/(52-89) 142/89     Weight: (!) 194.1 kg (428 lb) (03/02/23 1415)      Intake/Output Summary (Last 24 hours) at 3/4/2023 1524  Last data filed at 3/4/2023 1127  Gross per 24 hour   Intake 1260 ml   Output --   Net 1260 ml       Lines/Drains/Airways       Peripheral Intravenous Line  Duration                  Peripheral IV - Single Lumen 03/01/23 1938 20 G Right Antecubital 2 days         Peripheral IV - Single Lumen 03/03/23 0845 22 G Anterior;Left Forearm 1 day                    Physical Exam:  NAD  Anicteric, EOMI  MMM, NC  RRR; no sveta  No wheezes, no rhonchi  Soft, nondistended, + tenderness to palpation, +ABS  No c/c  No rash, no ulcer  Afocal, moves all ext.  AAOx 4, affect wnl      Significant Labs:  CBC:   Recent Labs   Lab 03/03/23  0641 03/04/23  0713   WBC 24.18* 16.85*   HGB 11.5* 10.3*   HCT 37.4 33.6*    190     CMP:   Recent Labs   Lab 03/04/23  0713   *   CALCIUM 7.5*   ALBUMIN 2.8*   PROT 6.3   *   K 4.1   CO2 22*      BUN 19   CREATININE 1.6*   ALKPHOS 131   *    AST 61*   BILITOT 0.9     Coagulation: No results for input(s): PT, INR, APTT in the last 48 hours.      Significant Imaging:  Imaging reviewed in Epic.      Assessment/Plan:     Choledocholithiasis and cholecystitis - s/p cholecystectomy on 3/3.  Failed ERCP on 3/3.  Plan for repeat ERCP on Monday        CAROLINE Burk MD  Gastroenterology  [facility]

## 2023-03-04 NOTE — PROVATION PATIENT INSTRUCTIONS
Discharge Summary/Instructions after an Endoscopic Procedure  Patient Name: Lavinia Herrera  Patient MRN: [id number]  Patient YOB: 1975  Friday, March 3, 2023  Dc Riddle III, MD  RESTRICTIONS:  During your procedure today, you received medications for sedation.  These   medications may affect your judgment, balance and coordination.  Therefore,   for 24 hours, you have the following restrictions:   - DO NOT drive a car, operate machinery, make legal/financial decisions,   sign important papers or drink alcohol.    ACTIVITY:  Today: no heavy lifting, straining or running due to procedural   sedation/anesthesia.  The following day: return to full activity including work.  DIET:  Eat and drink normally unless instructed otherwise.     TREATMENT FOR COMMON SIDE EFFECTS:  - Mild abdominal pain, nausea, belching, bloating or excessive gas:  rest,   eat lightly and use a heating pad.  - Sore Throat: treat with throat lozenges and/or gargle with warm salt   water.  - Because air was used during the procedure, expelling large amounts of air   from your rectum or belching is normal.  - If a bowel prep was taken, you may not have a bowel movement for 1-3 days.    This is normal.  SYMPTOMS TO WATCH FOR AND REPORT TO YOUR PHYSICIAN:  1. Abdominal pain or bloating, other than gas cramps.  2. Chest pain.  3. Back pain.  4. Signs of infection such as: chills or fever occurring within 24 hours   after the procedure.  5. Rectal bleeding, which would show as bright red, maroon, or black stools.   (A tablespoon of blood from the rectum is not serious, especially if   hemorrhoids are present.)  6. Vomiting.  7. Weakness or dizziness.  GO DIRECTLY TO THE NEAREST EMERGENCY ROOM IF YOU HAVE ANY OF THE FOLLOWING:      Difficulty breathing              Chills and/or fever over 101 F   Persistent vomiting and/or vomiting blood   Severe abdominal pain   Severe chest pain   Black, tarry stools   Bleeding- more than one  tablespoon   Any other symptom or condition that you feel may need urgent attention  Your doctor recommends these additional instructions:  If any biopsies were taken, your doctors clinic will contact you in 1 to 2   weeks with any results.  - Continue present medications. Continue antibiotics.   - Proceed w/ lap yesica today as LFTs improving and cholecystitis seen on EUS   this AM. Will place on PPI and re-attempt Monday in a few days. Hopefully   by then swelling in this region will have subsided and may consider   attempting to prone patient.   - Transport patient to OR.  For questions, problems or results please call your physician - Dc Riddle III, MD at Work:  [phone].  [facility], EMERGENCY ROOM PHONE NUMBER: [phone]  IF A COMPLICATION OR EMERGENCY SITUATION ARISES AND YOU ARE UNABLE TO REACH   YOUR PHYSICIAN - GO DIRECTLY TO THE EMERGENCY ROOM.  Dc Riddle III, MD  3/3/2023 6:40:31 PM  This report has been verified and signed electronically.  Dear patient,  As a result of recent federal legislation (The Federal Cures Act), you may   receive lab or pathology results from your procedure in your [facility]   account before your physician is able to contact you. Your physician or   their representative will relay the results to you with their   recommendations at their soonest availability.  Thank you,  PROVATION

## 2023-03-04 NOTE — CARE UPDATE
03/04/23 0725   Patient Assessment/Suction   Level of Consciousness (AVPU) alert   PRE-TX-O2   Device (Oxygen Therapy) room air   SpO2 97 %   Pulse Oximetry Type Intermittent   $ Pulse Oximetry - Multiple Charge Pulse Oximetry - Multiple   Pulse (!) 55   Resp (!) 21   Preset CPAP/BiPAP Settings   Mode Of Delivery Standby  (home unit)   Education   $ Education 15 min   Respiratory Evaluation   $ Care Plan Tech Time 15 min

## 2023-03-04 NOTE — BRIEF OP NOTE
[facility]  Surgery Department  Operative Note    SUMMARY     Date of Procedure: 3/3/2023     Procedure: Procedure(s) (LRB):  CHOLECYSTECTOMY, LAPAROSCOPIC (N/A)     Surgeon(s) and Role:     * Cade Elkins Jr., MD - Primary     * Tomás Mitchell III, MD - Assisting        Pre-Operative Diagnosis: Calculus of gallbladder with acute cholecystitis and obstruction [K80.01]    Post-Operative Diagnosis: Post-Op Diagnosis Codes:     * Calculus of gallbladder with acute cholecystitis and obstruction [K80.01]    Anesthesia: General    Operative Findings (including complications, if any): Inflammatory fluid within the abdomen, gallbladder acutely inflamed, large impacted stone at the infundibulum, critical view of safety achieved, spillage of bile occurred but no stones, separate branch of the artery was clipped    Of note inadvertent injury to the anterior gastric wall with Veress needle occurred, no significant leakage of bleeding was encountered at the beginning or at the end of the case.    Description of Technical Procedures: Lap laparoscopic cholecystectomy    Significant Surgical Tasks Conducted by the Assistant(s), if Applicable: Due the patient's body habitus and severe inflammation Dr. Mitchell provided required assistance with exposure and retraction.    Estimated Blood Loss (EBL): * No values recorded between 3/3/2023  6:47 PM and 3/3/2023  7:54 PM *           Implants: * No implants in log *    Specimens:   Specimen (24h ago, onward)       Start     Ordered    03/03/23 1907  Specimen to Pathology - Surgery  Once        Comments: Pre-op Diagnosis: Calculus of gallbladder with acute cholecystitis and obstruction [K80.01]Procedure(s):CHOLECYSTECTOMY, LAPAROSCOPIC Number of specimens: 1Name of specimens: 1. Gallbladder     Question:  Release to patient  Answer:  Immediate    03/03/23 1937                            Condition: Stable    Disposition: PACU - hemodynamically  stable.    Attestation: I was present and scrubbed for the entire procedure.

## 2023-03-05 PROBLEM — T88.4XXA HARD TO INTUBATE: Status: RESOLVED | Noted: 2023-03-03 | Resolved: 2023-03-05

## 2023-03-05 LAB
BASOPHILS # BLD AUTO: 0.04 K/UL (ref 0–0.2)
BASOPHILS NFR BLD: 0.3 % (ref 0–1.9)
DIFFERENTIAL METHOD: ABNORMAL
EOSINOPHIL # BLD AUTO: 0.4 K/UL (ref 0–0.5)
EOSINOPHIL NFR BLD: 2.9 % (ref 0–8)
ERYTHROCYTE [DISTWIDTH] IN BLOOD BY AUTOMATED COUNT: 15.1 % (ref 11.5–14.5)
GLUCOSE SERPL-MCNC: 154 MG/DL (ref 70–110)
GLUCOSE SERPL-MCNC: 160 MG/DL (ref 70–110)
GLUCOSE SERPL-MCNC: 170 MG/DL (ref 70–110)
GLUCOSE SERPL-MCNC: 177 MG/DL (ref 70–110)
HCT VFR BLD AUTO: 32.8 % (ref 37–48.5)
HGB BLD-MCNC: 10.2 G/DL (ref 12–16)
IMM GRANULOCYTES # BLD AUTO: 0.11 K/UL (ref 0–0.04)
IMM GRANULOCYTES NFR BLD AUTO: 0.8 % (ref 0–0.5)
LYMPHOCYTES # BLD AUTO: 0.8 K/UL (ref 1–4.8)
LYMPHOCYTES NFR BLD: 6.1 % (ref 18–48)
MAGNESIUM SERPL-MCNC: 2 MG/DL (ref 1.6–2.6)
MCH RBC QN AUTO: 28.4 PG (ref 27–31)
MCHC RBC AUTO-ENTMCNC: 31.1 G/DL (ref 32–36)
MCV RBC AUTO: 91 FL (ref 82–98)
MONOCYTES # BLD AUTO: 0.9 K/UL (ref 0.3–1)
MONOCYTES NFR BLD: 6.6 % (ref 4–15)
NEUTROPHILS # BLD AUTO: 11.6 K/UL (ref 1.8–7.7)
NEUTROPHILS NFR BLD: 83.3 % (ref 38–73)
NRBC BLD-RTO: 0 /100 WBC
PLATELET # BLD AUTO: 239 K/UL (ref 150–450)
PLATELET BLD QL SMEAR: ABNORMAL
PMV BLD AUTO: 10.8 FL (ref 9.2–12.9)
RBC # BLD AUTO: 3.59 M/UL (ref 4–5.4)
WBC # BLD AUTO: 13.87 K/UL (ref 3.9–12.7)

## 2023-03-05 PROCEDURE — 94761 N-INVAS EAR/PLS OXIMETRY MLT: CPT

## 2023-03-05 PROCEDURE — 63600175 PHARM REV CODE 636 W HCPCS: Performed by: HOSPITALIST

## 2023-03-05 PROCEDURE — 63600175 PHARM REV CODE 636 W HCPCS: Performed by: SURGERY

## 2023-03-05 PROCEDURE — 83735 ASSAY OF MAGNESIUM: CPT | Performed by: SURGERY

## 2023-03-05 PROCEDURE — 12000002 HC ACUTE/MED SURGE SEMI-PRIVATE ROOM

## 2023-03-05 PROCEDURE — 25000003 PHARM REV CODE 250: Performed by: HOSPITALIST

## 2023-03-05 PROCEDURE — 82962 GLUCOSE BLOOD TEST: CPT

## 2023-03-05 PROCEDURE — 99900035 HC TECH TIME PER 15 MIN (STAT)

## 2023-03-05 PROCEDURE — 85025 COMPLETE CBC W/AUTO DIFF WBC: CPT | Performed by: SURGERY

## 2023-03-05 PROCEDURE — 25000003 PHARM REV CODE 250: Performed by: SURGERY

## 2023-03-05 PROCEDURE — C9113 INJ PANTOPRAZOLE SODIUM, VIA: HCPCS | Performed by: SURGERY

## 2023-03-05 PROCEDURE — 94660 CPAP INITIATION&MGMT: CPT

## 2023-03-05 PROCEDURE — 36415 COLL VENOUS BLD VENIPUNCTURE: CPT | Performed by: SURGERY

## 2023-03-05 RX ADMIN — INSULIN ASPART 1 UNITS: 100 INJECTION, SOLUTION INTRAVENOUS; SUBCUTANEOUS at 09:03

## 2023-03-05 RX ADMIN — ESCITALOPRAM OXALATE 20 MG: 10 TABLET ORAL at 08:03

## 2023-03-05 RX ADMIN — HYDROMORPHONE HYDROCHLORIDE 1 MG: 1 INJECTION, SOLUTION INTRAMUSCULAR; INTRAVENOUS; SUBCUTANEOUS at 10:03

## 2023-03-05 RX ADMIN — PIPERACILLIN AND TAZOBACTAM 4.5 G: 4; .5 INJECTION, POWDER, LYOPHILIZED, FOR SOLUTION INTRAVENOUS; PARENTERAL at 09:03

## 2023-03-05 RX ADMIN — SIMETHICONE 80 MG: 80 TABLET, CHEWABLE ORAL at 08:03

## 2023-03-05 RX ADMIN — POLYETHYLENE GLYCOL 3350 17 G: 17 POWDER, FOR SOLUTION ORAL at 08:03

## 2023-03-05 RX ADMIN — LISINOPRIL 10 MG: 2.5 TABLET ORAL at 08:03

## 2023-03-05 RX ADMIN — PIPERACILLIN AND TAZOBACTAM 4.5 G: 4; .5 INJECTION, POWDER, LYOPHILIZED, FOR SOLUTION INTRAVENOUS; PARENTERAL at 02:03

## 2023-03-05 RX ADMIN — PANTOPRAZOLE SODIUM 40 MG: 40 INJECTION, POWDER, LYOPHILIZED, FOR SOLUTION INTRAVENOUS at 05:03

## 2023-03-05 RX ADMIN — HYDROMORPHONE HYDROCHLORIDE 1 MG: 1 INJECTION, SOLUTION INTRAMUSCULAR; INTRAVENOUS; SUBCUTANEOUS at 04:03

## 2023-03-05 RX ADMIN — HYDROMORPHONE HYDROCHLORIDE 1 MG: 1 INJECTION, SOLUTION INTRAMUSCULAR; INTRAVENOUS; SUBCUTANEOUS at 11:03

## 2023-03-05 RX ADMIN — LEVOTHYROXINE SODIUM 100 MCG: 0.1 TABLET ORAL at 05:03

## 2023-03-05 RX ADMIN — OXYCODONE HYDROCHLORIDE 10 MG: 5 TABLET ORAL at 08:03

## 2023-03-05 RX ADMIN — SODIUM CHLORIDE, SODIUM LACTATE, POTASSIUM CHLORIDE, AND CALCIUM CHLORIDE: .6; .31; .03; .02 INJECTION, SOLUTION INTRAVENOUS at 04:03

## 2023-03-05 RX ADMIN — OXYCODONE HYDROCHLORIDE 10 MG: 5 TABLET ORAL at 03:03

## 2023-03-05 RX ADMIN — PIPERACILLIN AND TAZOBACTAM 4.5 G: 4; .5 INJECTION, POWDER, LYOPHILIZED, FOR SOLUTION INTRAVENOUS; PARENTERAL at 05:03

## 2023-03-05 RX ADMIN — HYDROMORPHONE HYDROCHLORIDE 1 MG: 1 INJECTION, SOLUTION INTRAMUSCULAR; INTRAVENOUS; SUBCUTANEOUS at 05:03

## 2023-03-05 RX ADMIN — SENNOSIDES AND DOCUSATE SODIUM 1 TABLET: 8.6; 5 TABLET ORAL at 08:03

## 2023-03-05 NOTE — SUBJECTIVE & OBJECTIVE
Interval History:  continues to have uncontrollable upper abdominal pain.  No vomiting.  No fevers.  Yesterday, underwent ERCP, which was unsuccessful.  A cholecystectomy was done.  A stent was deployed in the pancreatic duct.    Review of Systems   Constitutional:  Negative for chills and fever.   Respiratory:  Negative for cough and shortness of breath.    Cardiovascular:  Negative for chest pain and leg swelling.   Gastrointestinal:  Positive for abdominal pain. Negative for nausea and vomiting.   Objective:     Vital Signs (Most Recent):  Temp: 98.4 °F (36.9 °C) (03/04/23 1559)  Pulse: 63 (03/04/23 1559)  Resp: 18 (Simultaneous filing. User may not have seen previous data.) (03/04/23 1559)  BP: (!) 142/85 (03/04/23 1559)  SpO2: 98 % (03/04/23 1559)   Vital Signs (24h Range):  Temp:  [97.5 °F (36.4 °C)-99.1 °F (37.3 °C)] 98.4 °F (36.9 °C)  Pulse:  [55-77] 63  Resp:  [] 18  SpO2:  [97 %-99 %] 98 %  BP: (111-153)/(56-89) 142/85     Weight: (!) 194.1 kg (428 lb)  Body mass index is 67.03 kg/m².    Intake/Output Summary (Last 24 hours) at 3/4/2023 2002  Last data filed at 3/4/2023 1600  Gross per 24 hour   Intake 740 ml   Output --   Net 740 ml      Physical Exam  Constitutional:       General: She is not in acute distress.     Appearance: She is not ill-appearing.   Eyes:      General:         Right eye: No discharge.         Left eye: No discharge.   Neck:      Vascular: No JVD.   Cardiovascular:      Rate and Rhythm: Normal rate and regular rhythm.   Pulmonary:      Effort: Pulmonary effort is normal.      Breath sounds: Normal breath sounds.   Abdominal:      General: Abdomen is flat. Bowel sounds are normal. There is no distension.      Palpations: Abdomen is soft.      Tenderness: There is abdominal tenderness. There is no rebound.   Musculoskeletal:      Right lower leg: No edema.      Left lower leg: No edema.   Skin:     General: Skin is warm and moist.      Findings: No rash.   Neurological:       Mental Status: She is alert and oriented to person, place, and time.   Psychiatric:         Attention and Perception: Attention normal.         Mood and Affect: Mood and affect normal.         Speech: Speech normal.       Significant Labs: All pertinent labs within the past 24 hours have been reviewed.    Significant Imaging: I have reviewed all pertinent imaging results/findings within the past 24 hours.

## 2023-03-05 NOTE — CONSULTS
Patient s/p Syd Betancourt. RD provided written and verbal lowfat diet education. Patient voiced understanding as evidenced by teach back. RD also provided contact information for any further questions.

## 2023-03-05 NOTE — ASSESSMENT & PLAN NOTE
Patient's FSGs are controlled on current medication regimen.  Last A1c reviewed-   Lab Results   Component Value Date    HGBA1C 7.1 (H) 11/02/2022       Current correctional scale  Medium  Increase anti-hyperglycemic dose as follows-   Antihyperglycemics (From admission, onward)    Start     Stop Route Frequency Ordered    03/02/23 0410  insulin aspart U-100 pen 1-10 Units         -- SubQ Before meals & nightly PRN 03/02/23 0410        Hold Oral hypoglycemics while patient is in the hospital.

## 2023-03-05 NOTE — PROGRESS NOTES
[facility] Medicine  Progress Note    Patient Name: Lavinia Herrera  MRN: [id number]  Patient Class: IP- Inpatient   Admission Date: 3/1/2023  Length of Stay: 1 days  Attending Physician: Franklin Shafer MD  Primary Care Provider: Oniel Malik MD        Subjective:     Principal Problem:Cholelithiasis with acute cholecystitis        HPI:  Patient is a 48-year-old female, who presents emergency room complaining of abdominal pain.  Patient states that she is had abdominal pain for 2 days worsening without relieving factors.  She denies any nausea or vomiting.  No fevers chills.  Her past medical significant for hypothyroidism, diabetes mellitus and elevated blood pressure.  Patient denies any chest pains.  No shortness breath.    Plan to place patient in observation.  Consult general surgery.  Obtain an MRCP.  Continue IV antibiotics.  Plan per surgery    Elevated transaminase and elevated bilirubin most likely caused from cholelithiasis.  Elevated blood pressure probably related to pain discomfort will add ACE inhibitor for renal protection for diabetes and blood pressure control      Overview/Hospital Course:  No notes on file    Interval History:  continues to have uncontrollable upper abdominal pain.  No vomiting.  No fevers.  Yesterday, underwent ERCP, which was unsuccessful.  Common duct could not be cannulated.  A cholecystectomy was done.  A stent was deployed in the ventral pancreatic duct.    Review of Systems   Constitutional:  Negative for chills and fever.   Respiratory:  Negative for cough and shortness of breath.    Cardiovascular:  Negative for chest pain and leg swelling.   Gastrointestinal:  Positive for abdominal pain. Negative for nausea and vomiting.   Objective:     Vital Signs (Most Recent):  Temp: 98.4 °F (36.9 °C) (03/04/23 1559)  Pulse: 63 (03/04/23 1559)  Resp: 18 (Simultaneous filing. User may not have seen previous data.) (03/04/23 1559)  BP: (!) 142/85  (03/04/23 1559)  SpO2: 98 % (03/04/23 1559)   Vital Signs (24h Range):  Temp:  [97.5 °F (36.4 °C)-99.1 °F (37.3 °C)] 98.4 °F (36.9 °C)  Pulse:  [55-77] 63  Resp:  [] 18  SpO2:  [97 %-99 %] 98 %  BP: (111-153)/(56-89) 142/85     Weight: (!) 194.1 kg (428 lb)  Body mass index is 67.03 kg/m².    Intake/Output Summary (Last 24 hours) at 3/4/2023 2002  Last data filed at 3/4/2023 1600  Gross per 24 hour   Intake 740 ml   Output --   Net 740 ml      Physical Exam  Constitutional:       General: She is not in acute distress.     Appearance: She is not ill-appearing.   Eyes:      General:         Right eye: No discharge.         Left eye: No discharge.   Neck:      Vascular: No JVD.   Cardiovascular:      Rate and Rhythm: Normal rate and regular rhythm.   Pulmonary:      Effort: Pulmonary effort is normal.      Breath sounds: Normal breath sounds.   Abdominal:      General: Abdomen is flat. Bowel sounds are normal. There is no distension.      Palpations: Abdomen is soft.      Tenderness: There is abdominal tenderness. There is no rebound.   Musculoskeletal:      Right lower leg: No edema.      Left lower leg: No edema.   Skin:     General: Skin is warm and moist.      Findings: No rash.   Neurological:      Mental Status: She is alert and oriented to person, place, and time.   Psychiatric:         Attention and Perception: Attention normal.         Mood and Affect: Mood and affect normal.         Speech: Speech normal.       Significant Labs: All pertinent labs within the past 24 hours have been reviewed.    Significant Imaging: I have reviewed all pertinent imaging results/findings within the past 24 hours.      Assessment/Plan:      * Cholelithiasis with acute cholecystitis  GI and surgeon following.  ERCP attempted and failed.  CBD could not be cannulated.  A ventral pancreatic duct stent was placed but none in the CBD (due to edema).  A lap yesica was performed.  Monday, another attempt at biliary stent will be  done.  In the meantime, provide analgesia.    Hard to intubate  Resolved.    Type 2 diabetes mellitus without complication, without long-term current use of insulin  Patient's FSGs are uncontrolled due to hyperglycemia on current medication regimen.  Last A1c reviewed-   Lab Results   Component Value Date    HGBA1C 7.1 (H) 11/02/2022     Most recent fingerstick glucose reviewed- No results for input(s): POCTGLUCOSE in the last 24 hours.  Current correctional scale  Medium  Increase anti-hyperglycemic dose as follows-   Antihyperglycemics (From admission, onward)      Start     Stop Route Frequency Ordered    03/02/23 0410  insulin aspart U-100 pen 1-10 Units         -- SubQ Before meals & nightly PRN 03/02/23 0410          Hold Oral hypoglycemics while patient is in the hospital.    Hypothyroidism  Chronic problem. Will continue chronic medications and monitor for any changes, adjusting as needed.            Morbid obesity with BMI of 60.0-69.9, adult  Body mass index is 67.03 kg/m². Morbid obesity complicates all aspects of disease management from diagnostic modalities to treatment. Weight loss encouraged and health benefits explained to patient.           VTE Risk Mitigation (From admission, onward)           Ordered     IP VTE HIGH RISK PATIENT  Once         03/02/23 0410     Place sequential compression device  Until discontinued         03/02/23 0410                    Discharge Planning   JOSE ANTONIO: 3/4/2023     Code Status: Full Code   Is the patient medically ready for discharge?:     Reason for patient still in hospital (select all that apply): Patient trending condition, Treatment and Consult recommendations  Discharge Plan A: Home                  Franklin Shafer MD  Department of Hospital Medicine   [facility]

## 2023-03-05 NOTE — SUBJECTIVE & OBJECTIVE
Interval History:  continues with pain but it's less intense.  No vomiting.  Re-attempt at ERCP tomorrow.    Review of Systems   Constitutional:  Negative for chills and fever.   Respiratory:  Negative for cough and shortness of breath.    Cardiovascular:  Negative for chest pain and leg swelling.   Gastrointestinal:  Positive for abdominal pain. Negative for nausea and vomiting.   Objective:     Vital Signs (Most Recent):  Temp: 98.6 °F (37 °C) (03/05/23 1201)  Pulse: 66 (03/05/23 1201)  Resp: 20 (03/05/23 1517)  BP: 118/74 (03/05/23 1201)  SpO2: (!) 94 % (03/05/23 1201)   Vital Signs (24h Range):  Temp:  [98.3 °F (36.8 °C)-98.6 °F (37 °C)] 98.6 °F (37 °C)  Pulse:  [60-70] 66  Resp:  [18-24] 20  SpO2:  [94 %-98 %] 94 %  BP: (118-146)/(74-81) 118/74     Weight: (!) 194.1 kg (428 lb)  Body mass index is 67.03 kg/m².  No intake or output data in the 24 hours ending 03/05/23 1611     Physical Exam  Constitutional:       General: She is not in acute distress.     Appearance: She is not ill-appearing.   Eyes:      General:         Right eye: No discharge.         Left eye: No discharge.   Neck:      Vascular: No JVD.   Cardiovascular:      Rate and Rhythm: Normal rate and regular rhythm.   Pulmonary:      Effort: Pulmonary effort is normal.      Breath sounds: Normal breath sounds.   Abdominal:      General: Abdomen is flat. Bowel sounds are normal. There is no distension.      Palpations: Abdomen is soft.      Tenderness: There is abdominal tenderness. There is no rebound.   Musculoskeletal:      Right lower leg: No edema.      Left lower leg: No edema.   Skin:     General: Skin is warm and moist.      Findings: No rash.   Neurological:      Mental Status: She is alert and oriented to person, place, and time.   Psychiatric:         Attention and Perception: Attention normal.         Mood and Affect: Mood and affect normal.         Speech: Speech normal.       Significant Labs: All pertinent labs within the past 24 hours  have been reviewed.    Significant Imaging: I have reviewed all pertinent imaging results/findings within the past 24 hours.

## 2023-03-05 NOTE — ASSESSMENT & PLAN NOTE
GI and surgeon following.  ERCP attempted and failed.  A stent was deployed in the ventral pancreatic duct but none in the CBD (due to edema).  A lap yesica was performed.  Monday, another attempt at biliary stent will be done.  In the meantime, provide analgesia.

## 2023-03-05 NOTE — ASSESSMENT & PLAN NOTE
GI and surgeon following.  ERCP attempted and failed.  A pancreatic duct stent was placed but none in the CBD (due to edema).  A lap yesica was performed.  Monday, another attempt at biliary stent will be done.  In the meantime, provide analgesia.

## 2023-03-05 NOTE — PROGRESS NOTES
[facility]  Gastroenterology  Progress Note    Patient Name: Lavinia Herrera  MRN: [id number]  Admission Date: 3/1/2023  Hospital Length of Stay: 2 days  Code Status: Full Code   Attending Provider: Franklin Shafer MD  Consulting Provider: CAROLINE Burk MD  Primary Care Physician: Oniel Malik MD  Principal Problem: Cholelithiasis with acute cholecystitis    Subjective:     Chief Complaint: Abd pain    Interval History: Cholecysectomy on Friday. Doing much better.  Abd pain has resolved.      Review of Systems:  No F/C  No CP  No SOB  No Abd pain    Objective:     Vital Signs (Most Recent):  Temp: 98.6 °F (37 °C) (03/05/23 1201)  Pulse: 66 (03/05/23 1201)  Resp: 18 (03/05/23 1201)  BP: 118/74 (03/05/23 1201)  SpO2: (!) 94 % (03/05/23 1201)   Vital Signs (24h Range):  Temp:  [98.3 °F (36.8 °C)-98.6 °F (37 °C)] 98.6 °F (37 °C)  Pulse:  [60-70] 66  Resp:  [] 18  SpO2:  [94 %-98 %] 94 %  BP: (118-146)/(74-85) 118/74     Weight: (!) 194.1 kg (428 lb) (03/02/23 1415)      Intake/Output Summary (Last 24 hours) at 3/5/2023 1254  Last data filed at 3/4/2023 1600  Gross per 24 hour   Intake 480 ml   Output --   Net 480 ml       Lines/Drains/Airways       Peripheral Intravenous Line  Duration                  Peripheral IV - Single Lumen 03/01/23 1938 20 G Right Antecubital 3 days         Peripheral IV - Single Lumen 03/03/23 0845 22 G Anterior;Left Forearm 2 days                    Physical Exam:  NAD; morbid obesity  Anicteric, EOMI  MMM, NC  RRR; no sveta  No wheezes, no rhonchi  Soft, nondistended, nontender, +ABS  No c/c/e  No rash, no ulcer  Afocal, moves all ext.  AAOx 4, affect wnl      Significant Labs:  CBC:   Recent Labs   Lab 03/04/23  0713 03/05/23  0557   WBC 16.85* 13.87*   HGB 10.3* 10.2*   HCT 33.6* 32.8*    239     CMP:   Recent Labs   Lab 03/04/23  0713   *   CALCIUM 7.5*   ALBUMIN 2.8*   PROT 6.3   *   K 4.1   CO2 22*      BUN 19   CREATININE 1.6*    ALKPHOS 131   *   AST 61*   BILITOT 0.9     Coagulation: No results for input(s): PT, INR, APTT in the last 48 hours.      Significant Imaging:  Imaging reviewed in Epic.      Assessment/Plan:     Choledocholithiasis with pancreatitis and cholecystitis - s/p cholecystectomy on 3/3.  Failed ERCP on 3/3.  Plan for repeat ERCP on Monday.  Abd pain is no longer present.  Doing better        CAROLINE Burk MD  Gastroenterology  [facility]

## 2023-03-05 NOTE — PROGRESS NOTES
[facility] Medicine  Progress Note    Patient Name: Lavinia Herrera  MRN: [id number]  Patient Class: IP- Inpatient   Admission Date: 3/1/2023  Length of Stay: 2 days  Attending Physician: Franklin Shafer MD  Primary Care Provider: Oniel Malik MD        Subjective:     Principal Problem:Cholelithiasis with acute cholecystitis        HPI:  Patient is a 48-year-old female, who presents emergency room complaining of abdominal pain.  Patient states that she is had abdominal pain for 2 days worsening without relieving factors.  She denies any nausea or vomiting.  No fevers chills.  Her past medical significant for hypothyroidism, diabetes mellitus and elevated blood pressure.  Patient denies any chest pains.  No shortness breath.    Plan to place patient in observation.  Consult general surgery.  Obtain an MRCP.  Continue IV antibiotics.  Plan per surgery    Elevated transaminase and elevated bilirubin most likely caused from cholelithiasis.  Elevated blood pressure probably related to pain discomfort will add ACE inhibitor for renal protection for diabetes and blood pressure control      Overview/Hospital Course:  No notes on file    Interval History:  continues with pain but it's less intense.  No vomiting.  Re-attempt at ERCP tomorrow.    Review of Systems   Constitutional:  Negative for chills and fever.   Respiratory:  Negative for cough and shortness of breath.    Cardiovascular:  Negative for chest pain and leg swelling.   Gastrointestinal:  Positive for abdominal pain. Negative for nausea and vomiting.   Objective:     Vital Signs (Most Recent):  Temp: 98.6 °F (37 °C) (03/05/23 1201)  Pulse: 66 (03/05/23 1201)  Resp: 20 (03/05/23 1517)  BP: 118/74 (03/05/23 1201)  SpO2: (!) 94 % (03/05/23 1201)   Vital Signs (24h Range):  Temp:  [98.3 °F (36.8 °C)-98.6 °F (37 °C)] 98.6 °F (37 °C)  Pulse:  [60-70] 66  Resp:  [18-24] 20  SpO2:  [94 %-98 %] 94 %  BP: (118-146)/(74-81) 118/74      Weight: (!) 194.1 kg (428 lb)  Body mass index is 67.03 kg/m².  No intake or output data in the 24 hours ending 03/05/23 1611     Physical Exam  Constitutional:       General: She is not in acute distress.     Appearance: She is not ill-appearing.   Eyes:      General:         Right eye: No discharge.         Left eye: No discharge.   Neck:      Vascular: No JVD.   Cardiovascular:      Rate and Rhythm: Normal rate and regular rhythm.   Pulmonary:      Effort: Pulmonary effort is normal.      Breath sounds: Normal breath sounds.   Abdominal:      General: Abdomen is flat. Bowel sounds are normal. There is no distension.      Palpations: Abdomen is soft.      Tenderness: There is abdominal tenderness. There is no rebound.   Musculoskeletal:      Right lower leg: No edema.      Left lower leg: No edema.   Skin:     General: Skin is warm and moist.      Findings: No rash.   Neurological:      Mental Status: She is alert and oriented to person, place, and time.   Psychiatric:         Attention and Perception: Attention normal.         Mood and Affect: Mood and affect normal.         Speech: Speech normal.       Significant Labs: All pertinent labs within the past 24 hours have been reviewed.    Significant Imaging: I have reviewed all pertinent imaging results/findings within the past 24 hours.      Assessment/Plan:      * Cholelithiasis with acute cholecystitis  GI and surgeon following.  ERCP attempted and failed.  A stent was deployed in the ventral pancreatic duct but none in the CBD (due to edema).  A lap yesica was performed.  Monday, another attempt at biliary stent will be done.  In the meantime, provide analgesia.    Type 2 diabetes mellitus without complication, without long-term current use of insulin  Patient's FSGs are controlled on current medication regimen.  Last A1c reviewed-   Lab Results   Component Value Date    HGBA1C 7.1 (H) 11/02/2022       Current correctional scale  Medium  Increase  anti-hyperglycemic dose as follows-   Antihyperglycemics (From admission, onward)    Start     Stop Route Frequency Ordered    03/02/23 0410  insulin aspart U-100 pen 1-10 Units         -- SubQ Before meals & nightly PRN 03/02/23 0410        Hold Oral hypoglycemics while patient is in the hospital.    Hypothyroidism  Chronic problem. Will continue chronic medications and monitor for any changes, adjusting as needed.            Morbid obesity with BMI of 60.0-69.9, adult  Body mass index is 67.03 kg/m². Morbid obesity complicates all aspects of disease management from diagnostic modalities to treatment. Weight loss encouraged and health benefits explained to patient.           VTE Risk Mitigation (From admission, onward)         Ordered     IP VTE HIGH RISK PATIENT  Once         03/02/23 0410     Place sequential compression device  Until discontinued         03/02/23 0410                Discharge Planning   JOSE ANTONIO: 3/4/2023     Code Status: Full Code   Is the patient medically ready for discharge?:     Reason for patient still in hospital (select all that apply): Patient trending condition, Treatment and Consult recommendations  Discharge Plan A: Home                  Franklin Shafer MD  Department of Hospital Medicine   [facility]

## 2023-03-06 ENCOUNTER — ANESTHESIA EVENT (OUTPATIENT)
Dept: SURGERY | Facility: HOSPITAL | Age: 48
DRG: 417 | End: 2023-03-06
Payer: COMMERCIAL

## 2023-03-06 ENCOUNTER — ANESTHESIA (OUTPATIENT)
Dept: SURGERY | Facility: HOSPITAL | Age: 48
DRG: 417 | End: 2023-03-06
Payer: COMMERCIAL

## 2023-03-06 LAB
BASOPHILS # BLD AUTO: 0.04 K/UL (ref 0–0.2)
BASOPHILS NFR BLD: 0.2 % (ref 0–1.9)
DIFFERENTIAL METHOD: ABNORMAL
EOSINOPHIL # BLD AUTO: 0.4 K/UL (ref 0–0.5)
EOSINOPHIL NFR BLD: 2.5 % (ref 0–8)
ERYTHROCYTE [DISTWIDTH] IN BLOOD BY AUTOMATED COUNT: 15.4 % (ref 11.5–14.5)
GLUCOSE SERPL-MCNC: 152 MG/DL (ref 70–110)
GLUCOSE SERPL-MCNC: 152 MG/DL (ref 70–110)
GLUCOSE SERPL-MCNC: 156 MG/DL (ref 70–110)
GLUCOSE SERPL-MCNC: 156 MG/DL (ref 70–110)
HCT VFR BLD AUTO: 31.5 % (ref 37–48.5)
HGB BLD-MCNC: 10.1 G/DL (ref 12–16)
IMM GRANULOCYTES # BLD AUTO: 0.14 K/UL (ref 0–0.04)
IMM GRANULOCYTES NFR BLD AUTO: 0.9 % (ref 0–0.5)
LYMPHOCYTES # BLD AUTO: 1.1 K/UL (ref 1–4.8)
LYMPHOCYTES NFR BLD: 7 % (ref 18–48)
MAGNESIUM SERPL-MCNC: 2 MG/DL (ref 1.6–2.6)
MCH RBC QN AUTO: 28.9 PG (ref 27–31)
MCHC RBC AUTO-ENTMCNC: 32.1 G/DL (ref 32–36)
MCV RBC AUTO: 90 FL (ref 82–98)
MONOCYTES # BLD AUTO: 1.6 K/UL (ref 0.3–1)
MONOCYTES NFR BLD: 9.8 % (ref 4–15)
NEUTROPHILS # BLD AUTO: 12.9 K/UL (ref 1.8–7.7)
NEUTROPHILS NFR BLD: 79.6 % (ref 38–73)
NRBC BLD-RTO: 0 /100 WBC
PLATELET # BLD AUTO: 275 K/UL (ref 150–450)
PMV BLD AUTO: 10.3 FL (ref 9.2–12.9)
RBC # BLD AUTO: 3.5 M/UL (ref 4–5.4)
WBC # BLD AUTO: 16.18 K/UL (ref 3.9–12.7)

## 2023-03-06 PROCEDURE — 25000003 PHARM REV CODE 250: Performed by: SURGERY

## 2023-03-06 PROCEDURE — 12000002 HC ACUTE/MED SURGE SEMI-PRIVATE ROOM

## 2023-03-06 PROCEDURE — 85025 COMPLETE CBC W/AUTO DIFF WBC: CPT | Performed by: SURGERY

## 2023-03-06 PROCEDURE — 63600175 PHARM REV CODE 636 W HCPCS: Performed by: HOSPITALIST

## 2023-03-06 PROCEDURE — C9113 INJ PANTOPRAZOLE SODIUM, VIA: HCPCS | Performed by: SURGERY

## 2023-03-06 PROCEDURE — 94761 N-INVAS EAR/PLS OXIMETRY MLT: CPT

## 2023-03-06 PROCEDURE — 63600175 PHARM REV CODE 636 W HCPCS: Performed by: SURGERY

## 2023-03-06 PROCEDURE — 36415 COLL VENOUS BLD VENIPUNCTURE: CPT | Performed by: SURGERY

## 2023-03-06 PROCEDURE — 99900035 HC TECH TIME PER 15 MIN (STAT)

## 2023-03-06 PROCEDURE — 25000003 PHARM REV CODE 250: Performed by: HOSPITALIST

## 2023-03-06 PROCEDURE — 83735 ASSAY OF MAGNESIUM: CPT | Performed by: SURGERY

## 2023-03-06 RX ORDER — HYDROMORPHONE HYDROCHLORIDE 1 MG/ML
1 INJECTION, SOLUTION INTRAMUSCULAR; INTRAVENOUS; SUBCUTANEOUS EVERY 4 HOURS PRN
Status: DISCONTINUED | OUTPATIENT
Start: 2023-03-06 | End: 2023-03-08 | Stop reason: HOSPADM

## 2023-03-06 RX ADMIN — PIPERACILLIN AND TAZOBACTAM 4.5 G: 4; .5 INJECTION, POWDER, LYOPHILIZED, FOR SOLUTION INTRAVENOUS; PARENTERAL at 02:03

## 2023-03-06 RX ADMIN — PIPERACILLIN AND TAZOBACTAM 4.5 G: 4; .5 INJECTION, POWDER, LYOPHILIZED, FOR SOLUTION INTRAVENOUS; PARENTERAL at 11:03

## 2023-03-06 RX ADMIN — OXYCODONE HYDROCHLORIDE 10 MG: 5 TABLET ORAL at 09:03

## 2023-03-06 RX ADMIN — LISINOPRIL 10 MG: 2.5 TABLET ORAL at 09:03

## 2023-03-06 RX ADMIN — HYDROMORPHONE HYDROCHLORIDE 1 MG: 1 INJECTION, SOLUTION INTRAMUSCULAR; INTRAVENOUS; SUBCUTANEOUS at 04:03

## 2023-03-06 RX ADMIN — INSULIN ASPART 1 UNITS: 100 INJECTION, SOLUTION INTRAVENOUS; SUBCUTANEOUS at 09:03

## 2023-03-06 RX ADMIN — ESCITALOPRAM OXALATE 20 MG: 10 TABLET ORAL at 09:03

## 2023-03-06 RX ADMIN — PIPERACILLIN AND TAZOBACTAM 4.5 G: 4; .5 INJECTION, POWDER, LYOPHILIZED, FOR SOLUTION INTRAVENOUS; PARENTERAL at 05:03

## 2023-03-06 RX ADMIN — HYDROMORPHONE HYDROCHLORIDE 1 MG: 1 INJECTION, SOLUTION INTRAMUSCULAR; INTRAVENOUS; SUBCUTANEOUS at 09:03

## 2023-03-06 RX ADMIN — INSULIN ASPART 2 UNITS: 100 INJECTION, SOLUTION INTRAVENOUS; SUBCUTANEOUS at 05:03

## 2023-03-06 RX ADMIN — PANTOPRAZOLE SODIUM 40 MG: 40 INJECTION, POWDER, LYOPHILIZED, FOR SOLUTION INTRAVENOUS at 05:03

## 2023-03-06 RX ADMIN — HYDROMORPHONE HYDROCHLORIDE 1 MG: 1 INJECTION, SOLUTION INTRAMUSCULAR; INTRAVENOUS; SUBCUTANEOUS at 05:03

## 2023-03-06 RX ADMIN — PANTOPRAZOLE SODIUM 40 MG: 40 INJECTION, POWDER, LYOPHILIZED, FOR SOLUTION INTRAVENOUS at 04:03

## 2023-03-06 RX ADMIN — INSULIN ASPART 2 UNITS: 100 INJECTION, SOLUTION INTRAVENOUS; SUBCUTANEOUS at 12:03

## 2023-03-06 RX ADMIN — HYDROMORPHONE HYDROCHLORIDE 1 MG: 1 INJECTION, SOLUTION INTRAMUSCULAR; INTRAVENOUS; SUBCUTANEOUS at 12:03

## 2023-03-06 RX ADMIN — OXYCODONE HYDROCHLORIDE 10 MG: 5 TABLET ORAL at 01:03

## 2023-03-06 NOTE — PLAN OF CARE
Problem: Adult Inpatient Plan of Care  Goal: Plan of Care Review  Outcome: Ongoing, Progressing  Goal: Patient-Specific Goal (Individualized)  Outcome: Ongoing, Progressing  Goal: Absence of Hospital-Acquired Illness or Injury  Outcome: Ongoing, Progressing  Goal: Optimal Comfort and Wellbeing  Outcome: Ongoing, Progressing  Goal: Readiness for Transition of Care  Outcome: Ongoing, Progressing     Problem: Bariatric Environmental Safety  Goal: Safety Maintained with Care  Outcome: Ongoing, Progressing     Problem: Skin Injury Risk Increased  Goal: Skin Health and Integrity  Outcome: Ongoing, Progressing     Problem: Diabetes Comorbidity  Goal: Blood Glucose Level Within Targeted Range  Outcome: Ongoing, Progressing     Problem: Impaired Wound Healing  Goal: Optimal Wound Healing  Outcome: Ongoing, Progressing     Problem: Pain Acute  Goal: Acceptable Pain Control and Functional Ability  Outcome: Ongoing, Progressing

## 2023-03-06 NOTE — PROGRESS NOTES
[facility]  General Surgery  Progress Note    Subjective:     Interval History: Feeling better today. Pain much improved. Tbili normal.     Post-Op Info:  Procedure(s) (LRB):  CHOLECYSTECTOMY, LAPAROSCOPIC (N/A)   2 Days Post-Op      Medications:  Continuous Infusions:   lactated ringers 125 mL/hr at 03/05/23 0436     Scheduled Meds:   EScitalopram oxalate  20 mg Oral Daily    levothyroxine  100 mcg Oral Before breakfast    lisinopriL  10 mg Oral Daily    pantoprazole  40 mg Intravenous BID AC    piperacillin-tazobactam (ZOSYN) IVPB  4.5 g Intravenous Q8H     PRN Meds:acetaminophen, dextrose 10%, dextrose 10%, glucagon (human recombinant), glucose, glucose, HYDROmorphone, insulin aspart U-100, melatonin, naloxone, ondansetron, oxyCODONE, polyethylene glycol, senna-docusate 8.6-50 mg, simethicone, sodium chloride 0.9%     Objective:     Vital Signs (Most Recent):  Temp: 98 °F (36.7 °C) (03/05/23 1613)  Pulse: 63 (03/05/23 1613)  Resp: 19 (03/05/23 1725)  BP: (!) 161/92 (03/05/23 1613)  SpO2: 97 % (03/05/23 1613)   Vital Signs (24h Range):  Temp:  [98 °F (36.7 °C)-98.6 °F (37 °C)] 98 °F (36.7 °C)  Pulse:  [60-70] 63  Resp:  [18-24] 19  SpO2:  [94 %-98 %] 97 %  BP: (118-161)/(74-92) 161/92       Intake/Output Summary (Last 24 hours) at 3/5/2023 2006  Last data filed at 3/5/2023 1614  Gross per 24 hour   Intake 240 ml   Output --   Net 240 ml       Physical Exam  Constitutional:       General: She is not in acute distress.  Pulmonary:      Effort: Pulmonary effort is normal. No respiratory distress.   Abdominal:      Palpations: Abdomen is soft.      Tenderness: There is abdominal tenderness.      Comments: Appropriate tenderness   Neurological:      Mental Status: She is alert.       Significant Labs:  CBC:   Recent Labs   Lab 03/05/23  0557   WBC 13.87*   RBC 3.59*   HGB 10.2*   HCT 32.8*      MCV 91   MCH 28.4   MCHC 31.1*     CMP:   Recent Labs   Lab 03/04/23  0713   *   CALCIUM 7.5*    ALBUMIN 2.8*   PROT 6.3   *   K 4.1   CO2 22*      BUN 19   CREATININE 1.6*   ALKPHOS 131   *   AST 61*   BILITOT 0.9       Significant Diagnostics:  I have reviewed all pertinent imaging results/findings within the past 24 hours.    Assessment/Plan:     Active Diagnoses:    Diagnosis Date Noted POA    PRINCIPAL PROBLEM:  Cholelithiasis with acute cholecystitis [K80.00] 03/02/2023 Yes    Type 2 diabetes mellitus without complication, without long-term current use of insulin [E11.9] 03/02/2023 Yes    Hypothyroidism [E03.9] 01/09/2019 Yes    Morbid obesity with BMI of 60.0-69.9, adult [E66.01, Z68.44] 10/10/2018 Not Applicable      Problems Resolved During this Admission:    Diagnosis Date Noted Date Resolved POA    Hard to intubate [T88.4XXA] 03/03/2023 03/05/2023 No     -s/p lap yesica Friday  -feeling a lot better. Tbili normal. Tentative plan for repeat ERCP tomorrow. Will defer to GI on need of procedure.     Tomás Mitchell III, MD  General Surgery  [facility]

## 2023-03-06 NOTE — PLAN OF CARE
03/06/23 0922   Discharge Reassessment   Assessment Type Discharge Planning Reassessment   Did the patient's condition or plan change since previous assessment? No   Discharge Plan discussed with: Patient;Spouse/sig other   Communicated JOSE ANTONIO with patient/caregiver Yes   Discharge Plan A Home with family   Discharge Plan B Home   DME Needed Upon Discharge  none   Discharge Barriers Identified None   Why the patient remains in the hospital Requires continued medical care   Post-Acute Status   Discharge Delays (!) Change in Medical Condition     Patient scheduled for repeat ERCP on today. No discharge needs noted at this time. Cm to follow

## 2023-03-06 NOTE — PLAN OF CARE
Problem: Adult Inpatient Plan of Care  Goal: Plan of Care Review  Outcome: Ongoing, Not Progressing  Goal: Patient-Specific Goal (Individualized)  Outcome: Ongoing, Not Progressing  Goal: Absence of Hospital-Acquired Illness or Injury  Outcome: Ongoing, Not Progressing  Goal: Optimal Comfort and Wellbeing  Outcome: Ongoing, Not Progressing  Goal: Readiness for Transition of Care  Outcome: Ongoing, Not Progressing     Problem: Bariatric Environmental Safety  Goal: Safety Maintained with Care  Outcome: Ongoing, Not Progressing     Problem: Skin Injury Risk Increased  Goal: Skin Health and Integrity  Outcome: Ongoing, Not Progressing     Problem: Impaired Wound Healing  Goal: Optimal Wound Healing  Outcome: Ongoing, Not Progressing     Problem: Pain Acute  Goal: Acceptable Pain Control and Functional Ability  Outcome: Ongoing, Not Progressing

## 2023-03-06 NOTE — PROGRESS NOTES
"[facility]  Adult Nutrition   Progress Note (Initial Assessment)     SUMMARY     Recommendations  Recommendation/Intervention:   1. Advance diet to Diabetic, 2000 kcal, [place] when appropriate.   2. Education for post-yesica completed, see note.    Goals: Patient diet to be advanced within 24-48 hrs.  Nutrition Goal Status: new    Dietitian Rounds Brief  48 yr old obese female s/p lap yesica Friday. RD provided patient with Lowfat (bland) diet education on Saturday. Patient has history of DM 2 with good control per labs.   Patient with no overt signs of malnutrition with stated good intake prior to hospitalization. [facility] 3/3/23.   Patient is off unit for repeat ERCP ; visual assessment completed 3/04/23. RD to follow for intake and diet progression, along with labs trending to target range.    Diet order:   Current Diet Order: NPO     Evaluation of Received Nutrient/Fluid Intake  Energy Calories Required: not meeting needs  Protein Required: not meeting needs  Fluid Required: not meeting needs  Tolerance: tolerating     % Intake of Estimated Energy Needs: 0%  % Meal Intake: NPO    Intake/Output Summary (Last 24 hours) at 3/6/2023 1328  Last data filed at 3/5/2023 1614  Gross per 24 hour   Intake 240 ml   Output --   Net 240 ml      Anthropometrics  Temp: 98.3 °F (36.8 °C)  Height Method: Stated  Height: 5' 7" (170.2 cm)  Height (inches): 67 in  Weight Method: Bed Scale  Weight: (!) 201.7 kg (444 lb 11.2 oz)  Weight (lb): (!) 444.71 lb  Ideal Body Weight (IBW), Female: 135 lb  % Ideal Body Weight, Female (lb): 317.04 %  BMI (Calculated): 69.6     Estimated/Assessed Needs  Weight Used For Calorie Calculations: (!) 201.7 kg (444 lb 10.7 oz)  Energy Calorie Requirements (kcal): 2342 kcal (MSJ X FACTOR - 1000 kcal ; 12 kcal/kg)  Energy Need Method: [place]  Protein Requirements:  gm/day (1.5-2.0 gm / kg IBW)  Weight Used For Protein Calculations: 61 kg (134 lb 7.7 oz)  Fluid Requirements (mL): " 0556-4302 (12-20 ml/kg) or per MD  Estimated Fluid Requirement Method: RDA Method  RDA Method (mL): 2342       Reason for Assessment  Reason For Assessment: consult (education)  Diagnosis: gastrointestinal disease  Relevant Medical History: DM 2, GERD  Interdisciplinary Rounds: did not attend    Nutrition/Diet History  Spiritual, Cultural Beliefs, Yazdanism Practices, Values that Affect Care: no  Food Allergies: NKFA  Factors Affecting Nutritional Intake: NPO    Nutrition Risk Screen  Nutrition Risk Screen: no indicators present       Altered Skin Integrity 03/02/23 1415 Left Foot-Wound Image: Images linked  MST Score: 0  Have you recently lost weight without trying?: No  Weight loss score: 0  Have you been eating poorly because of a decreased appetite?: No  Appetite score: 0       Weight History:  Wt Readings from Last 5 Encounters:   03/06/23 (!) 201.7 kg (444 lb 11.2 oz)   11/09/22 (!) 199.5 kg (439 lb 13.1 oz)   07/08/22 (!) 191 kg (421 lb 1.3 oz)   03/09/22 (!) 189.5 kg (417 lb 12.4 oz)   01/03/22 (!) 187.3 kg (412 lb 14.8 oz)        Lab/Procedures/Meds: Pertinent Labs/Meds Reviewed    Medications:Pertinent Medications Reviewed  Scheduled Meds:   EScitalopram oxalate  20 mg Oral Daily    levothyroxine  100 mcg Oral Before breakfast    lisinopriL  10 mg Oral Daily    pantoprazole  40 mg Intravenous BID AC    piperacillin-tazobactam (ZOSYN) IVPB  4.5 g Intravenous Q8H     Continuous Infusions:   lactated ringers 125 mL/hr at 03/05/23 0436     PRN Meds:.acetaminophen, dextrose 10%, dextrose 10%, glucagon (human recombinant), glucose, glucose, HYDROmorphone, insulin aspart U-100, melatonin, naloxone, ondansetron, polyethylene glycol, senna-docusate 8.6-50 mg, simethicone, sodium chloride 0.9%    Labs: Pertinent Labs Reviewed  Clinical Chemistry:  Recent Labs   Lab 03/01/23  1942 03/02/23  0927 03/03/23  0649 03/04/23  0713 03/05/23  0557 03/06/23  0458   *   < >  --  132*  --   --    K 4.9   < >  --  4.1  --    --    CL 99   < >  --  100  --   --    CO2 24   < >  --  22*  --   --    *   < >  --  162*  --   --    BUN 11   < >  --  19  --   --    CREATININE 0.8   < >  --  1.6*  --   --    CALCIUM 8.6*   < >  --  7.5*  --   --    PROT 7.3   < >  --  6.3  --   --    ALBUMIN 3.5   < >  --  2.8*  --   --    BILITOT 2.6*   < >  --  0.9  --   --    ALKPHOS 232*   < >  --  131  --   --    *   < >  --  61*  --   --    *   < >  --  137*  --   --    ANIONGAP 11   < >  --  10  --   --    MG 1.9   < >  --  1.9   < > 2.0   LIPASE 3597*  --  267* 54  --   --     < > = values in this interval not displayed.     CBC:   Recent Labs   Lab 03/06/23  0458   WBC 16.18*   RBC 3.50*   HGB 10.1*   HCT 31.5*      MCV 90   MCH 28.9   MCHC 32.1     Lipid Panel:  Recent Labs   Lab 03/02/23  0927   TRIG 46     Cardiac Profile:  Recent Labs   Lab 03/01/23  1942   BNP 83     Monitor and Evaluation  Food and Nutrient Intake: energy intake, food and beverage intake  Food and Nutrient Adminstration: diet order  Knowledge/Beliefs/Attitudes: food and nutrition knowledge/skill  Physical Activity and Function: nutrition-related ADLs and IADLs  Anthropometric Measurements: weight, weight change, body mass index  Biochemical Data, Medical Tests and Procedures: lipid profile, inflammatory profile, glucose/endocrine profile, electrolyte and renal panel  Nutrition-Focused Physical Findings: overall appearance     Nutrition Risk  Level of Risk/Frequency of Follow-up: moderate - high     Nutrition Follow-Up  RD Follow-up?: Yes      Niki Mitchell RD, LDN 03/06/2023 1:28 PM

## 2023-03-06 NOTE — CARE UPDATE
03/06/23 0735   PRE-TX-O2   Device (Oxygen Therapy) room air   SpO2 (!) 93 %   Pulse Oximetry Type Intermittent   $ Pulse Oximetry - Multiple Charge Pulse Oximetry - Multiple   Pulse 70   Resp 18   Preset CPAP/BiPAP Settings   Mode Of Delivery   (home unit)   Respiratory Evaluation   $ Care Plan Tech Time 15 min

## 2023-03-06 NOTE — ANESTHESIA PREPROCEDURE EVALUATION
03/06/2023  Lavinia Herrera is a 48 y.o., female.           Tobacco Use:  The patient  reports that she quit smoking about 24 years ago. She has never used smokeless tobacco.     Results for orders placed or performed during the hospital encounter of 03/01/23   EKG 12-lead    Collection Time: 03/01/23  5:55 PM    Narrative    Test Reason : R07.9,    Vent. Rate : 056 BPM     Atrial Rate : 056 BPM     P-R Int : 190 ms          QRS Dur : 086 ms      QT Int : 466 ms       P-R-T Axes : 061 007 045 degrees     QTc Int : 449 ms    Sinus bradycardia with sinus arrhythmia  Possible Left atrial enlargement  Borderline Abnormal ECG  When compared with ECG of 15-MAY-2014 11:42,  No significant change was found  Confirmed by Lazaro Russ MD ([id number]) on 3/2/2023 4:20:19 PM    Referred By: [email or web address]   SELF           Confirmed By:Lazaro Russ MD        Imaging Results          CTA Chest Non-Coronary (PE Studies) (Final result)  Result time 03/02/23 15:33:13    Final result by Torres Torres Jr., MD (03/02/23 15:33:13)                 Narrative:    CMS MANDATED QUALITY DATA-CT RADIATION-436    HISTORY: Elevated d-dimer.    FINDINGS: Thin axial imaging through the chest was performed with 100 mL of Omnipaque 350 IV contrast, with sagittal and coronal images, MIPS, and or 3D reconstructions performed. All CT exams at this facility use dose modulation, iterative reconstruction, and or weight based dosing when appropriate to reduce radiation dose to as low as reasonably achievable.    No CT evidence of acute pulmonary thromboembolism, although the secondary and tertiary order branch vessels are limited by timing of the contrast bolus and quantum noise morphology. No evidence of central obstructing embolic formation is noted. Three-vessel central mediastinum are unremarkable.    The pulmonary parenchymal lung was sinus  demonstrate no evidence of mass, infiltrate, or pleural effusion.    The cardiac chambers maintain normal size and overall configuration. No evidence of significant pericardial or pleural fluid.    Chronic degenerative spondylosis changes of the thoracic spine are noted.    The liver is fatty-replaced. No evidence of adrenal enlargement.    Patient is of large body habitus limiting diagnostic inspection.        IMPRESSION:  1. No convincing findings of acute pulmonary thromboembolism, although secondary and tertiary branch evaluation limited by timing of the contrast bolus and quantum noise morphology.  2. No acute intrathoracic process..    Electronically signed by:  Torres Torres MD  3/2/2023 3:33 PM CST Workstation: [id number]                             X-Ray Chest AP Portable (Final result)  Result time 03/02/23 08:57:34    Final result by Torres Torres Jr., MD (03/02/23 08:57:34)                 Narrative:    CHEST X-RAY SINGLE VIEW    HISTORY: Chest pain    FINDINGS:   A single view of the chest was performed without the benefit of previous comparison.  The heart size and pulmonary vascularity are within the range of normal.  There is no significant hilar nor mediastinal process.  The aerated lungs are well expanded and clear.  The right and left CP angles are rather sharp.  The osseous structures show nothing unusual.    IMPRESSION:   Negative chest.    Electronically signed by:  Torres Torres MD  3/2/2023 8:57 AM CST Workstation: [id number]                             US Abdomen Limited (Gallbladder) (Final result)  Result time 03/01/23 20:09:48    Final result by Milvia Villanueva MD (03/01/23 20:09:48)                 Narrative:    EXAM DESCRIPTION:  US ABDOMEN LIMITED    CLINICAL HISTORY:  48 years  Female;  pain    TECHNIQUE: Abdominal ultrasound was performed.    COMPARISON: None.    FINDINGS:  Pancreas: The body the pancreas appears slightly hyperechoic. The head and tail are not well  seen.  Liver: Liver is enlarged measuring 22.3 cm in length. There is increased echogenicity consistent with fatty infiltration. Portal vein is patent with hepatopedal flow. Overall the liver is not well seen..  Gallbladder: Gallstones are present in the gallbladder. The largest measures 3 cm. Positive sonographic Chaudhary's. Gallbladder wall is 1.2 mm. No pericholecystic fluid  Common bile duct: 4.7 mm.  Right kidney: The kidney measures 13.0 x 5.1 x 5.6 cm. Echogenicity and blood flow appear normal.. No hydronephrosis.  Aorta:Visualized portions are within normal limits.  IVC: Visualized portions are within normal limits.  Ascites:  No free fluid.    IMPRESSION:  1.  Cholelithiasis with positive sonographic Chaudhary's. No gallbladder wall thickening or pericholecystic fluid.  2.  Hepatomegaly with fatty infiltration of the liver.    Electronically signed by:  Milvia Villanueva MD  3/1/2023 8:09 PM [employer] Workstation: [id number]                               Lab Results   Component Value Date    WBC 16.18 (H) 03/06/2023    HGB 10.1 (L) 03/06/2023    HCT 31.5 (L) 03/06/2023    MCV 90 03/06/2023     03/06/2023     BMP  Lab Results   Component Value Date     (L) 03/04/2023    K 4.1 03/04/2023     03/04/2023    CO2 22 (L) 03/04/2023    BUN 19 03/04/2023    CREATININE 1.6 (H) 03/04/2023    CALCIUM 7.5 (L) 03/04/2023    ANIONGAP 10 03/04/2023     (H) 03/04/2023     (H) 03/03/2023     (H) 03/02/2023       No results found for this or any previous visit.            Pre-op Assessment    I have reviewed the Patient Summary Reports.     I have reviewed the Nursing Notes. I have reviewed the NPO Status.   I have reviewed the Medications.     Review of Systems  Anesthesia Hx:  No problems with previous Anesthesia  Denies Family Hx of Anesthesia complications.   Denies Personal Hx of Anesthesia complications.   Social:  Former Smoker, Alcohol Use    Hematology/Oncology:         -- Anemia:    Cardiovascular:   Hypertension ECG has been reviewed.    Pulmonary:   Sleep Apnea suspected   Education provided regarding risk of obstructive sleep apnea     Hepatic/GI:   GERD, well controlled    Musculoskeletal:  Musculoskeletal Normal    Neurological:  Neurology Normal    Endocrine:   Diabetes, poorly controlled, type 2 Hypothyroidism  Morbid Obesity / BMI > 40  Dermatological:  Skin Normal    Psych:  Psychiatric Normal           Physical Exam  General: Well nourished, Cooperative, Alert and Oriented    Airway:  Mallampati: II   Mouth Opening: Normal  TM Distance: Normal  Tongue: Normal  Neck ROM: Normal ROM    Dental:  Intact    Chest/Lungs:  Clear to auscultation    Heart:  Rate: Normal  Rhythm: Regular Rhythm  Sounds: Normal        Anesthesia Plan  Type of Anesthesia, risks & benefits discussed:    Anesthesia Type: Gen ETT  Intra-op Monitoring Plan: Standard ASA Monitors  Post Op Pain Control Plan: multimodal analgesia  Induction:  IV  Airway Plan: Video and Direct  Informed Consent: Informed consent signed with the Patient and all parties understand the risks and agree with anesthesia plan.  All questions answered.   ASA Score: 3  Anesthesia Plan Notes:   ++Chart Pre Op Only++  GETA  No Decadron   LUCAS precautions  Multimodal with ketamine -- NO ofirmev  sugammadex reversal      Ready For Surgery From Anesthesia Perspective.     .

## 2023-03-07 LAB
ALBUMIN SERPL BCP-MCNC: 2.6 G/DL (ref 3.5–5.2)
ALP SERPL-CCNC: 101 U/L (ref 55–135)
ALT SERPL W/O P-5'-P-CCNC: 41 U/L (ref 10–44)
ANION GAP SERPL CALC-SCNC: 7 MMOL/L (ref 8–16)
AST SERPL-CCNC: 18 U/L (ref 10–40)
BASOPHILS # BLD AUTO: 0.08 K/UL (ref 0–0.2)
BASOPHILS NFR BLD: 0.5 % (ref 0–1.9)
BILIRUB SERPL-MCNC: 0.7 MG/DL (ref 0.1–1)
BUN SERPL-MCNC: 14 MG/DL (ref 6–20)
CALCIUM SERPL-MCNC: 8.5 MG/DL (ref 8.7–10.5)
CHLORIDE SERPL-SCNC: 104 MMOL/L (ref 95–110)
CO2 SERPL-SCNC: 25 MMOL/L (ref 23–29)
CREAT SERPL-MCNC: 1.1 MG/DL (ref 0.5–1.4)
DIFFERENTIAL METHOD: ABNORMAL
EOSINOPHIL # BLD AUTO: 0.3 K/UL (ref 0–0.5)
EOSINOPHIL NFR BLD: 2.1 % (ref 0–8)
ERYTHROCYTE [DISTWIDTH] IN BLOOD BY AUTOMATED COUNT: 15.7 % (ref 11.5–14.5)
EST. GFR  (NO RACE VARIABLE): >60 ML/MIN/1.73 M^2
GLUCOSE SERPL-MCNC: 158 MG/DL (ref 70–110)
GLUCOSE SERPL-MCNC: 160 MG/DL (ref 70–110)
GLUCOSE SERPL-MCNC: 163 MG/DL (ref 70–110)
GLUCOSE SERPL-MCNC: 163 MG/DL (ref 70–110)
GLUCOSE SERPL-MCNC: 167 MG/DL (ref 70–110)
GLUCOSE SERPL-MCNC: 203 MG/DL (ref 70–110)
HCT VFR BLD AUTO: 34.2 % (ref 37–48.5)
HGB BLD-MCNC: 10.7 G/DL (ref 12–16)
IMM GRANULOCYTES # BLD AUTO: 0.53 K/UL (ref 0–0.04)
IMM GRANULOCYTES NFR BLD AUTO: 3.3 % (ref 0–0.5)
LYMPHOCYTES # BLD AUTO: 1.4 K/UL (ref 1–4.8)
LYMPHOCYTES NFR BLD: 8.8 % (ref 18–48)
MAGNESIUM SERPL-MCNC: 2 MG/DL (ref 1.6–2.6)
MCH RBC QN AUTO: 28.6 PG (ref 27–31)
MCHC RBC AUTO-ENTMCNC: 31.3 G/DL (ref 32–36)
MCV RBC AUTO: 91 FL (ref 82–98)
MONOCYTES # BLD AUTO: 1.7 K/UL (ref 0.3–1)
MONOCYTES NFR BLD: 10.3 % (ref 4–15)
NEUTROPHILS # BLD AUTO: 12.1 K/UL (ref 1.8–7.7)
NEUTROPHILS NFR BLD: 75 % (ref 38–73)
NRBC BLD-RTO: 0 /100 WBC
PLATELET # BLD AUTO: 311 K/UL (ref 150–450)
PLATELET BLD QL SMEAR: ABNORMAL
PMV BLD AUTO: 10.4 FL (ref 9.2–12.9)
POTASSIUM SERPL-SCNC: 3.9 MMOL/L (ref 3.5–5.1)
PROT SERPL-MCNC: 6.2 G/DL (ref 6–8.4)
RBC # BLD AUTO: 3.74 M/UL (ref 4–5.4)
SODIUM SERPL-SCNC: 136 MMOL/L (ref 136–145)
WBC # BLD AUTO: 16.07 K/UL (ref 3.9–12.7)

## 2023-03-07 PROCEDURE — D9220A PRA ANESTHESIA: Mod: ANES,,, | Performed by: ANESTHESIOLOGY

## 2023-03-07 PROCEDURE — 63600175 PHARM REV CODE 636 W HCPCS: Performed by: HOSPITALIST

## 2023-03-07 PROCEDURE — D9220A PRA ANESTHESIA: ICD-10-PCS | Mod: CRNA,,, | Performed by: NURSE ANESTHETIST, CERTIFIED REGISTERED

## 2023-03-07 PROCEDURE — 63600175 PHARM REV CODE 636 W HCPCS: Performed by: NURSE ANESTHETIST, CERTIFIED REGISTERED

## 2023-03-07 PROCEDURE — 43262 ENDO CHOLANGIOPANCREATOGRAPH: CPT | Performed by: INTERNAL MEDICINE

## 2023-03-07 PROCEDURE — 83735 ASSAY OF MAGNESIUM: CPT | Performed by: SURGERY

## 2023-03-07 PROCEDURE — 37000009 HC ANESTHESIA EA ADD 15 MINS: Performed by: INTERNAL MEDICINE

## 2023-03-07 PROCEDURE — C9113 INJ PANTOPRAZOLE SODIUM, VIA: HCPCS | Performed by: SURGERY

## 2023-03-07 PROCEDURE — 25000003 PHARM REV CODE 250: Performed by: SURGERY

## 2023-03-07 PROCEDURE — 27201107 HC STYLET, STANDARD: Performed by: ANESTHESIOLOGY

## 2023-03-07 PROCEDURE — 94761 N-INVAS EAR/PLS OXIMETRY MLT: CPT

## 2023-03-07 PROCEDURE — 82962 GLUCOSE BLOOD TEST: CPT | Performed by: INTERNAL MEDICINE

## 2023-03-07 PROCEDURE — 27202125 HC BALLOON, EXTRACTION (ANY): Performed by: INTERNAL MEDICINE

## 2023-03-07 PROCEDURE — 43264 ERCP REMOVE DUCT CALCULI: CPT | Performed by: INTERNAL MEDICINE

## 2023-03-07 PROCEDURE — 36415 COLL VENOUS BLD VENIPUNCTURE: CPT | Performed by: SURGERY

## 2023-03-07 PROCEDURE — 25500020 PHARM REV CODE 255: Performed by: INTERNAL MEDICINE

## 2023-03-07 PROCEDURE — 99900035 HC TECH TIME PER 15 MIN (STAT)

## 2023-03-07 PROCEDURE — 82962 GLUCOSE BLOOD TEST: CPT

## 2023-03-07 PROCEDURE — 27201089 HC SNARE, DISP (ANY): Performed by: INTERNAL MEDICINE

## 2023-03-07 PROCEDURE — C1769 GUIDE WIRE: HCPCS | Performed by: INTERNAL MEDICINE

## 2023-03-07 PROCEDURE — 25000003 PHARM REV CODE 250: Performed by: NURSE ANESTHETIST, CERTIFIED REGISTERED

## 2023-03-07 PROCEDURE — D9220A PRA ANESTHESIA: Mod: CRNA,,, | Performed by: NURSE ANESTHETIST, CERTIFIED REGISTERED

## 2023-03-07 PROCEDURE — 63600175 PHARM REV CODE 636 W HCPCS: Performed by: SURGERY

## 2023-03-07 PROCEDURE — 12000002 HC ACUTE/MED SURGE SEMI-PRIVATE ROOM

## 2023-03-07 PROCEDURE — 37000008 HC ANESTHESIA 1ST 15 MINUTES: Performed by: INTERNAL MEDICINE

## 2023-03-07 PROCEDURE — D9220A PRA ANESTHESIA: ICD-10-PCS | Mod: ANES,,, | Performed by: ANESTHESIOLOGY

## 2023-03-07 PROCEDURE — 43275 ERCP REMOVE FORGN BODY DUCT: CPT | Performed by: INTERNAL MEDICINE

## 2023-03-07 PROCEDURE — 27000673 HC TUBING BLOOD Y: Performed by: ANESTHESIOLOGY

## 2023-03-07 PROCEDURE — 80053 COMPREHEN METABOLIC PANEL: CPT | Performed by: SURGERY

## 2023-03-07 PROCEDURE — 99900031 HC PATIENT EDUCATION (STAT)

## 2023-03-07 PROCEDURE — 85025 COMPLETE CBC W/AUTO DIFF WBC: CPT | Performed by: SURGERY

## 2023-03-07 PROCEDURE — 25000003 PHARM REV CODE 250: Performed by: INTERNAL MEDICINE

## 2023-03-07 RX ORDER — SUCCINYLCHOLINE CHLORIDE 20 MG/ML
INJECTION INTRAMUSCULAR; INTRAVENOUS
Status: DISCONTINUED | OUTPATIENT
Start: 2023-03-07 | End: 2023-03-07

## 2023-03-07 RX ORDER — DIPHENHYDRAMINE HYDROCHLORIDE 50 MG/ML
12.5 INJECTION INTRAMUSCULAR; INTRAVENOUS
Status: CANCELLED | OUTPATIENT
Start: 2023-03-07

## 2023-03-07 RX ORDER — ROCURONIUM BROMIDE 10 MG/ML
INJECTION, SOLUTION INTRAVENOUS
Status: DISCONTINUED | OUTPATIENT
Start: 2023-03-07 | End: 2023-03-07

## 2023-03-07 RX ORDER — OXYCODONE HYDROCHLORIDE 5 MG/1
5 TABLET ORAL
Status: CANCELLED | OUTPATIENT
Start: 2023-03-07

## 2023-03-07 RX ORDER — LIDOCAINE HYDROCHLORIDE 20 MG/ML
INJECTION, SOLUTION EPIDURAL; INFILTRATION; INTRACAUDAL; PERINEURAL
Status: DISCONTINUED | OUTPATIENT
Start: 2023-03-07 | End: 2023-03-07

## 2023-03-07 RX ORDER — PROPOFOL 10 MG/ML
VIAL (ML) INTRAVENOUS
Status: DISCONTINUED | OUTPATIENT
Start: 2023-03-07 | End: 2023-03-07

## 2023-03-07 RX ORDER — FAMOTIDINE 10 MG/ML
INJECTION INTRAVENOUS
Status: DISCONTINUED | OUTPATIENT
Start: 2023-03-07 | End: 2023-03-07

## 2023-03-07 RX ORDER — ONDANSETRON HYDROCHLORIDE 2 MG/ML
INJECTION, SOLUTION INTRAMUSCULAR; INTRAVENOUS
Status: DISCONTINUED | OUTPATIENT
Start: 2023-03-07 | End: 2023-03-07

## 2023-03-07 RX ORDER — INDOMETHACIN 50 MG/1
SUPPOSITORY RECTAL
Status: COMPLETED | OUTPATIENT
Start: 2023-03-07 | End: 2023-03-07

## 2023-03-07 RX ORDER — ONDANSETRON 2 MG/ML
4 INJECTION INTRAMUSCULAR; INTRAVENOUS DAILY PRN
Status: CANCELLED | OUTPATIENT
Start: 2023-03-07

## 2023-03-07 RX ORDER — FENTANYL CITRATE 50 UG/ML
25 INJECTION, SOLUTION INTRAMUSCULAR; INTRAVENOUS EVERY 5 MIN PRN
Status: CANCELLED | OUTPATIENT
Start: 2023-03-07

## 2023-03-07 RX ADMIN — LISINOPRIL 10 MG: 2.5 TABLET ORAL at 09:03

## 2023-03-07 RX ADMIN — HYDROMORPHONE HYDROCHLORIDE 1 MG: 1 INJECTION, SOLUTION INTRAMUSCULAR; INTRAVENOUS; SUBCUTANEOUS at 09:03

## 2023-03-07 RX ADMIN — SODIUM CHLORIDE, SODIUM LACTATE, POTASSIUM CHLORIDE, AND CALCIUM CHLORIDE: .6; .31; .03; .02 INJECTION, SOLUTION INTRAVENOUS at 06:03

## 2023-03-07 RX ADMIN — PIPERACILLIN AND TAZOBACTAM 4.5 G: 4; .5 INJECTION, POWDER, LYOPHILIZED, FOR SOLUTION INTRAVENOUS; PARENTERAL at 10:03

## 2023-03-07 RX ADMIN — ONDANSETRON 8 MG: 2 INJECTION INTRAMUSCULAR; INTRAVENOUS at 02:03

## 2023-03-07 RX ADMIN — Medication 6 MG: at 11:03

## 2023-03-07 RX ADMIN — ROCURONIUM BROMIDE 5 MG: 10 INJECTION, SOLUTION INTRAVENOUS at 02:03

## 2023-03-07 RX ADMIN — PROPOFOL 150 MG: 10 INJECTION, EMULSION INTRAVENOUS at 02:03

## 2023-03-07 RX ADMIN — PIPERACILLIN AND TAZOBACTAM 4.5 G: 4; .5 INJECTION, POWDER, LYOPHILIZED, FOR SOLUTION INTRAVENOUS; PARENTERAL at 09:03

## 2023-03-07 RX ADMIN — ROCURONIUM BROMIDE 15 MG: 10 INJECTION, SOLUTION INTRAVENOUS at 02:03

## 2023-03-07 RX ADMIN — SUGAMMADEX 200 MG: 100 INJECTION, SOLUTION INTRAVENOUS at 03:03

## 2023-03-07 RX ADMIN — ESCITALOPRAM OXALATE 20 MG: 10 TABLET ORAL at 09:03

## 2023-03-07 RX ADMIN — HYDROMORPHONE HYDROCHLORIDE 1 MG: 1 INJECTION, SOLUTION INTRAMUSCULAR; INTRAVENOUS; SUBCUTANEOUS at 05:03

## 2023-03-07 RX ADMIN — INSULIN ASPART 1 UNITS: 100 INJECTION, SOLUTION INTRAVENOUS; SUBCUTANEOUS at 09:03

## 2023-03-07 RX ADMIN — FAMOTIDINE 20 MG: 10 INJECTION, SOLUTION INTRAVENOUS at 02:03

## 2023-03-07 RX ADMIN — LIDOCAINE HYDROCHLORIDE 50 MG: 20 INJECTION, SOLUTION EPIDURAL; INFILTRATION; INTRACAUDAL; PERINEURAL at 02:03

## 2023-03-07 RX ADMIN — Medication 200 MG: at 02:03

## 2023-03-07 RX ADMIN — PANTOPRAZOLE SODIUM 40 MG: 40 INJECTION, POWDER, LYOPHILIZED, FOR SOLUTION INTRAVENOUS at 06:03

## 2023-03-07 RX ADMIN — HYDROMORPHONE HYDROCHLORIDE 1 MG: 1 INJECTION, SOLUTION INTRAMUSCULAR; INTRAVENOUS; SUBCUTANEOUS at 01:03

## 2023-03-07 RX ADMIN — SODIUM CHLORIDE, SODIUM LACTATE, POTASSIUM CHLORIDE, AND CALCIUM CHLORIDE: .6; .31; .03; .02 INJECTION, SOLUTION INTRAVENOUS at 02:03

## 2023-03-07 NOTE — [employer] PATIENT INSTRUCTIONS
Discharge Summary/Instructions after an Endoscopic Procedure  Patient Name: Lavinia Herrera  Patient MRN: [id number]  Patient YOB: 1975 Tuesday, March 7, 2023  Dc Riddle III, MD  RESTRICTIONS:  During your procedure today, you received medications for sedation.  These   medications may affect your judgment, balance and coordination.  Therefore,   for 24 hours, you have the following restrictions:   - DO NOT drive a car, operate machinery, make legal/financial decisions,   sign important papers or drink alcohol.    ACTIVITY:  Today: no heavy lifting, straining or running due to procedural   sedation/anesthesia.  The following day: return to full activity including work.  DIET:  Eat and drink normally unless instructed otherwise.     TREATMENT FOR COMMON SIDE EFFECTS:  - Mild abdominal pain, nausea, belching, bloating or excessive gas:  rest,   eat lightly and use a heating pad.  - Sore Throat: treat with throat lozenges and/or gargle with warm salt   water.  - Because air was used during the procedure, expelling large amounts of air   from your rectum or belching is normal.  - If a bowel prep was taken, you may not have a bowel movement for 1-3 days.    This is normal.  SYMPTOMS TO WATCH FOR AND REPORT TO YOUR PHYSICIAN:  1. Abdominal pain or bloating, other than gas cramps.  2. Chest pain.  3. Back pain.  4. Signs of infection such as: chills or fever occurring within 24 hours   after the procedure.  5. Rectal bleeding, which would show as bright red, maroon, or black stools.   (A tablespoon of blood from the rectum is not serious, especially if   hemorrhoids are present.)  6. Vomiting.  7. Weakness or dizziness.  GO DIRECTLY TO THE NEAREST EMERGENCY ROOM IF YOU HAVE ANY OF THE FOLLOWING:      Difficulty breathing              Chills and/or fever over 101 F   Persistent vomiting and/or vomiting blood   Severe abdominal pain   Severe chest pain   Black, tarry stools   Bleeding- more than one  tablespoon   Any other symptom or condition that you feel may need urgent attention  Your doctor recommends these additional instructions:  If any biopsies were taken, your doctors clinic will contact you in 1 to 2   weeks with any results.  - Continue present medications. D/w'd  to consider bariatric surgery  - Patient has a contact number available for emergencies.  The signs and   symptoms of potential delayed complications were discussed with the   patient.  Return to normal activities tomorrow.  Written discharge   instructions were provided to the patient.   - Return patient to hospital [facility] for ongoing care.  For questions, problems or results please call your physician - Dc Riddle III, MD at Work:  [phone].  [facility], EMERGENCY ROOM PHONE NUMBER: [phone]  IF A COMPLICATION OR EMERGENCY SITUATION ARISES AND YOU ARE UNABLE TO REACH   YOUR PHYSICIAN - GO DIRECTLY TO THE EMERGENCY ROOM.  Dc Riddle III, MD  3/7/2023 3:39:47 PM  This report has been verified and signed electronically.  Dear patient,  As a result of recent federal legislation (The Federal Cures Act), you may   receive lab or pathology results from your procedure in your [facility]   account before your physician is able to contact you. Your physician or   their representative will relay the results to you with their   recommendations at their soonest availability.  Thank you,  [employer]

## 2023-03-07 NOTE — NURSING
0700: report received, no distress    1345: off floor to endo  1700: back from endo, feels much better

## 2023-03-07 NOTE — ASSESSMENT & PLAN NOTE
GI and surgeon following.  ERCP attempted and failed.  A stent was deployed in the ventral pancreatic duct but none in the CBD (due to edema).  A lap yesica was performed.  Tomorrow, another attempt at biliary stent will be done.  In the meantime, provide analgesia.

## 2023-03-07 NOTE — CONSULTS
6x3.2x0.1cm left foot wound pink with yellow fibrious tissue no drainage no odor, periwound dry peeling.  Cleaned and dried and applied Medihoney, covered with Aquacel ag, covered with Medihoney.

## 2023-03-07 NOTE — PLAN OF CARE
03/07/23 0830   Discharge Reassessment   Assessment Type Discharge Planning Reassessment   Did the patient's condition or plan change since previous assessment? No   Discharge Plan discussed with: Patient;Spouse/sig other   Communicated JOSE ANTONIO with patient/caregiver Yes   Discharge Plan A Home with family   Discharge Plan B Home   DME Needed Upon Discharge  none   Discharge Barriers Identified None   Why the patient remains in the hospital Requires continued medical care   Post-Acute Status   Discharge Delays (!) Procedure Scheduling (IR, OR, Labs, Echo, Cath, Echo, EEG)     ERCP re-scheduled for today. Case management following for any discharge needs- None known at this time. CM to follow

## 2023-03-07 NOTE — CARE UPDATE
03/07/23 0729   PRE-TX-O2   Device (Oxygen Therapy) room air   SpO2 95 %   Pulse Oximetry Type Intermittent   $ Pulse Oximetry - Multiple Charge Pulse Oximetry - Multiple   Pulse 60   Resp 18   Preset CPAP/BiPAP Settings   Mode Of Delivery   (own unit)   Education   $ Education Bronchodilator;15 min   Respiratory Evaluation   $ Care Plan Tech Time 15 min

## 2023-03-07 NOTE — TRANSFER OF CARE
"Anesthesia Transfer of Care Note    Patient: Lavinia Herrera    Procedure(s) Performed: Procedure(s) (LRB):  ERCP (ENDOSCOPIC RETROGRADE CHOLANGIOPANCREATOGRAPHY) (N/A)    Patient location: PACU    Anesthesia Type: general    Transport from OR: Transported from OR on room air with adequate spontaneous ventilation    Post pain: adequate analgesia    Post assessment: no apparent anesthetic complications and tolerated procedure well    Post vital signs: stable    Level of consciousness: awake    Nausea/Vomiting: no nausea/vomiting    Complications: none    Transfer of care protocol was followed      Last vitals:   Visit Vitals  BP (!) 141/78 (BP Location: Right arm, Patient Position: Lying)   Pulse 64   Temp 36.7 °C (98 °F) (Temporal)   Resp 20   Ht 5' 7" (1.702 m)   Wt (!) 201.7 kg (444 lb 11.2 oz)   LMP 02/28/2023   SpO2 95%   Breastfeeding No   BMI 69.65 kg/m²     "

## 2023-03-07 NOTE — PROGRESS NOTES
[facility] Medicine  Progress Note    Patient Name: Lavinia Herrera  MRN: [id number]  Patient Class: IP- Inpatient   Admission Date: 3/1/2023  Length of Stay: 3 days  Attending Physician: Franklin Shafer MD  Primary Care Provider: Oniel Malik MD        Subjective:     Principal Problem:Cholelithiasis with acute cholecystitis        HPI:  Patient is a 48-year-old female, who presents emergency room complaining of abdominal pain.  Patient states that she is had abdominal pain for 2 days worsening without relieving factors.  She denies any nausea or vomiting.  No fevers chills.  Her past medical significant for hypothyroidism, diabetes mellitus and elevated blood pressure.  Patient denies any chest pains.  No shortness breath.    Plan to place patient in observation.  Consult general surgery.  Obtain an MRCP.  Continue IV antibiotics.  Plan per surgery    Elevated transaminase and elevated bilirubin most likely caused from cholelithiasis.  Elevated blood pressure probably related to pain discomfort will add ACE inhibitor for renal protection for diabetes and blood pressure control      Overview/Hospital Course:  No notes on file    Interval History:  Due to scheduling conflicts, ERCP cannot be done today.  Pt continues with abdominal pain.    Review of Systems   Constitutional:  Negative for chills and fever.   Respiratory:  Negative for cough and shortness of breath.    Cardiovascular:  Negative for chest pain and leg swelling.   Gastrointestinal:  Positive for abdominal pain. Negative for nausea and vomiting.   Objective:     Vital Signs (Most Recent):  Temp: 98.3 °F (36.8 °C) (03/06/23 1931)  Pulse: 64 (03/06/23 1931)  Resp: 18 (03/06/23 1931)  BP: (!) 184/87 (03/06/23 1931)  SpO2: (!) 94 % (03/06/23 1931)   Vital Signs (24h Range):  Temp:  [97.8 °F (36.6 °C)-98.9 °F (37.2 °C)] 98.3 °F (36.8 °C)  Pulse:  [64-70] 64  Resp:  [16-20] 18  SpO2:  [90 %-98 %] 94 %  BP: (139-184)/(71-92)  184/87     Weight: (!) 201.7 kg (444 lb 11.2 oz)  Body mass index is 69.65 kg/m².  No intake or output data in the 24 hours ending 03/06/23 1948     Physical Exam  Constitutional:       General: She is not in acute distress.     Appearance: She is not ill-appearing.   Eyes:      General:         Right eye: No discharge.         Left eye: No discharge.   Neck:      Vascular: No JVD.   Cardiovascular:      Rate and Rhythm: Normal rate and regular rhythm.   Pulmonary:      Effort: Pulmonary effort is normal.      Breath sounds: Normal breath sounds.   Abdominal:      General: Abdomen is flat. Bowel sounds are normal. There is no distension.      Palpations: Abdomen is soft.      Tenderness: There is abdominal tenderness. There is no rebound.   Musculoskeletal:      Right lower leg: No edema.      Left lower leg: No edema.   Skin:     General: Skin is warm and moist.      Findings: No rash.   Neurological:      Mental Status: She is alert and oriented to person, place, and time.   Psychiatric:         Attention and Perception: Attention normal.         Mood and Affect: Mood and affect normal.         Speech: Speech normal.       Significant Labs: All pertinent labs within the past 24 hours have been reviewed.    Significant Imaging: No new imaging      Assessment/Plan:      * Cholelithiasis with acute cholecystitis  GI and surgeon following.  ERCP attempted and failed.  A stent was deployed in the ventral pancreatic duct but none in the CBD (due to edema).  A lap yesica was performed.  Tomorrow, another attempt at biliary stent will be done.  In the meantime, provide analgesia.    Type 2 diabetes mellitus without complication, without long-term current use of insulin  Patient's FSGs are controlled on current medication regimen.  Last A1c reviewed-   Lab Results   Component Value Date    HGBA1C 7.1 (H) 11/02/2022       Current correctional scale  Medium  Increase anti-hyperglycemic dose as follows-   Antihyperglycemics  (From admission, onward)    Start     Stop Route Frequency Ordered    03/02/23 0410  insulin aspart U-100 pen 1-10 Units         -- SubQ Before meals & nightly PRN 03/02/23 0410        Hold Oral hypoglycemics while patient is in the hospital.    Hypothyroidism  Chronic problem. Will continue chronic medications and monitor for any changes, adjusting as needed.            Morbid obesity with BMI of 60.0-69.9, adult  Body mass index is 67.03 kg/m². Morbid obesity complicates all aspects of disease management from diagnostic modalities to treatment. Weight loss encouraged and health benefits explained to patient.           VTE Risk Mitigation (From admission, onward)         Ordered     IP VTE HIGH RISK PATIENT  Once         03/02/23 0410     Place sequential compression device  Until discontinued         03/02/23 0410                Discharge Planning   JOSE ANTONIO: 3/7/2023     Code Status: Full Code   Is the patient medically ready for discharge?:     Reason for patient still in hospital (select all that apply): Patient trending condition, Treatment and Consult recommendations  Discharge Plan A: Home with family   Discharge Delays: (!) Change in Medical Condition              Franklin Shafer MD  Department of Hospital Medicine   [facility]

## 2023-03-07 NOTE — PLAN OF CARE
Problem: Adult Inpatient Plan of Care  Goal: Plan of Care Review  Outcome: Ongoing, Progressing  Goal: Patient-Specific Goal (Individualized)  Outcome: Ongoing, Progressing  Goal: Absence of Hospital-Acquired Illness or Injury  Outcome: Ongoing, Progressing  Goal: Optimal Comfort and Wellbeing  Outcome: Ongoing, Progressing  Goal: Readiness for Transition of Care  Outcome: Ongoing, Progressing     Problem: Bariatric Environmental Safety  Goal: Safety Maintained with Care  Outcome: Ongoing, Progressing     Problem: Skin Injury Risk Increased  Goal: Skin Health and Integrity  Outcome: Ongoing, Progressing     Problem: Diabetes Comorbidity  Goal: Blood Glucose Level Within Targeted Range  Outcome: Ongoing, Progressing     Problem: Impaired Wound Healing  Goal: Optimal Wound Healing  Outcome: Ongoing, Progressing     Problem: Pain Acute  Goal: Acceptable Pain Control and Functional Ability  Outcome: Ongoing, Progressing     Problem: Fall Injury Risk  Goal: Absence of Fall and Fall-Related Injury  Outcome: Ongoing, Progressing

## 2023-03-07 NOTE — SUBJECTIVE & OBJECTIVE
Interval History:  Due to scheduling conflicts, ERCP cannot be done today.  Pt continues with abdominal pain.    Review of Systems   Constitutional:  Negative for chills and fever.   Respiratory:  Negative for cough and shortness of breath.    Cardiovascular:  Negative for chest pain and leg swelling.   Gastrointestinal:  Positive for abdominal pain. Negative for nausea and vomiting.   Objective:     Vital Signs (Most Recent):  Temp: 98.3 °F (36.8 °C) (03/06/23 1931)  Pulse: 64 (03/06/23 1931)  Resp: 18 (03/06/23 1931)  BP: (!) 184/87 (03/06/23 1931)  SpO2: (!) 94 % (03/06/23 1931)   Vital Signs (24h Range):  Temp:  [97.8 °F (36.6 °C)-98.9 °F (37.2 °C)] 98.3 °F (36.8 °C)  Pulse:  [64-70] 64  Resp:  [16-20] 18  SpO2:  [90 %-98 %] 94 %  BP: (139-184)/(71-92) 184/87     Weight: (!) 201.7 kg (444 lb 11.2 oz)  Body mass index is 69.65 kg/m².  No intake or output data in the 24 hours ending 03/06/23 1948     Physical Exam  Constitutional:       General: She is not in acute distress.     Appearance: She is not ill-appearing.   Eyes:      General:         Right eye: No discharge.         Left eye: No discharge.   Neck:      Vascular: No JVD.   Cardiovascular:      Rate and Rhythm: Normal rate and regular rhythm.   Pulmonary:      Effort: Pulmonary effort is normal.      Breath sounds: Normal breath sounds.   Abdominal:      General: Abdomen is flat. Bowel sounds are normal. There is no distension.      Palpations: Abdomen is soft.      Tenderness: There is abdominal tenderness. There is no rebound.   Musculoskeletal:      Right lower leg: No edema.      Left lower leg: No edema.   Skin:     General: Skin is warm and moist.      Findings: No rash.   Neurological:      Mental Status: She is alert and oriented to person, place, and time.   Psychiatric:         Attention and Perception: Attention normal.         Mood and Affect: Mood and affect normal.         Speech: Speech normal.       Significant Labs: All pertinent labs  within the past 24 hours have been reviewed.    Significant Imaging: No new imaging

## 2023-03-07 NOTE — OP NOTE
DATE OF PROCEDURE: 03/07/2023    PREOPERATIVE DIAGNOSIS: 1. Acute calculous cholecystitis with biliary obstruction  2. Morbid obesity     POSTOPERATIVE DIAGNOSIS: Same    PROCEDURE: Laparoscopic cholecystectomy    SURGEON: Cade Elkins M.D    ASSISTANT: Tomás Mitchell MD    ANESTHESIA: General endotracheal    ESTIMATED BLOOD LOSS: Minimal    SPECIMEN: Gallbladder    CONDITION: Stable    COMPLICATIONS: Veress needle injury to the anterior gastric wall, no significant bleeding, leakage or significant injury was present.    FINDINGS:   1. Very inflamed gallbladder with an impacted stone at the infundibulum  2. Critical view of safety achieved  3. Mild spillage of bile but no stones  4. Inflammatory fluid present in the abdomen likely from pancreatitis     INDICATIONS: The patient is a 48 y.o. female admitted with severe epigastric and right upper quadrant abdominal pain.  Also found to have elevated LFTs.  Imaging was consistent with gallstones with acute cholecystitis.  Due to patient's obesity she was unable to undergo MRCP to confirm biliary obstruction.  Following days her LFTs increased making biliary obstruction more likely. She was counseled on options and agreed proceed with ERCP followed by laparoscopic cholecystectomy.      PROCEDURE IN DETAIL:   Prior to the laparoscopic cholecystectomy the patient was brought to the operating room placed in supine position where general endotracheal intubation was achieved. She was on scheduled antibiotics. She underwent ERCP however the biliary system was unable to be cannulated and this was eventually aborted. Surgical portion of the procedures then began.  Francis catheter was placed to decompress the bladder.  The abdomen was prepped and draped in typical sterile fashion.  Time-out performed by members of the operative team. A Veress needle was introduced into the abdominal cavity and insufflation was attached. We had inappropriately high pressures and we attempted  reinserted the Veress needle but again had inappropriately high pressures. This point I elected to abort the Veress insertion and planned an Optiview entry.  Local anesthetic was injected and a stab incision was made in the right upper mid abdomen. 5 mm Optiview trocar was placed under direct visualization into the abdominal cavity. We attached insufflation and achieved pneumoperitoneum. I inspected our Veress insertion site and I had a needle stick injury to the anterior wall of the stomach. There was no significant dilation or dissection from insufflation. There was no significant bleeding and no leakage of bile. This was read observed at the end of the case and again had no significant bleeding or leakage of bile or gastric contents therefore the small needle injury was left alone. Was able then to place additional trocars after injecting local anesthetic.  5 mm trocar was placed in the umbilical location and in the right upper lateral abdomen.  An 11 mm trocar was placed at the subxiphoid location. Patient was then placed in reverse Trendelenburg position the this was limited due the patient's body habitus. The gallbladder was identified and found to have a thick inflammatory rind.  There was also inflammatory fluid throughout the upper abdomen which was likely related to pancreatitis. There was no bile stained fluid.  The fundus was able to be grasped and retracted cephalad while the infundibulum was attempted to be pulled laterally. There was a large impacted stone at the infundibulum which made this difficult. We also had a tear in the fundus of the gallbladder during retraction was spilled bile but no stones. This was controlled with the suction . I was able to strip away the thick inflammatory rind and get down to the gallbladder wall. Was also able to find the milk the impacted stone into the fundus and grabbed the infundibulum. At this point I was able to clearly identify the cystic duct and  cystic artery. The cystic duct and artery were doubly clipped proximally and once distally and then divided. The gallbladder was then dissected off the gallbladder fossa using electrocautery. During dissection additional posterior arterial branch was identified and clipped. Once dissection was completed, the gallbladder was placed into an EndoCatch bag and removed through the subxiphoid port. The right upper quadrant was then irrigated and then suctioned. The dissection field was inspected for hemostasis, bile leak and to confirmed clips were in place. Additional local anesthetic was injected around the port sites under direct visualization.  The subxiphoid fascial incision was closed with a 0 Vicryl suture. The abdomen was desufflated and ports removed. Additional local anesthetic was injected and all the skin incisions were closed using a 4-0 Monocryl subcuticular stitch. Dermabond was then applied. The patient was awakened from general anesthesia without complication and returned to the Postoperative Recovery Unit in stable condition. At the end of the case, sponge, instrument and needle counts were correct on 2 occasions. I was present and scrubbed throughout the entirety of the case.    Due to patient's morbid obesity and acute inflammation we had limited visibility and difficulty with retraction and dissection. Dr. Mitchell's assistance was required in order to provide additional laparoscopic seals the help complete the procedure in a safe manner.    Disposition:  PACU then admit back to hospital

## 2023-03-07 NOTE — ANESTHESIA PROCEDURE NOTES
Intubation    Date/Time: 3/7/2023 2:44 PM  Performed by: Tawanda Peng CRNA  Authorized by: Ji Donis MD     Intubation:     Induction:  Intravenous    Intubated:  Postinduction    Mask Ventilation:  Easy mask    Attempts:  1    Attempted By:  CRNA    Method of Intubation:  Video laryngoscopy    Blade:  Wright 3    Laryngeal View Grade: Grade I - full view of cords      Difficult Airway Encountered?: No      Complications:  None    Airway Device:  Oral endotracheal tube    Airway Device Size:  7.5    Style/Cuff Inflation:  Cuffed    Inflation Amount (mL):  7    Tube secured:  21    Secured at:  The lips    Placement Verified By:  Capnometry    Complicating Factors:  None    Findings Post-Intubation:  BS equal bilateral and atraumatic/condition of teeth unchanged

## 2023-03-08 VITALS
BODY MASS INDEX: 45.99 KG/M2 | SYSTOLIC BLOOD PRESSURE: 152 MMHG | RESPIRATION RATE: 18 BRPM | TEMPERATURE: 98 F | DIASTOLIC BLOOD PRESSURE: 82 MMHG | OXYGEN SATURATION: 95 % | HEIGHT: 67 IN | HEART RATE: 54 BPM | WEIGHT: 293 LBS

## 2023-03-08 PROBLEM — K80.00 CHOLELITHIASIS WITH ACUTE CHOLECYSTITIS: Status: RESOLVED | Noted: 2023-03-02 | Resolved: 2023-03-08

## 2023-03-08 LAB
ALBUMIN SERPL BCP-MCNC: 2.2 G/DL (ref 3.5–5.2)
ALP SERPL-CCNC: 99 U/L (ref 55–135)
ALT SERPL W/O P-5'-P-CCNC: 37 U/L (ref 10–44)
ANION GAP SERPL CALC-SCNC: 11 MMOL/L (ref 8–16)
AST SERPL-CCNC: 20 U/L (ref 10–40)
BASOPHILS NFR BLD: 0 % (ref 0–1.9)
BILIRUB SERPL-MCNC: 0.9 MG/DL (ref 0.1–1)
BUN SERPL-MCNC: 17 MG/DL (ref 6–20)
CALCIUM SERPL-MCNC: 8.2 MG/DL (ref 8.7–10.5)
CHLORIDE SERPL-SCNC: 100 MMOL/L (ref 95–110)
CO2 SERPL-SCNC: 26 MMOL/L (ref 23–29)
CREAT SERPL-MCNC: 1.2 MG/DL (ref 0.5–1.4)
DIFFERENTIAL METHOD: ABNORMAL
EOSINOPHIL NFR BLD: 2 % (ref 0–8)
ERYTHROCYTE [DISTWIDTH] IN BLOOD BY AUTOMATED COUNT: 15.6 % (ref 11.5–14.5)
EST. GFR  (NO RACE VARIABLE): 55.5 ML/MIN/1.73 M^2
GLUCOSE SERPL-MCNC: 175 MG/DL (ref 70–110)
GLUCOSE SERPL-MCNC: 177 MG/DL (ref 70–110)
HCT VFR BLD AUTO: 31.4 % (ref 37–48.5)
HGB BLD-MCNC: 10.1 G/DL (ref 12–16)
IMM GRANULOCYTES # BLD AUTO: ABNORMAL K/UL (ref 0–0.04)
IMM GRANULOCYTES NFR BLD AUTO: ABNORMAL % (ref 0–0.5)
LYMPHOCYTES NFR BLD: 6 % (ref 18–48)
MAGNESIUM SERPL-MCNC: 1.8 MG/DL (ref 1.6–2.6)
MCH RBC QN AUTO: 28.7 PG (ref 27–31)
MCHC RBC AUTO-ENTMCNC: 32.2 G/DL (ref 32–36)
MCV RBC AUTO: 89 FL (ref 82–98)
METAMYELOCYTES NFR BLD MANUAL: 1 %
MONOCYTES NFR BLD: 4 % (ref 4–15)
MYELOCYTES NFR BLD MANUAL: 3 %
NEUTROPHILS NFR BLD: 78 % (ref 38–73)
NEUTS BAND NFR BLD MANUAL: 6 %
NRBC BLD-RTO: 0 /100 WBC
PLATELET # BLD AUTO: 321 K/UL (ref 150–450)
PLATELET BLD QL SMEAR: ABNORMAL
PMV BLD AUTO: 10.2 FL (ref 9.2–12.9)
POTASSIUM SERPL-SCNC: 3.6 MMOL/L (ref 3.5–5.1)
PROT SERPL-MCNC: 6.2 G/DL (ref 6–8.4)
RBC # BLD AUTO: 3.52 M/UL (ref 4–5.4)
SODIUM SERPL-SCNC: 137 MMOL/L (ref 136–145)
WBC # BLD AUTO: 13.21 K/UL (ref 3.9–12.7)

## 2023-03-08 PROCEDURE — C9113 INJ PANTOPRAZOLE SODIUM, VIA: HCPCS | Performed by: SURGERY

## 2023-03-08 PROCEDURE — 85027 COMPLETE CBC AUTOMATED: CPT | Performed by: SURGERY

## 2023-03-08 PROCEDURE — 63600175 PHARM REV CODE 636 W HCPCS: Performed by: SURGERY

## 2023-03-08 PROCEDURE — 80053 COMPREHEN METABOLIC PANEL: CPT | Performed by: SURGERY

## 2023-03-08 PROCEDURE — 36415 COLL VENOUS BLD VENIPUNCTURE: CPT | Performed by: SURGERY

## 2023-03-08 PROCEDURE — 83735 ASSAY OF MAGNESIUM: CPT | Performed by: SURGERY

## 2023-03-08 PROCEDURE — 25000003 PHARM REV CODE 250: Performed by: SURGERY

## 2023-03-08 PROCEDURE — 85007 BL SMEAR W/DIFF WBC COUNT: CPT | Performed by: SURGERY

## 2023-03-08 PROCEDURE — 94761 N-INVAS EAR/PLS OXIMETRY MLT: CPT

## 2023-03-08 RX ORDER — LISINOPRIL 10 MG/1
10 TABLET ORAL DAILY
Qty: 30 TABLET | Refills: 2 | Status: SHIPPED | OUTPATIENT
Start: 2023-03-09 | End: 2023-06-21

## 2023-03-08 RX ORDER — HYDROCODONE BITARTRATE AND ACETAMINOPHEN 5; 325 MG/1; MG/1
2 TABLET ORAL EVERY 6 HOURS PRN
Qty: 20 TABLET | Refills: 0 | Status: SHIPPED | OUTPATIENT
Start: 2023-03-08 | End: 2023-06-21

## 2023-03-08 RX ADMIN — ESCITALOPRAM OXALATE 20 MG: 10 TABLET ORAL at 08:03

## 2023-03-08 RX ADMIN — PANTOPRAZOLE SODIUM 40 MG: 40 INJECTION, POWDER, LYOPHILIZED, FOR SOLUTION INTRAVENOUS at 05:03

## 2023-03-08 RX ADMIN — LEVOTHYROXINE SODIUM 100 MCG: 0.1 TABLET ORAL at 05:03

## 2023-03-08 RX ADMIN — PIPERACILLIN AND TAZOBACTAM 4.5 G: 4; .5 INJECTION, POWDER, LYOPHILIZED, FOR SOLUTION INTRAVENOUS; PARENTERAL at 05:03

## 2023-03-08 RX ADMIN — LISINOPRIL 10 MG: 2.5 TABLET ORAL at 08:03

## 2023-03-08 NOTE — DISCHARGE SUMMARY
[facility] Medicine  Discharge Summary      Patient Name: Lavinia Herrera  MRN: [id number]  LINDY: [id number]  Patient Class: IP- Inpatient  Admission Date: 3/1/2023  Hospital Length of Stay: 5 days  Discharge Date and Time: 3/8/2023  1:20 PM  Attending Physician: No att. providers found   Discharging Provider: Franklin Shafer MD  Primary Care Provider: Oniel Malik MD    Primary Care Team: Networked reference to record PCT     HPI:   Patient is a 48-year-old female, who presents emergency room complaining of abdominal pain.  Patient states that she is had abdominal pain for 2 days worsening without relieving factors.  She denies any nausea or vomiting.  No fevers chills.  Her past medical significant for hypothyroidism, diabetes mellitus and elevated blood pressure.  Patient denies any chest pains.  No shortness breath.    Plan to place patient in observation.  Consult general surgery.  Obtain an MRCP.  Continue IV antibiotics.  Plan per surgery    Elevated transaminase and elevated bilirubin most likely caused from cholelithiasis.  Elevated blood pressure probably related to pain discomfort will add ACE inhibitor for renal protection for diabetes and blood pressure control      Procedure(s) (LRB):  ERCP (ENDOSCOPIC RETROGRADE CHOLANGIOPANCREATOGRAPHY) (N/A)      Hospital Course:   Patient was admitted with cholelithiasis with acute cholecystitis.  She also had biliary pancreatitis.  Lab work showed elevation of transaminases and bilirubin.  She was unable to undergo MRCP  due to her body habitus.  Gastroenterology consultant on this patient.  Endoscopic ultrasound was performed, which revealed sludge and a 1-2 mm stone fragment in the distal duct.  The duct had diffuse thickening, maximum diameter of 5 mm.  Gallbladder appeared contracted and inflamed.  The ampulla appeared irritated.  Patient underwent ERCP.  Evident was a very inflamed and swollen ampulla.  Was difficult to visualize  or even reach the ampulla due to the swelling and patient's morbid obesity.  The ventral pancreatic duct was cannulated and a stent was deployed.  Case was ended.  The patient was transferred back to the [facility] unit.  The plan was repeat ERCP after about five days, enough time for the swelling to decrease in severity.  During those five days, analgesics were used to minimize pain.  She did not have any major episodes of emesis.  In the 2nd ERCP, the ampulla was able to be reached and the bile duct was deeply cannulated.  Imaging was performed with injection of contrast into the biliary tree.  A balloon was used to sweep the biliary tree, at the beginning at the bifurcation.  No stones remained.  Patient had less pain after the procedure.  She no longer had any need for inpatient services and was discharged home.    Physical Exam  Constitutional:       General: She is not in acute distress.     Appearance: She is not ill-appearing.   Eyes:      General:         Right eye: No discharge.         Left eye: No discharge.   Neck:      Vascular: No JVD.   Cardiovascular:      Rate and Rhythm: Normal rate and regular rhythm.   Pulmonary:      Effort: Pulmonary effort is normal.      Breath sounds: Normal breath sounds.   Abdominal:      General: Abdomen is flat. Bowel sounds are normal. There is no distension.      Palpations: Abdomen is soft.      Tenderness: There is abdominal tenderness. There is no rebound.        Goals of Care Treatment Preferences:  Code Status: Full Code      Consults:   Consults (From admission, onward)        Status Ordering Provider     Inpatient consult to Gastroenterology  Once        Provider:  Xavi Vaca MD    Completed SHANA BRAGA JR     Inpatient consult to Registered Dietitian/Nutritionist  Once        Provider:  (Not yet assigned)    Completed ZOEY VILLAFANA     Inpatient consult to Hospitalist  Once        Provider:  Zoey Villafana MD    Acknowledged SHANA BRAGA  JR     Inpatient consult to General surgery  Once        Provider:  Master Julian MD    Completed JEANNA VILLAFANA          No new Assessment & Plan notes have been filed under this hospital service since the last note was generated.  Service: Hospital Medicine    Final Active Diagnoses:    Diagnosis Date Noted POA    Type 2 diabetes mellitus without complication, without long-term current use of insulin [E11.9] 03/02/2023 Yes    Hypothyroidism [E03.9] 01/09/2019 Yes    Morbid obesity with BMI of 60.0-69.9, adult [E66.01, Z68.44] 10/10/2018 Not Applicable      Problems Resolved During this Admission:    Diagnosis Date Noted Date Resolved POA    PRINCIPAL PROBLEM:  Cholelithiasis with acute cholecystitis [K80.00] 03/02/2023 03/08/2023 Yes    Hard to intubate [T88.4XXA] 03/03/2023 03/05/2023 No       Discharged Condition: good    Disposition: Home or Self Care    Follow Up:   Follow-up Information     Oniel Malik MD. Go on 3/16/2023.    Specialty: Family Medicine  Why: hospital follow up scheduled March 16th at 1130. You will see the NP for this visit.  Contact information:  [address] [facility] [id number] [phone]                       Patient Instructions:      Diet Adult Regular     Ice to affected area     Lifting restrictions   Order Comments: Please avoid lifting greater than 20 lb, straining, strenuous activity for four weeks.     Change dressing (specify)   Order Comments: Post-Operative Wound Care    A surgical glue has been placed over your incisions, please leave the glue in place and do not attempt to remove it.  It is ok to shower using mild soap and water over the incisions the day after your procedure. Pat dry your incisions. Do not soak in a bathtub or other body of water for 2 weeks or until cleared by your surgeon.     If you noticed redness, swelling, fever, increasing pain or significant drainage from your wound please call/message the office or the 24 hr nurse hotline after  hours.     Notify your health care provider if you experience any of the following:  temperature >100.4     Notify your health care provider if you experience any of the following:  persistent nausea and vomiting or diarrhea     Notify your health care provider if you experience any of the following:  severe uncontrolled pain     Notify your health care provider if you experience any of the following:  redness, tenderness, or signs of infection (pain, swelling, redness, odor or green/yellow discharge around incision site)     Notify your health care provider if you experience any of the following:  worsening rash     Notify your health care provider if you experience any of the following:  increased confusion or weakness     Shower on day dressing removed (No bath)       Significant Diagnostic Studies:   Lab Results   Component Value Date    ALT 37 03/08/2023    AST 20 03/08/2023    ALKPHOS 99 03/08/2023    BILITOT 0.9 03/08/2023     Lab Results   Component Value Date    WBC 13.21 (H) 03/08/2023    HGB 10.1 (L) 03/08/2023    HCT 31.4 (L) 03/08/2023    MCV 89 03/08/2023     03/08/2023        Medications:  Reconciled Home Medications:      Medication List      START taking these medications    HYDROcodone-acetaminophen 5-325 mg per tablet  Commonly known as: NORCO  Take 2 tablets by mouth every 6 (six) hours as needed (severe pain).     lisinopriL 10 MG tablet  Take 1 tablet (10 mg total) by mouth once daily.  Start taking on: March 9, 2023        CHANGE how you take these medications    MOUNJARO 5 mg/0.5 mL Pnij  Generic drug: tirzepatide  INJECT 5 MG INTO THE SKIN ONCE WEEKLY.  What changed:   · how much to take  · how to take this  · when to take this     pantoprazole 40 MG tablet  Commonly known as: PROTONIX  TAKE 1 TABLET(40 MG) BY MOUTH EVERY DAY  What changed: when to take this        CONTINUE taking these medications    allopurinoL 100 MG tablet  Commonly known as: ZYLOPRIM  Take 1 tablet (100 mg total)  "by mouth once daily.     EScitalopram oxalate 20 MG tablet  Commonly known as: LEXAPRO  Take 1 tablet (20 mg total) by mouth once daily.     ezetimibe 10 mg tablet  Commonly known as: ZETIA  Take 1 tablet (10 mg total) by mouth once daily. Cholesterol     levothyroxine 100 MCG tablet  Commonly known as: SYNTHROID  Take 1 tablet (100 mcg total) by mouth before breakfast.     pen needle, diabetic 32 gauge x 5/32" Ndle  Commonly known as: COMFORT EZ PEN NEEDLES  Use 1x weekly.        STOP taking these medications    indomethacin 25 MG capsule  Commonly known as: INDOCIN     levonorgestreL 21 mcg/24 hours (8 yrs) 52 mg IUD  Commonly known as: MIRENA            Indwelling Lines/Drains at time of discharge:   Lines/Drains/Airways     None                 Time spent on the discharge of patient: 26 minutes         Franklin Shafer MD  Department of Hospital Medicine  [facility]  "

## 2023-03-08 NOTE — ANESTHESIA POSTPROCEDURE EVALUATION
Anesthesia Post Evaluation    Patient: Lavinia Herrera    Procedure(s) Performed: Procedure(s) (LRB):  ERCP (ENDOSCOPIC RETROGRADE CHOLANGIOPANCREATOGRAPHY) (N/A)    Final Anesthesia Type: general      Patient location during evaluation: PACU  Patient participation: Yes- Able to Participate  Level of consciousness: awake and alert  Post-procedure vital signs: reviewed and stable  Pain management: adequate  Airway patency: patent  LUCAS mitigation strategies: Extubation while patient is awake, Multimodal analgesia and Extubation and recovery carried out in lateral, semiupright, or other nonsupine position  PONV status at discharge: No PONV  Anesthetic complications: no      Cardiovascular status: stable  Respiratory status: unassisted and spontaneous ventilation  Hydration status: euvolemic  Follow-up not needed.          Vitals Value Taken Time   /101 03/07/23 1741   Temp 37.2 °C (98.9 °F) 03/07/23 1741   Pulse 61 03/07/23 1741   Resp 18 03/07/23 1741   SpO2 99 % 03/07/23 1741         Event Time   Out of Recovery 03/07/2023 16:32:48         Pain/Tariq Score: Pain Rating Prior to Med Admin: 7 (3/7/2023  9:15 AM)  Pain Rating Post Med Admin: 2 (3/6/2023  5:37 PM)  Tariq Score: 9 (3/7/2023  4:30 PM)

## 2023-03-08 NOTE — PLAN OF CARE
03/08/23 0832   Final Note   Assessment Type Final Discharge Note   Anticipated Discharge Disposition Home   What phone number can be called within the next 1-3 days to see how you are doing after discharge? [phone]   Hospital Resources/Appts/Education Provided Provided patient/caregiver with written discharge plan information;Appointments scheduled and added to AVS   Post-Acute Status   Discharge Delays None known at this time     Discharge orders and chart reviewed. No other discharge needs noted at this time. Pt is clear for discharge from case management. Pt is discharging to home.    PCP appt scheduled and added to patient's AVS.     Patient to be seen by Dr. Shafer prior to hospital discharge.

## 2023-03-08 NOTE — SUBJECTIVE & OBJECTIVE
Interval History:  ERCP to be done today.  Pt continues with abdominal pain.    Review of Systems   Constitutional:  Negative for chills and fever.   Respiratory:  Negative for cough and shortness of breath.    Cardiovascular:  Negative for chest pain and leg swelling.   Gastrointestinal:  Positive for abdominal pain. Negative for nausea and vomiting.   Objective:     Vital Signs (Most Recent):  Temp: 98.9 °F (37.2 °C) (03/07/23 1741)  Pulse: 61 (03/07/23 1741)  Resp: 18 (03/07/23 1741)  BP: (!) 172/101 (03/07/23 1741)  SpO2: 99 % (03/07/23 1741)   Vital Signs (24h Range):  Temp:  [97.5 °F (36.4 °C)-99 °F (37.2 °C)] 98.9 °F (37.2 °C)  Pulse:  [56-72] 61  Resp:  [16-22] 18  SpO2:  [94 %-99 %] 99 %  BP: (139-184)/() 172/101     Weight: (!) 201.7 kg (444 lb 11.2 oz)  Body mass index is 69.65 kg/m².    Intake/Output Summary (Last 24 hours) at 3/7/2023 1807  Last data filed at 3/7/2023 1542  Gross per 24 hour   Intake 500 ml   Output --   Net 500 ml        Physical Exam  Constitutional:       General: She is not in acute distress.     Appearance: She is not ill-appearing.   Eyes:      General:         Right eye: No discharge.         Left eye: No discharge.   Neck:      Vascular: No JVD.   Cardiovascular:      Rate and Rhythm: Normal rate and regular rhythm.   Pulmonary:      Effort: Pulmonary effort is normal.      Breath sounds: Normal breath sounds.   Abdominal:      General: Abdomen is flat. Bowel sounds are normal. There is no distension.      Palpations: Abdomen is soft.      Tenderness: There is abdominal tenderness. There is no rebound.   Musculoskeletal:      Right lower leg: No edema.      Left lower leg: No edema.   Skin:     General: Skin is warm and moist.      Findings: No rash.   Neurological:      Mental Status: She is alert and oriented to person, place, and time.   Psychiatric:         Attention and Perception: Attention normal.         Mood and Affect: Mood and affect normal.         Speech:  Speech normal.       Significant Labs: All pertinent labs within the past 24 hours have been reviewed.    Significant Imaging: No new imaging

## 2023-03-08 NOTE — HOSPITAL COURSE
Patient was admitted with cholelithiasis with acute cholecystitis.  She also had biliary pancreatitis.  Lab work showed elevation of transaminases and bilirubin.  She was unable to undergo MRCP  due to her body habitus.  Gastroenterology consultant on this patient.  Endoscopic ultrasound was performed, which revealed sludge and a 1-2 mm stone fragment in the distal duct.  The duct had diffuse thickening, maximum diameter of 5 mm.  Gallbladder appeared contracted and inflamed.  The ampulla appeared irritated.  Patient underwent ERCP.  Evident was a very inflamed and swollen ampulla.  Was difficult to visualize or even reach the ampulla due to the swelling and patient's morbid obesity.  The ventral pancreatic duct was cannulated and a stent was deployed.  Case was ended.  The patient was transferred back to the [facility] unit.  The plan was repeat ERCP after about five days, enough time for the swelling to decrease in severity.  During those five days, analgesics were used to minimize pain.  She did not have any major episodes of emesis.  In the 2nd ERCP, the ampulla was able to be reached and the bile duct was deeply cannulated.  Imaging was performed with injection of contrast into the biliary tree.  A balloon was used to sweep the biliary tree, at the beginning at the bifurcation.  No stones remained.  Patient had less pain after the procedure.  She no longer had any need for inpatient services and was discharged home.    Physical Exam  Constitutional:       General: She is not in acute distress.     Appearance: She is not ill-appearing.   Eyes:      General:         Right eye: No discharge.         Left eye: No discharge.   Neck:      Vascular: No JVD.   Cardiovascular:      Rate and Rhythm: Normal rate and regular rhythm.   Pulmonary:      Effort: Pulmonary effort is normal.      Breath sounds: Normal breath sounds.   Abdominal:      General: Abdomen is flat. Bowel sounds are normal. There is no distension.       Palpations: Abdomen is soft.      Tenderness: There is abdominal tenderness. There is no rebound.

## 2023-03-08 NOTE — ASSESSMENT & PLAN NOTE
GI and surgeon following.  ERCP attempted last week and failed.  A stent was deployed in the ventral pancreatic duct but none in the CBD (due to edema).  A lap yesica was performed.  Today, another attempt at ERCP and possibly biliary stent will be done.  In the meantime, provide analgesia.

## 2023-03-08 NOTE — PROGRESS NOTES
[facility]  Progress Note    Patient Name: Lavinia Herrera  MRN: [id number]  Patient Class: IP- Inpatient   Admission Date: 3/1/2023  Length of Stay: 4 days  Attending Physician: Franklin Shafer MD  Primary Care Provider: Oniel Malik MD        Subjective:     Principal Problem:Cholelithiasis with acute cholecystitis        HPI:  Patient is a 48-year-old female, who presents emergency room complaining of abdominal pain.  Patient states that she is had abdominal pain for 2 days worsening without relieving factors.  She denies any nausea or vomiting.  No fevers chills.  Her past medical significant for hypothyroidism, diabetes mellitus and elevated blood pressure.  Patient denies any chest pains.  No shortness breath.    Plan to place patient in observation.  Consult general surgery.  Obtain an MRCP.  Continue IV antibiotics.  Plan per surgery    Elevated transaminase and elevated bilirubin most likely caused from cholelithiasis.  Elevated blood pressure probably related to pain discomfort will add ACE inhibitor for renal protection for diabetes and blood pressure control      Overview/Hospital Course:  No notes on file    Interval History:  ERCP to be done today.  Pt continues with abdominal pain.    Review of Systems   Constitutional:  Negative for chills and fever.   Respiratory:  Negative for cough and shortness of breath.    Cardiovascular:  Negative for chest pain and leg swelling.   Gastrointestinal:  Positive for abdominal pain. Negative for nausea and vomiting.   Objective:     Vital Signs (Most Recent):  Temp: 98.9 °F (37.2 °C) (03/07/23 1741)  Pulse: 61 (03/07/23 1741)  Resp: 18 (03/07/23 1741)  BP: (!) 172/101 (03/07/23 1741)  SpO2: 99 % (03/07/23 1741)   Vital Signs (24h Range):  Temp:  [97.5 °F (36.4 °C)-99 °F (37.2 °C)] 98.9 °F (37.2 °C)  Pulse:  [56-72] 61  Resp:  [16-22] 18  SpO2:  [94 %-99 %] 99 %  BP: (139-184)/() 172/101     Weight: (!) 201.7 kg (444 lb  11.2 oz)  Body mass index is 69.65 kg/m².    Intake/Output Summary (Last 24 hours) at 3/7/2023 1807  Last data filed at 3/7/2023 1542  Gross per 24 hour   Intake 500 ml   Output --   Net 500 ml        Physical Exam  Constitutional:       General: She is not in acute distress.     Appearance: She is not ill-appearing.   Eyes:      General:         Right eye: No discharge.         Left eye: No discharge.   Neck:      Vascular: No JVD.   Cardiovascular:      Rate and Rhythm: Normal rate and regular rhythm.   Pulmonary:      Effort: Pulmonary effort is normal.      Breath sounds: Normal breath sounds.   Abdominal:      General: Abdomen is flat. Bowel sounds are normal. There is no distension.      Palpations: Abdomen is soft.      Tenderness: There is abdominal tenderness. There is no rebound.   Musculoskeletal:      Right lower leg: No edema.      Left lower leg: No edema.   Skin:     General: Skin is warm and moist.      Findings: No rash.   Neurological:      Mental Status: She is alert and oriented to person, place, and time.   Psychiatric:         Attention and Perception: Attention normal.         Mood and Affect: Mood and affect normal.         Speech: Speech normal.       Significant Labs: All pertinent labs within the past 24 hours have been reviewed.    Significant Imaging: No new imaging      Assessment/Plan:      * Cholelithiasis with acute cholecystitis  GI and surgeon following.  ERCP attempted last week and failed.  A stent was deployed in the ventral pancreatic duct but none in the CBD (due to edema).  A lap yesica was performed.  Today, another attempt at ERCP and possibly biliary stent will be done.  In the meantime, provide analgesia.    Type 2 diabetes mellitus without complication, without long-term current use of insulin  Patient's FSGs are controlled on current medication regimen.  Last A1c reviewed-   Lab Results   Component Value Date    HGBA1C 7.1 (H) 11/02/2022       Current correctional scale   Medium  Increase anti-hyperglycemic dose as follows-   Antihyperglycemics (From admission, onward)    Start     Stop Route Frequency Ordered    03/02/23 0410  insulin aspart U-100 pen 1-10 Units         -- SubQ Before meals & nightly PRN 03/02/23 0410        Hold Oral hypoglycemics while patient is in the hospital.    Hypothyroidism  Chronic problem. Will continue chronic medications and monitor for any changes, adjusting as needed.            Morbid obesity with BMI of 60.0-69.9, adult  Body mass index is 67.03 kg/m². Morbid obesity complicates all aspects of disease management from diagnostic modalities to treatment. Weight loss encouraged and health benefits explained to patient.           VTE Risk Mitigation (From admission, onward)         Ordered     IP VTE HIGH RISK PATIENT  Once         03/02/23 0410     Place sequential compression device  Until discontinued         03/02/23 0410                Discharge Planning   JOSE ANTONIO: 3/8/2023     Code Status: Full Code   Is the patient medically ready for discharge?:     Reason for patient still in hospital (select all that apply): Patient trending condition, Treatment, Imaging and Consult recommendations  Discharge Plan A: Home with family   Discharge Delays: (!) Procedure Scheduling (IR, OR, Labs, Echo, Cath, Echo, EEG)              Franklin Shafer MD  Department of Hospital Medicine   [facility]

## 2023-03-08 NOTE — PROGRESS NOTES
General Surgery Progress Note    Admit Date: 3/1/2023  S/P: Procedure(s) (LRB):  ERCP (ENDOSCOPIC RETROGRADE CHOLANGIOPANCREATOGRAPHY) (N/A)    Post-operative Day: 1 Day Post-Op    Hospital Day: 8    SUBJECTIVE:   Status post repeat ERCP attempt yesterday. Sludge was present was biliary tree now cleared. Patient seen this morning. Still with some pain but this was relieved with a bowel. Nausea or vomiting    OBJECTIVE:     Vital Signs (Most Recent)  Temp:  [97.5 °F (36.4 °C)-99 °F (37.2 °C)] 97.7 °F (36.5 °C)  Pulse:  [56-72] 59  Resp:  [16-22] 18  SpO2:  [94 %-99 %] 95 %  BP: (135-183)/() 183/92    I&Os:  I/O last 3 completed shifts:  In: 500 [IV Piggyback:500]  Out: -     Physical Exam:  Gen: NAD, AAOx3  HEENT: Anicteric sclera  Pulm: unlabored, symmetrical   Abd:  soft with appropriate tenderness palpation, ecchymosis specially around the epigastric port site but stable, no rebound, no guarding    Laboratory:  CBC:   Recent Labs   Lab 03/08/23  0531   WBC 13.21*   RBC 3.52*   HGB 10.1*   HCT 31.4*      MCV 89   MCH 28.7   MCHC 32.2     CMP:   Recent Labs   Lab 03/08/23  0531   *   CALCIUM 8.2*   ALBUMIN 2.2*   PROT 6.2      K 3.6   CO2 26      BUN 17   CREATININE 1.2   ALKPHOS 99   ALT 37   AST 20   BILITOT 0.9     Labs within the past 24 hours have been reviewed.    ASSESSMENT/PLAN:     Patient Active Problem List    Diagnosis Date Noted    Type 2 diabetes mellitus without complication, without long-term current use of insulin 03/02/2023    Cholelithiasis with acute cholecystitis 03/02/2023    Grief 07/19/2022    Hypothyroidism 01/09/2019    Morbid obesity with BMI of 60.0-69.9, adult 10/10/2018    GERD (gastroesophageal reflux disease) 04/18/2017    Impaired fasting blood sugar 12/20/2015    Seasonal allergies     Elevated blood pressure 06/15/2015    Snoring 09/15/2014         48 y.o. female  status post laparoscopic cholecystectomy and ERCP for acute cholecystitis with biliary  obstruction  - cholecystectomy completed, ERCP has cleared biliary tree  -  okay to stop antibiotics from surgical standpoint  -  diet as tolerated  -  discharge once pain adequately controlled  -  surgical discharge instructions will be left in the chart    Please call with any questions or concerns.    Cade Elkins MD  General Surgery  [phone]

## 2023-03-08 NOTE — CARE UPDATE
03/07/23 1902   Patient Assessment/Suction   Level of Consciousness (AVPU) alert   Respiratory Effort Unlabored   PRE-TX-O2   Device (Oxygen Therapy) room air   SpO2 96 %   Pulse Oximetry Type Intermittent   $ Pulse Oximetry - Multiple Charge Pulse Oximetry - Multiple   Pulse 62   Resp 20   Preset CPAP/BiPAP Settings   Mode Of Delivery other (see comments)  (home cpap at bedside)

## 2023-03-16 ENCOUNTER — OFFICE VISIT (OUTPATIENT)
Dept: FAMILY MEDICINE | Facility: CLINIC | Age: 48
End: 2023-03-16
Payer: COMMERCIAL

## 2023-03-16 ENCOUNTER — TELEPHONE (OUTPATIENT)
Dept: SURGERY | Facility: CLINIC | Age: 48
End: 2023-03-16
Payer: COMMERCIAL

## 2023-03-16 VITALS
OXYGEN SATURATION: 95 % | SYSTOLIC BLOOD PRESSURE: 138 MMHG | BODY MASS INDEX: 45.99 KG/M2 | HEART RATE: 72 BPM | WEIGHT: 293 LBS | HEIGHT: 67 IN | DIASTOLIC BLOOD PRESSURE: 84 MMHG

## 2023-03-16 DIAGNOSIS — Z09 HOSPITAL DISCHARGE FOLLOW-UP: Primary | ICD-10-CM

## 2023-03-16 DIAGNOSIS — E06.3 HYPOTHYROIDISM DUE TO HASHIMOTO'S THYROIDITIS: ICD-10-CM

## 2023-03-16 DIAGNOSIS — E11.69 TYPE 2 DIABETES MELLITUS WITH OTHER SPECIFIED COMPLICATION, WITHOUT LONG-TERM CURRENT USE OF INSULIN: ICD-10-CM

## 2023-03-16 DIAGNOSIS — Z12.31 ENCOUNTER FOR SCREENING MAMMOGRAM FOR MALIGNANT NEOPLASM OF BREAST: ICD-10-CM

## 2023-03-16 DIAGNOSIS — Z12.11 SCREEN FOR COLON CANCER: ICD-10-CM

## 2023-03-16 DIAGNOSIS — E03.8 HYPOTHYROIDISM DUE TO HASHIMOTO'S THYROIDITIS: ICD-10-CM

## 2023-03-16 PROCEDURE — 1159F PR MEDICATION LIST DOCUMENTED IN MEDICAL RECORD: ICD-10-PCS | Mod: CPTII,S$GLB,, | Performed by: PHYSICIAN ASSISTANT

## 2023-03-16 PROCEDURE — 3008F BODY MASS INDEX DOCD: CPT | Mod: CPTII,S$GLB,, | Performed by: PHYSICIAN ASSISTANT

## 2023-03-16 PROCEDURE — 1111F PR DISCHARGE MEDS RECONCILED W/ CURRENT OUTPATIENT MED LIST: ICD-10-PCS | Mod: CPTII,S$GLB,, | Performed by: PHYSICIAN ASSISTANT

## 2023-03-16 PROCEDURE — 4010F ACE/ARB THERAPY RXD/TAKEN: CPT | Mod: CPTII,S$GLB,, | Performed by: PHYSICIAN ASSISTANT

## 2023-03-16 PROCEDURE — 99999 PR PBB SHADOW E&M-EST. PATIENT-LVL IV: CPT | Mod: PBBFAC,,, | Performed by: PHYSICIAN ASSISTANT

## 2023-03-16 PROCEDURE — 3008F PR BODY MASS INDEX (BMI) DOCUMENTED: ICD-10-PCS | Mod: CPTII,S$GLB,, | Performed by: PHYSICIAN ASSISTANT

## 2023-03-16 PROCEDURE — 4010F PR ACE/ARB THEARPY RXD/TAKEN: ICD-10-PCS | Mod: CPTII,S$GLB,, | Performed by: PHYSICIAN ASSISTANT

## 2023-03-16 PROCEDURE — 3075F SYST BP GE 130 - 139MM HG: CPT | Mod: CPTII,S$GLB,, | Performed by: PHYSICIAN ASSISTANT

## 2023-03-16 PROCEDURE — 99999 PR PBB SHADOW E&M-EST. PATIENT-LVL IV: ICD-10-PCS | Mod: PBBFAC,,, | Performed by: PHYSICIAN ASSISTANT

## 2023-03-16 PROCEDURE — 3079F PR MOST RECENT DIASTOLIC BLOOD PRESSURE 80-89 MM HG: ICD-10-PCS | Mod: CPTII,S$GLB,, | Performed by: PHYSICIAN ASSISTANT

## 2023-03-16 PROCEDURE — 1159F MED LIST DOCD IN RCRD: CPT | Mod: CPTII,S$GLB,, | Performed by: PHYSICIAN ASSISTANT

## 2023-03-16 PROCEDURE — 3072F PR LOW RISK FOR RETINOPATHY: ICD-10-PCS | Mod: CPTII,S$GLB,, | Performed by: PHYSICIAN ASSISTANT

## 2023-03-16 PROCEDURE — 99213 OFFICE O/P EST LOW 20 MIN: CPT | Mod: S$GLB,,, | Performed by: PHYSICIAN ASSISTANT

## 2023-03-16 PROCEDURE — 99213 PR OFFICE/OUTPT VISIT, EST, LEVL III, 20-29 MIN: ICD-10-PCS | Mod: S$GLB,,, | Performed by: PHYSICIAN ASSISTANT

## 2023-03-16 PROCEDURE — 3075F PR MOST RECENT SYSTOLIC BLOOD PRESS GE 130-139MM HG: ICD-10-PCS | Mod: CPTII,S$GLB,, | Performed by: PHYSICIAN ASSISTANT

## 2023-03-16 PROCEDURE — 3079F DIAST BP 80-89 MM HG: CPT | Mod: CPTII,S$GLB,, | Performed by: PHYSICIAN ASSISTANT

## 2023-03-16 PROCEDURE — 1111F DSCHRG MED/CURRENT MED MERGE: CPT | Mod: CPTII,S$GLB,, | Performed by: PHYSICIAN ASSISTANT

## 2023-03-16 PROCEDURE — 3072F LOW RISK FOR RETINOPATHY: CPT | Mod: CPTII,S$GLB,, | Performed by: PHYSICIAN ASSISTANT

## 2023-03-16 NOTE — PROGRESS NOTES
Subjective:       Patient ID: Lavinia Herrera is a 48 y.o. female.    Chief Complaint: Hospital Follow Up    Pt presents to clinic for follow-up 2/2 3/03/23 cholecystectomy and 3/07/23 ERCP. Reports she overall has been doing well, but does not that she had an attack of severe RUQ pain began radiating to LUQ while dropping her kids off at school this morning. Was able to get herself home, take 3 Tylenol, and lay down to ret. When she got up later in the morning, her pain had significantly improved. However, she is still having some epigastric fullness at this time. Denies any similar episodes since her surgery. Has f/u w/ her surgeon tomorrow. Denies drainage from or notable tenderness to laparoscopy sites. Endorses she has not been taking her Mounjaro since she was dc'd home, was unsure if she was supposed to maintain her usual regimen of weekly injections.    Review of Systems   Constitutional:  Negative for chills and fever.   Respiratory:  Negative for cough and shortness of breath.    Cardiovascular:  Negative for chest pain.   Gastrointestinal:  Positive for abdominal pain. Negative for nausea and vomiting.   Skin:  Positive for wound (laparoscopy sites distributed across abdomen,no active beelding or drainage).   Neurological:  Negative for dizziness and headaches.   All other systems reviewed and are negative.    Objective:      Physical Exam  Constitutional:       General: She is not in acute distress.     Appearance: She is obese. She is not ill-appearing or toxic-appearing.   HENT:      Head: Normocephalic and atraumatic.   Pulmonary:      Effort: Pulmonary effort is normal. No respiratory distress.      Breath sounds: Normal breath sounds.   Abdominal:      Palpations: Abdomen is soft.   Musculoskeletal:         General: Normal range of motion.   Skin:     General: Skin is warm and dry.      Comments: Laparoscopy sites distributed across abdomen. Granulation tissue present, minimal to mild surrounding  "erythema. No drainage or fluctuance.    Large abrasion to L lateral ankle in advanced stages of healing. Granulation tissue over remaining exposed area, no drainage, tenderness, evidence of infection   Neurological:      Mental Status: She is alert and oriented to person, place, and time.   Psychiatric:         Mood and Affect: Mood normal.         Thought Content: Thought content normal.         Judgment: Judgment normal.       Assessment:       1. Hospital discharge follow-up    2. Hypothyroidism due to Hashimoto's thyroiditis    3. Type 2 diabetes mellitus with other specified complication, without long-term current use of insulin    4. Screen for colon cancer    5. Encounter for screening mammogram for malignant neoplasm of breast          Plan:       Lavinia was seen today for hospital follow up.    Diagnoses and all orders for this visit:    Hospital discharge follow-up  Follow up as scheduled with Gen Surg   Hypothyroidism due to Hashimoto's thyroiditis  Continue per endocrinology   Type 2 diabetes mellitus with other specified complication, without long-term current use of insulin  Continue per endocrinology   Screen for colon cancer  -     Cologuard Screening (Multitarget Stool DNA); Future  -     Cologuard Screening (Multitarget Stool DNA)    Encounter for screening mammogram for malignant neoplasm of breast  -     Mammo Digital Screening Bilat w/ Daniel; Future           Follow up for mammo, 3-4 mo routine pcp visit .           Documentation entered by me for this encounter may have been done in part using speech-recognition technology. Although I have made an effort to ensure accuracy, "sound like" errors may exist and should be interpreted in context.  patient was seen and examined by me and I agree with HPI, ROS, PE as well as  assessment and plan       "

## 2023-03-16 NOTE — TELEPHONE ENCOUNTER
----- Message from Rafat Torres sent at 3/16/2023  7:24 AM CDT -----  Type: Needs Medical Advice  Who Called:  Patient  Symptoms (please be specific):  Pain near incision site  How long has patient had these symptoms:This morning    Pharmacy name and phone #:    Best Call Back Number: [phone]  Additional Information: Pt declined transfer to On Call Nurse

## 2023-03-17 ENCOUNTER — OFFICE VISIT (OUTPATIENT)
Dept: SURGERY | Facility: CLINIC | Age: 48
End: 2023-03-17
Payer: COMMERCIAL

## 2023-03-17 ENCOUNTER — PATIENT MESSAGE (OUTPATIENT)
Dept: SURGERY | Facility: CLINIC | Age: 48
End: 2023-03-17

## 2023-03-17 ENCOUNTER — LAB VISIT (OUTPATIENT)
Dept: LAB | Facility: HOSPITAL | Age: 48
End: 2023-03-17
Attending: SURGERY
Payer: COMMERCIAL

## 2023-03-17 VITALS
RESPIRATION RATE: 16 BRPM | BODY MASS INDEX: 45.99 KG/M2 | TEMPERATURE: 98 F | DIASTOLIC BLOOD PRESSURE: 79 MMHG | HEIGHT: 67 IN | HEART RATE: 55 BPM | SYSTOLIC BLOOD PRESSURE: 171 MMHG | WEIGHT: 293 LBS

## 2023-03-17 DIAGNOSIS — K80.50 CHOLEDOCHOLITHIASIS: ICD-10-CM

## 2023-03-17 DIAGNOSIS — Z90.49 S/P LAPAROSCOPIC CHOLECYSTECTOMY: Primary | ICD-10-CM

## 2023-03-17 DIAGNOSIS — Z90.49 S/P LAPAROSCOPIC CHOLECYSTECTOMY: ICD-10-CM

## 2023-03-17 LAB
ALBUMIN SERPL BCP-MCNC: 3 G/DL (ref 3.5–5.2)
ALP SERPL-CCNC: 108 U/L (ref 55–135)
ALT SERPL W/O P-5'-P-CCNC: 23 U/L (ref 10–44)
ANION GAP SERPL CALC-SCNC: 11 MMOL/L (ref 8–16)
AST SERPL-CCNC: 19 U/L (ref 10–40)
BILIRUB SERPL-MCNC: 0.5 MG/DL (ref 0.1–1)
BUN SERPL-MCNC: 16 MG/DL (ref 6–20)
CALCIUM SERPL-MCNC: 9.3 MG/DL (ref 8.7–10.5)
CHLORIDE SERPL-SCNC: 101 MMOL/L (ref 95–110)
CO2 SERPL-SCNC: 26 MMOL/L (ref 23–29)
CREAT SERPL-MCNC: 1.1 MG/DL (ref 0.5–1.4)
ERYTHROCYTE [DISTWIDTH] IN BLOOD BY AUTOMATED COUNT: 14.8 % (ref 11.5–14.5)
EST. GFR  (NO RACE VARIABLE): >60 ML/MIN/1.73 M^2
GLUCOSE SERPL-MCNC: 158 MG/DL (ref 70–110)
HCT VFR BLD AUTO: 34.3 % (ref 37–48.5)
HGB BLD-MCNC: 10.5 G/DL (ref 12–16)
LIPASE SERPL-CCNC: 89 U/L (ref 4–60)
MCH RBC QN AUTO: 28.1 PG (ref 27–31)
MCHC RBC AUTO-ENTMCNC: 30.6 G/DL (ref 32–36)
MCV RBC AUTO: 92 FL (ref 82–98)
PLATELET # BLD AUTO: 339 K/UL (ref 150–450)
PMV BLD AUTO: 10.7 FL (ref 9.2–12.9)
POTASSIUM SERPL-SCNC: 4.7 MMOL/L (ref 3.5–5.1)
PROT SERPL-MCNC: 7.5 G/DL (ref 6–8.4)
RBC # BLD AUTO: 3.74 M/UL (ref 4–5.4)
SODIUM SERPL-SCNC: 138 MMOL/L (ref 136–145)
WBC # BLD AUTO: 9.49 K/UL (ref 3.9–12.7)

## 2023-03-17 PROCEDURE — 80053 COMPREHEN METABOLIC PANEL: CPT | Performed by: SURGERY

## 2023-03-17 PROCEDURE — 3044F PR MOST RECENT HEMOGLOBIN A1C LEVEL <7.0%: ICD-10-PCS | Mod: CPTII,S$GLB,, | Performed by: SURGERY

## 2023-03-17 PROCEDURE — 99999 PR PBB SHADOW E&M-EST. PATIENT-LVL III: CPT | Mod: PBBFAC,,, | Performed by: SURGERY

## 2023-03-17 PROCEDURE — 85027 COMPLETE CBC AUTOMATED: CPT | Performed by: SURGERY

## 2023-03-17 PROCEDURE — 3078F DIAST BP <80 MM HG: CPT | Mod: CPTII,S$GLB,, | Performed by: SURGERY

## 2023-03-17 PROCEDURE — 4010F PR ACE/ARB THEARPY RXD/TAKEN: ICD-10-PCS | Mod: CPTII,S$GLB,, | Performed by: SURGERY

## 2023-03-17 PROCEDURE — 1159F MED LIST DOCD IN RCRD: CPT | Mod: CPTII,S$GLB,, | Performed by: SURGERY

## 2023-03-17 PROCEDURE — 3077F PR MOST RECENT SYSTOLIC BLOOD PRESSURE >= 140 MM HG: ICD-10-PCS | Mod: CPTII,S$GLB,, | Performed by: SURGERY

## 2023-03-17 PROCEDURE — 3072F PR LOW RISK FOR RETINOPATHY: ICD-10-PCS | Mod: CPTII,S$GLB,, | Performed by: SURGERY

## 2023-03-17 PROCEDURE — 3044F HG A1C LEVEL LT 7.0%: CPT | Mod: CPTII,S$GLB,, | Performed by: SURGERY

## 2023-03-17 PROCEDURE — 99024 PR POST-OP FOLLOW-UP VISIT: ICD-10-PCS | Mod: S$GLB,,, | Performed by: SURGERY

## 2023-03-17 PROCEDURE — 3078F PR MOST RECENT DIASTOLIC BLOOD PRESSURE < 80 MM HG: ICD-10-PCS | Mod: CPTII,S$GLB,, | Performed by: SURGERY

## 2023-03-17 PROCEDURE — 4010F ACE/ARB THERAPY RXD/TAKEN: CPT | Mod: CPTII,S$GLB,, | Performed by: SURGERY

## 2023-03-17 PROCEDURE — 99999 PR PBB SHADOW E&M-EST. PATIENT-LVL III: ICD-10-PCS | Mod: PBBFAC,,, | Performed by: SURGERY

## 2023-03-17 PROCEDURE — 3077F SYST BP >= 140 MM HG: CPT | Mod: CPTII,S$GLB,, | Performed by: SURGERY

## 2023-03-17 PROCEDURE — 3008F PR BODY MASS INDEX (BMI) DOCUMENTED: ICD-10-PCS | Mod: CPTII,S$GLB,, | Performed by: SURGERY

## 2023-03-17 PROCEDURE — 1159F PR MEDICATION LIST DOCUMENTED IN MEDICAL RECORD: ICD-10-PCS | Mod: CPTII,S$GLB,, | Performed by: SURGERY

## 2023-03-17 PROCEDURE — 3008F BODY MASS INDEX DOCD: CPT | Mod: CPTII,S$GLB,, | Performed by: SURGERY

## 2023-03-17 PROCEDURE — 99024 POSTOP FOLLOW-UP VISIT: CPT | Mod: S$GLB,,, | Performed by: SURGERY

## 2023-03-17 PROCEDURE — 83690 ASSAY OF LIPASE: CPT | Performed by: SURGERY

## 2023-03-17 PROCEDURE — 3072F LOW RISK FOR RETINOPATHY: CPT | Mod: CPTII,S$GLB,, | Performed by: SURGERY

## 2023-03-22 NOTE — PROGRESS NOTES
GENERAL SURGERY  POST-OP PROGRESS NOTE    HPI: Lavinia Herrera is a 48 y.o. female status post laparoscopic cholecystectomy followed by ERCP for choledocholithiasis.  Subsequently discharged home and is here today for follow-up. Not feeling great but is improved. No fevers or yellowing of the skin or eyes.  Some discomfort and nausea.    VITALS:  Vitals:    03/17/23 1007   BP: (!) 171/79   Pulse: (!) 55   Resp: 16   Temp: 97.5 °F (36.4 °C)       PHYSICAL EXAM:  Abdominal port sites well healed without signs of infection or breakdown    PATHOLOGY:  GALLBLADDER:   - CHRONIC AND ACUTE CHOLECYSTITIS.   - CHOLELITHIASIS.   - NEGATIVE FOR DYSPLASIA OR MALIGNANCY.      ASSESSMENT & PLAN:  48 y.o. female s/p laparoscopic cholecystectomy and ERCP for choledocholithiasis and acute cholecystitis  -pathology benign  -still with some symptoms therefore will obtain CBC, CMP and lipase  -if normal recommend continued observation, if abnormal may need presentation to the emergency room for possible retained stone  -no heavy lifting for a total 4 weeks

## 2023-03-28 ENCOUNTER — PATIENT MESSAGE (OUTPATIENT)
Dept: SURGERY | Facility: CLINIC | Age: 48
End: 2023-03-28
Payer: COMMERCIAL

## 2023-03-29 ENCOUNTER — OFFICE VISIT (OUTPATIENT)
Dept: ENDOCRINOLOGY | Facility: CLINIC | Age: 48
End: 2023-03-29
Payer: COMMERCIAL

## 2023-03-29 VITALS
DIASTOLIC BLOOD PRESSURE: 86 MMHG | OXYGEN SATURATION: 94 % | HEIGHT: 67 IN | SYSTOLIC BLOOD PRESSURE: 132 MMHG | TEMPERATURE: 98 F | WEIGHT: 293 LBS | BODY MASS INDEX: 45.99 KG/M2 | HEART RATE: 75 BPM

## 2023-03-29 DIAGNOSIS — K21.9 GASTROESOPHAGEAL REFLUX DISEASE, UNSPECIFIED WHETHER ESOPHAGITIS PRESENT: ICD-10-CM

## 2023-03-29 DIAGNOSIS — E61.1 IRON DEFICIENCY: ICD-10-CM

## 2023-03-29 DIAGNOSIS — E78.5 HYPERLIPIDEMIA, UNSPECIFIED HYPERLIPIDEMIA TYPE: ICD-10-CM

## 2023-03-29 DIAGNOSIS — E04.1 NODULAR THYROID DISEASE: ICD-10-CM

## 2023-03-29 DIAGNOSIS — E03.8 HYPOTHYROIDISM DUE TO HASHIMOTO'S THYROIDITIS: ICD-10-CM

## 2023-03-29 DIAGNOSIS — E66.01 MORBID OBESITY WITH BMI OF 60.0-69.9, ADULT: ICD-10-CM

## 2023-03-29 DIAGNOSIS — E06.3 HYPOTHYROIDISM DUE TO HASHIMOTO'S THYROIDITIS: ICD-10-CM

## 2023-03-29 DIAGNOSIS — E11.69 TYPE 2 DIABETES MELLITUS WITH OTHER SPECIFIED COMPLICATION, WITHOUT LONG-TERM CURRENT USE OF INSULIN: Primary | ICD-10-CM

## 2023-03-29 PROCEDURE — 1159F PR MEDICATION LIST DOCUMENTED IN MEDICAL RECORD: ICD-10-PCS | Mod: CPTII,S$GLB,, | Performed by: PHYSICIAN ASSISTANT

## 2023-03-29 PROCEDURE — 99214 OFFICE O/P EST MOD 30 MIN: CPT | Mod: S$GLB,,, | Performed by: PHYSICIAN ASSISTANT

## 2023-03-29 PROCEDURE — 3075F SYST BP GE 130 - 139MM HG: CPT | Mod: CPTII,S$GLB,, | Performed by: PHYSICIAN ASSISTANT

## 2023-03-29 PROCEDURE — 3075F PR MOST RECENT SYSTOLIC BLOOD PRESS GE 130-139MM HG: ICD-10-PCS | Mod: CPTII,S$GLB,, | Performed by: PHYSICIAN ASSISTANT

## 2023-03-29 PROCEDURE — 4010F PR ACE/ARB THEARPY RXD/TAKEN: ICD-10-PCS | Mod: CPTII,S$GLB,, | Performed by: PHYSICIAN ASSISTANT

## 2023-03-29 PROCEDURE — 4010F ACE/ARB THERAPY RXD/TAKEN: CPT | Mod: CPTII,S$GLB,, | Performed by: PHYSICIAN ASSISTANT

## 2023-03-29 PROCEDURE — 3079F PR MOST RECENT DIASTOLIC BLOOD PRESSURE 80-89 MM HG: ICD-10-PCS | Mod: CPTII,S$GLB,, | Performed by: PHYSICIAN ASSISTANT

## 2023-03-29 PROCEDURE — 3008F BODY MASS INDEX DOCD: CPT | Mod: CPTII,S$GLB,, | Performed by: PHYSICIAN ASSISTANT

## 2023-03-29 PROCEDURE — 99999 PR PBB SHADOW E&M-EST. PATIENT-LVL IV: CPT | Mod: PBBFAC,,, | Performed by: PHYSICIAN ASSISTANT

## 2023-03-29 PROCEDURE — 99999 PR PBB SHADOW E&M-EST. PATIENT-LVL IV: ICD-10-PCS | Mod: PBBFAC,,, | Performed by: PHYSICIAN ASSISTANT

## 2023-03-29 PROCEDURE — 99214 PR OFFICE/OUTPT VISIT, EST, LEVL IV, 30-39 MIN: ICD-10-PCS | Mod: S$GLB,,, | Performed by: PHYSICIAN ASSISTANT

## 2023-03-29 PROCEDURE — 3079F DIAST BP 80-89 MM HG: CPT | Mod: CPTII,S$GLB,, | Performed by: PHYSICIAN ASSISTANT

## 2023-03-29 PROCEDURE — 3044F PR MOST RECENT HEMOGLOBIN A1C LEVEL <7.0%: ICD-10-PCS | Mod: CPTII,S$GLB,, | Performed by: PHYSICIAN ASSISTANT

## 2023-03-29 PROCEDURE — 3008F PR BODY MASS INDEX (BMI) DOCUMENTED: ICD-10-PCS | Mod: CPTII,S$GLB,, | Performed by: PHYSICIAN ASSISTANT

## 2023-03-29 PROCEDURE — 3072F PR LOW RISK FOR RETINOPATHY: ICD-10-PCS | Mod: CPTII,S$GLB,, | Performed by: PHYSICIAN ASSISTANT

## 2023-03-29 PROCEDURE — 1159F MED LIST DOCD IN RCRD: CPT | Mod: CPTII,S$GLB,, | Performed by: PHYSICIAN ASSISTANT

## 2023-03-29 PROCEDURE — 3072F LOW RISK FOR RETINOPATHY: CPT | Mod: CPTII,S$GLB,, | Performed by: PHYSICIAN ASSISTANT

## 2023-03-29 PROCEDURE — 3044F HG A1C LEVEL LT 7.0%: CPT | Mod: CPTII,S$GLB,, | Performed by: PHYSICIAN ASSISTANT

## 2023-03-29 PROCEDURE — 1111F DSCHRG MED/CURRENT MED MERGE: CPT | Mod: CPTII,S$GLB,, | Performed by: PHYSICIAN ASSISTANT

## 2023-03-29 PROCEDURE — 1111F PR DISCHARGE MEDS RECONCILED W/ CURRENT OUTPATIENT MED LIST: ICD-10-PCS | Mod: CPTII,S$GLB,, | Performed by: PHYSICIAN ASSISTANT

## 2023-03-29 RX ORDER — PANTOPRAZOLE SODIUM 40 MG/1
40 TABLET, DELAYED RELEASE ORAL DAILY
Qty: 90 TABLET | Refills: 1 | Status: SHIPPED | OUTPATIENT
Start: 2023-03-29 | End: 2023-08-21 | Stop reason: SDUPTHER

## 2023-03-29 RX ORDER — EZETIMIBE 10 MG/1
10 TABLET ORAL DAILY
Qty: 90 TABLET | Refills: 3 | Status: SHIPPED | OUTPATIENT
Start: 2023-03-29 | End: 2024-01-20 | Stop reason: SDUPTHER

## 2023-03-29 NOTE — PROGRESS NOTES
CC: This 48 y.o. female presents for management of Diabetes Mellitus  and chronic conditions pending review including HTN, HLP.    HPI: was diagnosed with T2DM in 2017. Her son  from suicide in 10/21.   Has never been hospitalized r/t DM.  Family hx of DM: grandmother  Fhx of thyroid disease:her sister has Hashimoto's Disease  Denies missing doses of DM medication.   hypoglycemia at home: none   monitoring BG at home:  Fastins    Pt was started on Lipitor last visit but experienced side effects. Pt had a cholecystectomy earlier this month.   She could not remember the side effect.    Diet: 3 meals daily.   3 meals daily.  No snacks. Avoids sugary beverages. Drinks protein shake in the morning.    Exercise: Walking in her yard and at parades.     CURRENT DM MEDS: Mounjaro 5 mg weekly (not taking due to cost).  Previous meds:  Metformin-constipation    Standards of Care:  Eye exam:  Dr. [facility]-macular degeneration  Podiatry exam:  me  DE: 10/21 [place]    Hypothyroidism  Taking 100 mcg qd.   Dx in .   No hx of neck radiation.   + fatigue, sweating, occ constipation.   No hair loss, heat/cold intolerance, tremors. Cycles are irregular. She does have a mirena.    Nodular Thyroid Disease  Thyroid u/s  shows two 2 cm nodules in the left thyroid and a 1.4 cm nodule in the right with macrocalcifications. Thyroid u/s  shows nodules have increased in size. No sob, dysphagia or voice changes. FNAs of the right middle nodule and left upper nodule resulted in FLUS.     She is taking vitamin d and zinc.  PMHx, PSHx: reviewed in epic.  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    Wt Readings from Last 10 Encounters:   23 (!) 184.6 kg (407 lb 1.3 oz)   23 (!) 185.9 kg (409 lb 13.4 oz)   23 (!) 185.9 kg (409 lb 13.4 oz)   23 (!) 201.7 kg (444 lb 11.2 oz)   22 (!) 199.5 kg (439 lb 13.1 oz)   22 (!) 191 kg (421 lb 1.3 oz)   22 (!) 189.5  "kg (417 lb 12.4 oz)   01/03/22 (!) 187.3 kg (412 lb 14.8 oz)   12/03/21 (!) 184.3 kg (406 lb 4.9 oz)   11/09/21 (!) 185.4 kg (408 lb 11.7 oz)      ROS:   Gen: Appetite good, + wt loss (32 lbs), denies fatigue and weakness.  Skin: Skin is intact and heals well, no rashes, no hair changes  Eyes: Denies visual disturbances  Resp: no SOB or ANGELES, no cough  Cardiac: No palpitations, chest pain, no edema   GI: No nausea or vomiting, diarrhea, constipation, or abdominal pain.  /GYN: No nocturia, burning or pain.   MS/Neuro: Denies numbness/ tingling in BLE; Gait steady, speech clear  Psych: Denies drug/ETOH abuse, no hx of depression.  Other systems: negative.    /86 (BP Location: Left arm, Patient Position: Sitting, BP Method: Small (Manual))   Pulse 75   Temp 98.2 °F (36.8 °C) (Oral)   Ht 5' 7" (1.702 m)   Wt (!) 184.6 kg (407 lb 1.3 oz)   LMP 02/28/2023   SpO2 (!) 94%   BMI 63.76 kg/m²      PE:  GENERAL: middle aged female, well developed, well nourished.  PSYCH: AAOx3, appropriate mood and affect, pleasant expression, conversant, appears relaxed, well groomed.   EYES: Conjunctiva, corneas clear  NECK: Supple,  + AN  CHEST: Resp even and unlabored,   NEURO: Gait steady, CN ll-Xll grossly intact.  SKIN: Skin warm and dry no acanthosis nigracans.  8/22  Foot Exam: no sores or macerations noted.     Protective Sensation (w/ 10 gram monofilament):  Right: Intact  Left: Intact    Visual Inspection:  Normal -  Bilateral and Nails Intact - without Evidence of Foot Deformity- Bilateral    Pedal Pulses:   Right: Present  Left: Present    Posterior tibialis:   Right:Present  Left: Present     Vibratory Sensation  Right:Positive  Left:Positive     Personally reviewed labs below:    Lab Visit on 03/17/2023   Component Date Value Ref Range Status    WBC 03/17/2023 9.49  3.90 - 12.70 K/uL Final    RBC 03/17/2023 3.74 (L)  4.00 - 5.40 M/uL Final    Hemoglobin 03/17/2023 10.5 (L)  12.0 - 16.0 g/dL Final    Hematocrit " 03/17/2023 34.3 (L)  37.0 - 48.5 % Final    MCV 03/17/2023 92  82 - 98 fL Final    MCH 03/17/2023 28.1  27.0 - 31.0 pg Final    MCHC 03/17/2023 30.6 (L)  32.0 - 36.0 g/dL Final    RDW 03/17/2023 14.8 (H)  11.5 - 14.5 % Final    Platelets 03/17/2023 339  150 - 450 K/uL Final    MPV 03/17/2023 10.7  9.2 - 12.9 fL Final    Sodium 03/17/2023 138  136 - 145 mmol/L Final    Potassium 03/17/2023 4.7  3.5 - 5.1 mmol/L Final    Chloride 03/17/2023 101  95 - 110 mmol/L Final    CO2 03/17/2023 26  23 - 29 mmol/L Final    Glucose 03/17/2023 158 (H)  70 - 110 mg/dL Final    BUN 03/17/2023 16  6 - 20 mg/dL Final    Creatinine 03/17/2023 1.1  0.5 - 1.4 mg/dL Final    Calcium 03/17/2023 9.3  8.7 - 10.5 mg/dL Final    Total Protein 03/17/2023 7.5  6.0 - 8.4 g/dL Final    Albumin 03/17/2023 3.0 (L)  3.5 - 5.2 g/dL Final    Total Bilirubin 03/17/2023 0.5  0.1 - 1.0 mg/dL Final    Comment: For infants and newborns, interpretation of results should be based  on gestational age, weight and in agreement with clinical  observations.    Premature Infant recommended reference ranges:  Up to 24 hours.............<8.0 mg/dL  Up to 48 hours............<12.0 mg/dL  3-5 days..................<15.0 mg/dL  6-29 days.................<15.0 mg/dL      Alkaline Phosphatase 03/17/2023 108  55 - 135 U/L Final    AST 03/17/2023 19  10 - 40 U/L Final    ALT 03/17/2023 23  10 - 44 U/L Final    Anion Gap 03/17/2023 11  8 - 16 mmol/L Final    eGFR 03/17/2023 >60  >60 mL/min/1.73 m^2 Final    Lipase 03/17/2023 89 (H)  4 - 60 U/L Final   Lab Visit on 03/17/2023   Component Date Value Ref Range Status    Free T4 03/17/2023 1.11  0.71 - 1.51 ng/dL Final    TSH 03/17/2023 1.340  0.400 - 4.000 uIU/mL Final    Hemoglobin A1C 03/17/2023 6.8 (H)  4.0 - 5.6 % Final    Comment: ADA Screening Guidelines:  5.7-6.4%  Consistent with prediabetes  >or=6.5%  Consistent with diabetes    High levels of fetal hemoglobin interfere with the HbA1C  assay. Heterozygous hemoglobin  variants (HbS, HgC, etc)do  not significantly interfere with this assay.   However, presence of multiple variants may affect accuracy.      Estimated Avg Glucose 03/17/2023 148 (H)  68 - 131 mg/dL Final    Glucose 03/17/2023 158 (H)  70 - 110 mg/dL Final    Sodium 03/17/2023 138  136 - 145 mmol/L Final    Potassium 03/17/2023 4.8  3.5 - 5.1 mmol/L Final    Chloride 03/17/2023 102  95 - 110 mmol/L Final    CO2 03/17/2023 26  23 - 29 mmol/L Final    BUN 03/17/2023 17  6 - 20 mg/dL Final    Calcium 03/17/2023 9.3  8.7 - 10.5 mg/dL Final    Creatinine 03/17/2023 1.1  0.5 - 1.4 mg/dL Final    Albumin 03/17/2023 3.0 (L)  3.5 - 5.2 g/dL Final    Phosphorus 03/17/2023 4.0  2.7 - 4.5 mg/dL Final    eGFR 03/17/2023 >60  >60 mL/min/1.73 m^2 Final    Anion Gap 03/17/2023 10  8 - 16 mmol/L Final   No results displayed because visit has over 200 results.       EXAMINATION:  US SOFT TISSUE HEAD NECK THYROID     CLINICAL HISTORY:  Nontoxic single thyroid nodule     TECHNIQUE:  Ultrasound of the thyroid and cervical lymph nodes was performed.     COMPARISON:  Thyroid ultrasound of February 23, 2022     FINDINGS:  The right lobe measures 6.2 x 2.7 x 3.3 cm for a calculated volume of 29 cc.  The left lobe measures 6 x 3 x 3 cm for a calculated volume of 28 cc and a total thyroid volume of 57 cc.     On today's study on the right there is a 1 cm, a 1.6 cm and a 1.3 cm solid nodule of the right thyroid.  On the left there is a 2.2 cm and a 1.7 cm nodule of the left thyroid.  The patient has undergone bilateral fine-needle aspiration in March 2, 2022.     Impression:     Multinodular goiter.  Enlarging nodules are not seen        Electronically signed by: Yazan Madison MD  Date:                                            08/10/2022  Time:                                           11:16     ASSESSMENT and PLAN:    1. Type 2 diabetes mellitus with other specified complication, without long-term current use of insulin  Hemoglobin A1C       2. Hyperlipidemia, unspecified hyperlipidemia type        3. Hypothyroidism due to Hashimoto's thyroiditis        4. Nodular thyroid disease        5. Iron deficiency        6. Morbid obesity with BMI of 60.0-69.9, adult           T2DM with hyperglycemia-A1c is below goal. Start Jardiance 10 mg daily.  She did not tolerate Metformin in the past. Discussed DM, progression of disease, long term complications, tx options.   Discussed A1c and BG goals.   Reviewed  hypoglycemia, s/s and appropriate tx.   Instructed to monitor BG and bring meter/ log to every clinic visit.   - takes ASA, ACEi,     HLP -stable LDL ,  LFTs WNL. Continue zetia 10 mg daily. Declines statins.  Hypothyroidism-TSH is stable. Continue Levothyroxine to 100 mcg daily. Continue Selenium 200 mcg qd.  Nodular Thyroid disease-pt declines bx. Pt wants to follow up w/ u/s. FNAs showed FLUS>  Iron deficiency-may falsely lower a1c.  Obesity-Body mass index is 63.76 kg/m². Increase exercise to 30 min daily. Decrease carbohydrates in diet.       Follow-up: in 4 months with lab prior and thyroid u/s

## 2023-04-11 ENCOUNTER — PATIENT MESSAGE (OUTPATIENT)
Dept: ADMINISTRATIVE | Facility: HOSPITAL | Age: 48
End: 2023-04-11
Payer: COMMERCIAL

## 2023-06-16 DIAGNOSIS — E11.69 TYPE 2 DIABETES MELLITUS WITH OTHER SPECIFIED COMPLICATION, WITHOUT LONG-TERM CURRENT USE OF INSULIN: ICD-10-CM

## 2023-06-16 RX ORDER — TIRZEPATIDE 5 MG/.5ML
INJECTION, SOLUTION SUBCUTANEOUS
Qty: 4 PEN | Refills: 11 | Status: SHIPPED | OUTPATIENT
Start: 2023-06-16 | End: 2023-08-09

## 2023-06-21 ENCOUNTER — OFFICE VISIT (OUTPATIENT)
Dept: FAMILY MEDICINE | Facility: CLINIC | Age: 48
End: 2023-06-21
Payer: COMMERCIAL

## 2023-06-21 VITALS
HEIGHT: 67 IN | BODY MASS INDEX: 45.99 KG/M2 | HEART RATE: 61 BPM | TEMPERATURE: 99 F | RESPIRATION RATE: 18 BRPM | OXYGEN SATURATION: 97 % | SYSTOLIC BLOOD PRESSURE: 138 MMHG | DIASTOLIC BLOOD PRESSURE: 80 MMHG | WEIGHT: 293 LBS

## 2023-06-21 DIAGNOSIS — Z12.11 SCREENING FOR COLON CANCER: Primary | ICD-10-CM

## 2023-06-21 DIAGNOSIS — F32.A ANXIETY AND DEPRESSION: ICD-10-CM

## 2023-06-21 DIAGNOSIS — F41.9 ANXIETY AND DEPRESSION: ICD-10-CM

## 2023-06-21 PROCEDURE — 3008F PR BODY MASS INDEX (BMI) DOCUMENTED: ICD-10-PCS | Mod: CPTII,S$GLB,, | Performed by: FAMILY MEDICINE

## 2023-06-21 PROCEDURE — 4010F ACE/ARB THERAPY RXD/TAKEN: CPT | Mod: CPTII,S$GLB,, | Performed by: FAMILY MEDICINE

## 2023-06-21 PROCEDURE — 3079F DIAST BP 80-89 MM HG: CPT | Mod: CPTII,S$GLB,, | Performed by: FAMILY MEDICINE

## 2023-06-21 PROCEDURE — 3079F PR MOST RECENT DIASTOLIC BLOOD PRESSURE 80-89 MM HG: ICD-10-PCS | Mod: CPTII,S$GLB,, | Performed by: FAMILY MEDICINE

## 2023-06-21 PROCEDURE — 3075F SYST BP GE 130 - 139MM HG: CPT | Mod: CPTII,S$GLB,, | Performed by: FAMILY MEDICINE

## 2023-06-21 PROCEDURE — 3075F PR MOST RECENT SYSTOLIC BLOOD PRESS GE 130-139MM HG: ICD-10-PCS | Mod: CPTII,S$GLB,, | Performed by: FAMILY MEDICINE

## 2023-06-21 PROCEDURE — 99396 PR PREVENTIVE VISIT,EST,40-64: ICD-10-PCS | Mod: S$GLB,,, | Performed by: FAMILY MEDICINE

## 2023-06-21 PROCEDURE — 99999 PR PBB SHADOW E&M-EST. PATIENT-LVL IV: CPT | Mod: PBBFAC,,, | Performed by: FAMILY MEDICINE

## 2023-06-21 PROCEDURE — 99396 PREV VISIT EST AGE 40-64: CPT | Mod: S$GLB,,, | Performed by: FAMILY MEDICINE

## 2023-06-21 PROCEDURE — 99999 PR PBB SHADOW E&M-EST. PATIENT-LVL IV: ICD-10-PCS | Mod: PBBFAC,,, | Performed by: FAMILY MEDICINE

## 2023-06-21 PROCEDURE — 3044F HG A1C LEVEL LT 7.0%: CPT | Mod: CPTII,S$GLB,, | Performed by: FAMILY MEDICINE

## 2023-06-21 PROCEDURE — 1159F PR MEDICATION LIST DOCUMENTED IN MEDICAL RECORD: ICD-10-PCS | Mod: CPTII,S$GLB,, | Performed by: FAMILY MEDICINE

## 2023-06-21 PROCEDURE — 3044F PR MOST RECENT HEMOGLOBIN A1C LEVEL <7.0%: ICD-10-PCS | Mod: CPTII,S$GLB,, | Performed by: FAMILY MEDICINE

## 2023-06-21 PROCEDURE — 3072F PR LOW RISK FOR RETINOPATHY: ICD-10-PCS | Mod: CPTII,S$GLB,, | Performed by: FAMILY MEDICINE

## 2023-06-21 PROCEDURE — 3072F LOW RISK FOR RETINOPATHY: CPT | Mod: CPTII,S$GLB,, | Performed by: FAMILY MEDICINE

## 2023-06-21 PROCEDURE — 3008F BODY MASS INDEX DOCD: CPT | Mod: CPTII,S$GLB,, | Performed by: FAMILY MEDICINE

## 2023-06-21 PROCEDURE — 4010F PR ACE/ARB THEARPY RXD/TAKEN: ICD-10-PCS | Mod: CPTII,S$GLB,, | Performed by: FAMILY MEDICINE

## 2023-06-21 PROCEDURE — 1159F MED LIST DOCD IN RCRD: CPT | Mod: CPTII,S$GLB,, | Performed by: FAMILY MEDICINE

## 2023-06-21 RX ORDER — ESCITALOPRAM OXALATE 20 MG/1
20 TABLET ORAL DAILY
Qty: 90 TABLET | Refills: 1 | Status: SHIPPED | OUTPATIENT
Start: 2023-06-21 | End: 2023-12-11

## 2023-06-21 NOTE — PROGRESS NOTES
Subjective:   Patient ID: Lavinia Herrera is a 48 y.o. female     Chief Complaint:Follow-up (3 month)      Here for checkup    Follow-up  Pertinent negatives include no abdominal pain, arthralgias, chest pain, chills, coughing, fever, headaches, sore throat or weakness.   Review of Systems   Constitutional:  Negative for chills and fever.   HENT:  Negative for sore throat and trouble swallowing.    Respiratory:  Negative for cough and shortness of breath.    Cardiovascular:  Negative for chest pain and leg swelling.   Gastrointestinal:  Negative for abdominal distention and abdominal pain.   Genitourinary:  Negative for dysuria and flank pain.   Musculoskeletal:  Negative for arthralgias and back pain.   Skin:  Negative for color change and pallor.   Neurological:  Negative for weakness and headaches.   Psychiatric/Behavioral:  Negative for agitation and confusion.    Past Medical History:   Diagnosis Date    Allergy     Diabetes mellitus, type 2     GERD (gastroesophageal reflux disease)     Hypothyroidism     Morbid obesity     Otitis media     Seasonal allergies     Sleep apnea      Past Surgical History:   Procedure Laterality Date    ENDOSCOPIC ULTRASOUND OF UPPER GASTROINTESTINAL TRACT N/A 3/3/2023    Procedure: ULTRASOUND, UPPER GI TRACT, ENDOSCOPIC;  Surgeon: Dc Riddle III, MD;  Location: [facility];  Service: Endoscopy;  Laterality: N/A;    ERCP N/A 3/3/2023    Procedure: ERCP (ENDOSCOPIC RETROGRADE CHOLANGIOPANCREATOGRAPHY);  Surgeon: Dc Riddle III, MD;  Location: [facility] ENDO;  Service: Endoscopy;  Laterality: N/A;    ERCP N/A 3/7/2023    Procedure: ERCP (ENDOSCOPIC RETROGRADE CHOLANGIOPANCREATOGRAPHY);  Surgeon: Dc Riddle III, MD;  Location: [facility] ENDO;  Service: Endoscopy;  Laterality: N/A;    LAPAROSCOPIC CHOLECYSTECTOMY N/A 3/3/2023    Procedure: CHOLECYSTECTOMY, LAPAROSCOPIC;  Surgeon: Cade Elkins Jr., MD;  Location: [facility];  Service: General;  Laterality:  N/A;    MANDIBLE OSTEOTOMY      TYMPANOSTOMY TUBE PLACEMENT  6/30/14     Objective:     Vitals:    06/21/23 1259   BP: 138/80   Pulse: 61   Resp: 18   Temp: 98.5 °F (36.9 °C)     Body mass index is 62.98 kg/m².  Physical Exam  Vitals and nursing note reviewed.   Constitutional:       Appearance: She is well-developed.   HENT:      Head: Normocephalic and atraumatic.   Eyes:      General: No scleral icterus.     Conjunctiva/sclera: Conjunctivae normal.   Cardiovascular:      Heart sounds: No murmur heard.  Pulmonary:      Effort: Pulmonary effort is normal. No respiratory distress.   Musculoskeletal:         General: No deformity. Normal range of motion.      Cervical back: Normal range of motion and neck supple.   Skin:     Coloration: Skin is not pale.      Findings: No rash.   Neurological:      Mental Status: She is alert and oriented to person, place, and time.   Psychiatric:         Behavior: Behavior normal.         Thought Content: Thought content normal.         Judgment: Judgment normal.     Assessment:     1. Screening for colon cancer    2. Anxiety and depression      Plan:   Screening for colon cancer  -     Cologuard Screening (Multitarget Stool DNA); Future; Expected date: 06/21/2023    Anxiety and depression  -     EScitalopram oxalate (LEXAPRO) 20 MG tablet; Take 1 tablet (20 mg total) by mouth once daily.  Dispense: 90 tablet; Refill: 1            Established patient with me has been instructed that must see me at least 1 time yearly (every 365 days) for refills of medications. Seeing other providers in this clinic is fine but expectation is to see me yearly.    Oniel Malik MD  06/21/2023    Portions of this note have been dictated with LINDA Robles

## 2023-06-21 NOTE — PATIENT INSTRUCTIONS
Manuel Kate,     If you are due for any health screening(s) below please notify me so we can arrange them to be ordered and scheduled to maintain your health. Most healthy patients complete it. Don't lose out on improving your health.     Tests to Keep You Healthy    Mammogram: SCHEDULED  Eye Exam: Met on 8/29/2022  Colon Cancer Screening: ORDERED  Cervical Cancer Screening: Met on 3/25/2021  Last HbA1c < 8 (03/17/2023): Yes      Breast Cancer Screening    Breast cancer is the second most common cancer in women after skin cancer, and the second leading cause of death from cancer after lung cancer. Mammograms can detect breast cancer early, which significantly increases the chances of curing the cancer.      A screening mammogram is an x-ray image of the breasts used for early breast cancer detection. It can help reduce the number of deaths from breast cancer among women. To get a clear image, the breast is placed between two plastic plates to make it flat. How often a mammogram is needed depends on your age and your breast cancer risk.    Colon Cancer Screening    Of cancers affecting both men and women, colorectal cancer is the third leading cancer killer in the [place]. But it doesnt have to be. Screening can prevent colorectal cancer or find it at an early stage when treatment often leads to a cure.    A colonoscopy is the preferred test for detecting colon cancer. It is needed only once every 10 years if results are negative. While sedated, a flexible, lighted tube with a tiny camera is inserted into the rectum and advanced through the colon to look for cancers. An alternative screening test that is used at home and returned to the lab may also be used. It detects hidden blood in bowel movements which could indicate cancer in the colon. If results are positive, you will need a colonoscopy to determine if the blood is a sign of cancer. This type of follow up (diagnostic) colonoscopy usually requires  additional copays as required by your insurance provider. Please contact your PCP if you have any questions.

## 2023-06-22 ENCOUNTER — HOSPITAL ENCOUNTER (OUTPATIENT)
Dept: RADIOLOGY | Facility: CLINIC | Age: 48
Discharge: HOME | End: 2023-06-22
Attending: PHYSICIAN ASSISTANT
Payer: COMMERCIAL

## 2023-06-22 DIAGNOSIS — Z12.31 ENCOUNTER FOR SCREENING MAMMOGRAM FOR MALIGNANT NEOPLASM OF BREAST: ICD-10-CM

## 2023-06-22 PROCEDURE — 77067 MAMMO DIGITAL SCREENING BILAT WITH TOMO: ICD-10-PCS | Mod: 26,,, | Performed by: RADIOLOGY

## 2023-06-22 PROCEDURE — 77063 BREAST TOMOSYNTHESIS BI: CPT | Mod: 26,,, | Performed by: RADIOLOGY

## 2023-06-22 PROCEDURE — 77067 SCR MAMMO BI INCL CAD: CPT | Mod: TC,PO

## 2023-06-22 PROCEDURE — 77063 MAMMO DIGITAL SCREENING BILAT WITH TOMO: ICD-10-PCS | Mod: 26,,, | Performed by: RADIOLOGY

## 2023-06-22 PROCEDURE — 77067 SCR MAMMO BI INCL CAD: CPT | Mod: 26,,, | Performed by: RADIOLOGY

## 2023-07-12 ENCOUNTER — HOSPITAL ENCOUNTER (OUTPATIENT)
Dept: RADIOLOGY | Facility: HOSPITAL | Age: 48
Discharge: HOME | End: 2023-07-12
Attending: PHYSICIAN ASSISTANT
Payer: COMMERCIAL

## 2023-07-12 DIAGNOSIS — R92.8 ABNORMAL MAMMOGRAM: ICD-10-CM

## 2023-07-12 PROCEDURE — 77061 MAMMO DIGITAL DIAGNOSTIC LEFT WITH TOMO: ICD-10-PCS | Mod: 26,LT,, | Performed by: RADIOLOGY

## 2023-07-12 PROCEDURE — 77065 DX MAMMO INCL CAD UNI: CPT | Mod: TC,LT

## 2023-07-12 PROCEDURE — 77061 BREAST TOMOSYNTHESIS UNI: CPT | Mod: 26,LT,, | Performed by: RADIOLOGY

## 2023-07-12 PROCEDURE — 77065 MAMMO DIGITAL DIAGNOSTIC LEFT WITH TOMO: ICD-10-PCS | Mod: 26,LT,, | Performed by: RADIOLOGY

## 2023-07-12 PROCEDURE — 77065 DX MAMMO INCL CAD UNI: CPT | Mod: 26,LT,, | Performed by: RADIOLOGY

## 2023-07-25 ENCOUNTER — HOSPITAL ENCOUNTER (OUTPATIENT)
Dept: RADIOLOGY | Facility: HOSPITAL | Age: 48
Discharge: HOME | End: 2023-07-25
Attending: PHYSICIAN ASSISTANT
Payer: COMMERCIAL

## 2023-07-25 DIAGNOSIS — R92.8 ABNORMAL MAMMOGRAM OF LEFT BREAST: ICD-10-CM

## 2023-07-25 PROCEDURE — 19081 BX BREAST 1ST LESION STRTCTC: CPT | Mod: LT,,, | Performed by: RADIOLOGY

## 2023-07-25 PROCEDURE — 76098 X-RAY EXAM SURGICAL SPECIMEN: CPT | Mod: 26,59,, | Performed by: RADIOLOGY

## 2023-07-25 PROCEDURE — 19081 MAMMO BREAST STEREOTACTIC BREAST BIOPSY LEFT: ICD-10-PCS | Mod: LT,,, | Performed by: RADIOLOGY

## 2023-07-25 PROCEDURE — 25000003 PHARM REV CODE 250: Performed by: PHYSICIAN ASSISTANT

## 2023-07-25 PROCEDURE — 76098 MAMMO BREAST SPECIMEN: ICD-10-PCS | Mod: 26,59,, | Performed by: RADIOLOGY

## 2023-07-25 PROCEDURE — 76098 X-RAY EXAM SURGICAL SPECIMEN: CPT | Mod: TC

## 2023-07-25 PROCEDURE — A4648 IMPLANTABLE TISSUE MARKER: HCPCS

## 2023-07-25 RX ADMIN — LIDOCAINE HYDROCHLORIDE 30 ML: 10; .005 INJECTION, SOLUTION EPIDURAL; INFILTRATION; INTRACAUDAL; PERINEURAL at 02:07

## 2023-07-28 ENCOUNTER — PATIENT MESSAGE (OUTPATIENT)
Dept: FAMILY MEDICINE | Facility: CLINIC | Age: 48
End: 2023-07-28
Payer: COMMERCIAL

## 2023-07-28 DIAGNOSIS — N63.0 MASS OF BREAST, UNSPECIFIED LATERALITY: Primary | ICD-10-CM

## 2023-08-02 ENCOUNTER — HOSPITAL ENCOUNTER (OUTPATIENT)
Dept: RADIOLOGY | Facility: HOSPITAL | Age: 48
Discharge: HOME | End: 2023-08-02
Attending: PHYSICIAN ASSISTANT
Payer: COMMERCIAL

## 2023-08-02 DIAGNOSIS — E04.1 NODULAR THYROID DISEASE: ICD-10-CM

## 2023-08-02 PROCEDURE — 76536 US EXAM OF HEAD AND NECK: CPT | Mod: 26,,, | Performed by: RADIOLOGY

## 2023-08-02 PROCEDURE — 76536 US SOFT TISSUE HEAD NECK THYROID: ICD-10-PCS | Mod: 26,,, | Performed by: RADIOLOGY

## 2023-08-02 PROCEDURE — 76536 US EXAM OF HEAD AND NECK: CPT | Mod: TC

## 2023-08-09 ENCOUNTER — TELEPHONE (OUTPATIENT)
Dept: FAMILY MEDICINE | Facility: CLINIC | Age: 48
End: 2023-08-09
Payer: COMMERCIAL

## 2023-08-09 ENCOUNTER — OFFICE VISIT (OUTPATIENT)
Dept: ENDOCRINOLOGY | Facility: CLINIC | Age: 48
End: 2023-08-09
Payer: COMMERCIAL

## 2023-08-09 VITALS
BODY MASS INDEX: 45.99 KG/M2 | HEIGHT: 67 IN | TEMPERATURE: 98 F | DIASTOLIC BLOOD PRESSURE: 82 MMHG | SYSTOLIC BLOOD PRESSURE: 130 MMHG | OXYGEN SATURATION: 96 % | HEART RATE: 72 BPM | WEIGHT: 293 LBS

## 2023-08-09 DIAGNOSIS — E11.69 TYPE 2 DIABETES MELLITUS WITH OTHER SPECIFIED COMPLICATION, WITHOUT LONG-TERM CURRENT USE OF INSULIN: Primary | ICD-10-CM

## 2023-08-09 DIAGNOSIS — E03.8 HYPOTHYROIDISM DUE TO HASHIMOTO'S THYROIDITIS: ICD-10-CM

## 2023-08-09 DIAGNOSIS — E78.5 HYPERLIPIDEMIA, UNSPECIFIED HYPERLIPIDEMIA TYPE: ICD-10-CM

## 2023-08-09 DIAGNOSIS — E66.01 MORBID OBESITY WITH BMI OF 60.0-69.9, ADULT: ICD-10-CM

## 2023-08-09 DIAGNOSIS — E06.3 HYPOTHYROIDISM DUE TO HASHIMOTO'S THYROIDITIS: ICD-10-CM

## 2023-08-09 DIAGNOSIS — E04.1 NODULAR THYROID DISEASE: ICD-10-CM

## 2023-08-09 DIAGNOSIS — E61.1 IRON DEFICIENCY: ICD-10-CM

## 2023-08-09 PROCEDURE — 99999 PR PBB SHADOW E&M-EST. PATIENT-LVL IV: CPT | Mod: PBBFAC,,, | Performed by: PHYSICIAN ASSISTANT

## 2023-08-09 PROCEDURE — 3079F PR MOST RECENT DIASTOLIC BLOOD PRESSURE 80-89 MM HG: ICD-10-PCS | Mod: CPTII,S$GLB,, | Performed by: PHYSICIAN ASSISTANT

## 2023-08-09 PROCEDURE — 3072F PR LOW RISK FOR RETINOPATHY: ICD-10-PCS | Mod: CPTII,S$GLB,, | Performed by: PHYSICIAN ASSISTANT

## 2023-08-09 PROCEDURE — 3075F SYST BP GE 130 - 139MM HG: CPT | Mod: CPTII,S$GLB,, | Performed by: PHYSICIAN ASSISTANT

## 2023-08-09 PROCEDURE — 3008F PR BODY MASS INDEX (BMI) DOCUMENTED: ICD-10-PCS | Mod: CPTII,S$GLB,, | Performed by: PHYSICIAN ASSISTANT

## 2023-08-09 PROCEDURE — 4010F PR ACE/ARB THEARPY RXD/TAKEN: ICD-10-PCS | Mod: CPTII,S$GLB,, | Performed by: PHYSICIAN ASSISTANT

## 2023-08-09 PROCEDURE — 99213 OFFICE O/P EST LOW 20 MIN: CPT | Mod: S$GLB,,, | Performed by: PHYSICIAN ASSISTANT

## 2023-08-09 PROCEDURE — 1159F PR MEDICATION LIST DOCUMENTED IN MEDICAL RECORD: ICD-10-PCS | Mod: CPTII,S$GLB,, | Performed by: PHYSICIAN ASSISTANT

## 2023-08-09 PROCEDURE — 3075F PR MOST RECENT SYSTOLIC BLOOD PRESS GE 130-139MM HG: ICD-10-PCS | Mod: CPTII,S$GLB,, | Performed by: PHYSICIAN ASSISTANT

## 2023-08-09 PROCEDURE — 3044F HG A1C LEVEL LT 7.0%: CPT | Mod: CPTII,S$GLB,, | Performed by: PHYSICIAN ASSISTANT

## 2023-08-09 PROCEDURE — 1160F PR REVIEW ALL MEDS BY PRESCRIBER/CLIN PHARMACIST DOCUMENTED: ICD-10-PCS | Mod: CPTII,S$GLB,, | Performed by: PHYSICIAN ASSISTANT

## 2023-08-09 PROCEDURE — 99213 PR OFFICE/OUTPT VISIT, EST, LEVL III, 20-29 MIN: ICD-10-PCS | Mod: S$GLB,,, | Performed by: PHYSICIAN ASSISTANT

## 2023-08-09 PROCEDURE — 3072F LOW RISK FOR RETINOPATHY: CPT | Mod: CPTII,S$GLB,, | Performed by: PHYSICIAN ASSISTANT

## 2023-08-09 PROCEDURE — 1159F MED LIST DOCD IN RCRD: CPT | Mod: CPTII,S$GLB,, | Performed by: PHYSICIAN ASSISTANT

## 2023-08-09 PROCEDURE — 3008F BODY MASS INDEX DOCD: CPT | Mod: CPTII,S$GLB,, | Performed by: PHYSICIAN ASSISTANT

## 2023-08-09 PROCEDURE — 99999 PR PBB SHADOW E&M-EST. PATIENT-LVL IV: ICD-10-PCS | Mod: PBBFAC,,, | Performed by: PHYSICIAN ASSISTANT

## 2023-08-09 PROCEDURE — 3079F DIAST BP 80-89 MM HG: CPT | Mod: CPTII,S$GLB,, | Performed by: PHYSICIAN ASSISTANT

## 2023-08-09 PROCEDURE — 3044F PR MOST RECENT HEMOGLOBIN A1C LEVEL <7.0%: ICD-10-PCS | Mod: CPTII,S$GLB,, | Performed by: PHYSICIAN ASSISTANT

## 2023-08-09 PROCEDURE — 4010F ACE/ARB THERAPY RXD/TAKEN: CPT | Mod: CPTII,S$GLB,, | Performed by: PHYSICIAN ASSISTANT

## 2023-08-09 PROCEDURE — 1160F RVW MEDS BY RX/DR IN RCRD: CPT | Mod: CPTII,S$GLB,, | Performed by: PHYSICIAN ASSISTANT

## 2023-08-09 NOTE — PROGRESS NOTES
CC: This 48 y.o. female presents for management of Diabetes Mellitus  and chronic conditions pending review including HTN, HLP.    HPI: was diagnosed with T2DM in 2017. Her son  from suicide in 10/21. Pt was admitted for pancreatitis and gallstones in 3/23.  Has never been hospitalized r/t DM.  Family hx of DM: grandmother  Fhx of thyroid disease:her sister has Hashimoto's Disease  Denies missing doses of DM medication.   hypoglycemia at home: none   monitoring BG at home:  Fastins    Pt was started on Lipitor last visit but experienced side effects. Pt had a cholecystectomy earlier this month.   She could not remember the side effect.    Diet: 3 meals daily.   3 meals daily.  No snacks. Avoids sugary beverages. Drinks protein shake in the morning.    Exercise: Walking and doing squats a few times weekly    CURRENT DM MEDS: Mounjaro 5 mg weekly     Previous meds:  Metformin-constipation    Standards of Care:  Eye exam:  Dr. [facility]-macular degeneration  Podiatry exam:  me  DE: 10/21 [place]    Hypothyroidism  Taking 100 mcg qd.   Dx in .   No hx of neck radiation.   + fatigue, cold intolerance, sweating, occ constipation.   No hair loss, heat intolerance, tremors. Cycles are irregular. She does have a mirena.    Nodular Thyroid Disease  Thyroid u/s  shows a 2.3 cm nodule in the left thyroid and a 1.6 cm nodule in the rt thyroid.  No sob, dysphagia or voice changes. FNAs of the right middle nodule and left upper nodule resulted in FLUS.     She is taking vitamin d and zinc.  PMHx, PSHx: reviewed in epic.  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    Wt Readings from Last 10 Encounters:   23 (!) 180.5 kg (397 lb 14.9 oz)   23 (!) 182.4 kg (402 lb 1.9 oz)   23 (!) 184.6 kg (407 lb 1.3 oz)   23 (!) 185.9 kg (409 lb 13.4 oz)   23 (!) 185.9 kg (409 lb 13.4 oz)   23 (!) 201.7 kg (444 lb 11.2 oz)   22 (!) 199.5 kg (439 lb 13.1 oz)  "  07/08/22 (!) 191 kg (421 lb 1.3 oz)   03/09/22 (!) 189.5 kg (417 lb 12.4 oz)   01/03/22 (!) 187.3 kg (412 lb 14.8 oz)      ROS:   Gen: Appetite good, + wt loss (12 lbs), denies fatigue and weakness.  Skin: Skin is intact and heals well, no rashes, no hair changes  Eyes: Denies visual disturbances  Resp: no SOB or ANGELES, no cough  Cardiac: No palpitations, chest pain, no edema   GI: No nausea or vomiting, diarrhea, constipation, or abdominal pain.  /GYN: No nocturia, burning or pain.   MS/Neuro: Denies numbness/ tingling in BLE; Gait steady, speech clear  Psych: Denies drug/ETOH abuse, no hx of depression.  Other systems: negative.    /82 (BP Location: Left arm, Patient Position: Sitting, BP Method: Large (Manual))   Pulse 72   Temp 98.4 °F (36.9 °C) (Oral)   Ht 5' 7" (1.702 m)   Wt (!) 180.5 kg (397 lb 14.9 oz)   SpO2 96%   BMI 62.32 kg/m²      PE:  GENERAL: middle aged female, well developed, well nourished.  PSYCH: AAOx3, appropriate mood and affect, pleasant expression, conversant, appears relaxed, well groomed.   EYES: Conjunctiva, corneas clear  NECK: Supple,  + AN  CHEST: Resp even and unlabored,   NEURO: Gait steady, CN ll-Xll grossly intact.  SKIN: Skin warm and dry no acanthosis nigracans.  8/23  Foot Exam: no sores or macerations noted.     Protective Sensation (w/ 10 gram monofilament):  Right: Intact  Left: Intact    Visual Inspection:  Normal -  Bilateral and Nails Intact - without Evidence of Foot Deformity- Bilateral    Pedal Pulses:   Right: Present  Left: Present    Posterior tibialis:   Right:Present  Left: Present     Vibratory Sensation  Right:Positive  Left:Positive     Personally reviewed labs below:    Lab Visit on 08/02/2023   Component Date Value Ref Range Status    Free T4 08/02/2023 0.94  0.71 - 1.51 ng/dL Final    TSH 08/02/2023 2.097  0.400 - 4.000 uIU/mL Final    Hemoglobin A1C 08/02/2023 6.9 (H)  4.5 - 6.2 % Final    Comment: According to ADA guidelines, hemoglobin A1C " <7.0% represents  optimal control in non-pregnant diabetic patients.  Different  metrics may apply to specific populations.   Standards of Medical Care in Diabetes - 2016.    For the purpose of screening for the presence of diabetes:  <5.7%     Consistent with the absence of diabetes  5.7-6.4%  Consistent with increasing risk for diabetes   (prediabetes)  >or=6.5%  Consistent with diabetes    Currently no consensus exists for use of hemoglobin A1C  for diagnosis of diabetes for children.      Estimated Avg Glucose 08/02/2023 151 (H)  68 - 131 mg/dL Final    EXAMINATION:  US SOFT TISSUE HEAD NECK THYROID     CLINICAL HISTORY:  Nontoxic single thyroid nodule     TECHNIQUE:  Ultrasound of the thyroid and cervical lymph nodes was performed.     COMPARISON:  Thyroid ultrasound of February 23, 2022     FINDINGS:  The right lobe measures 6.2 x 2.7 x 3.3 cm for a calculated volume of 29 cc.  The left lobe measures 6 x 3 x 3 cm for a calculated volume of 28 cc and a total thyroid volume of 57 cc.     On today's study on the right there is a 1 cm, a 1.6 cm and a 1.3 cm solid nodule of the right thyroid.  On the left there is a 2.2 cm and a 1.7 cm nodule of the left thyroid.  The patient has undergone bilateral fine-needle aspiration in March 2, 2022.     Impression:     Multinodular goiter.  Enlarging nodules are not seen        Electronically signed by: Yazan Madison MD  Date:                                            08/10/2022  Time:                                           11:16     ASSESSMENT and PLAN:    1. Type 2 diabetes mellitus with other specified complication, without long-term current use of insulin        2. Hyperlipidemia, unspecified hyperlipidemia type        3. Hypothyroidism due to Hashimoto's thyroiditis        4. Nodular thyroid disease        5. Iron deficiency        6. Morbid obesity with BMI of 60.0-69.9, adult           T2DM with hyperglycemia-  A1c is below goal.   Increase mounjaro to 7.5 mg q  weekly.  She did not tolerate Metformin in the past.   Discussed DM, progression of disease, long term complications, tx options.   Discussed A1c and BG goals.   Reviewed  hypoglycemia, s/s and appropriate tx.   Instructed to monitor BG and bring meter/ log to every clinic visit.   - takes ASA, ACEi,     HLP -stable LDL ,  LFTs WNL. Continue zetia 10 mg daily. Declines statins.  Hypothyroidism-TSH is stable. Continue Levothyroxine to 100 mcg daily. Continue Selenium 200 mcg qd.  Nodular Thyroid disease-pt declines bx. Pt wants to follow up w/ u/s. FNAs showed FLUS. Thyroid nodules are similar in size to previous exam.  Iron deficiency-may falsely lower a1c.  Obesity-Body mass index is 62.32 kg/m². Increase exercise to 30 min daily. Decrease carbohydrates in diet.     Referral to optho  Follow-up: in 6months with lab prior

## 2023-08-18 ENCOUNTER — PATIENT MESSAGE (OUTPATIENT)
Dept: ENDOCRINOLOGY | Facility: CLINIC | Age: 48
End: 2023-08-18
Payer: COMMERCIAL

## 2023-08-21 DIAGNOSIS — K21.9 GASTROESOPHAGEAL REFLUX DISEASE, UNSPECIFIED WHETHER ESOPHAGITIS PRESENT: ICD-10-CM

## 2023-08-21 RX ORDER — PANTOPRAZOLE SODIUM 40 MG/1
40 TABLET, DELAYED RELEASE ORAL DAILY
Qty: 90 TABLET | Refills: 1 | Status: SHIPPED | OUTPATIENT
Start: 2023-08-21

## 2023-09-08 DIAGNOSIS — E11.69 TYPE 2 DIABETES MELLITUS WITH OTHER SPECIFIED COMPLICATION, WITHOUT LONG-TERM CURRENT USE OF INSULIN: Primary | ICD-10-CM

## 2023-09-08 RX ORDER — TIRZEPATIDE 7.5 MG/.5ML
7.5 INJECTION, SOLUTION SUBCUTANEOUS
Qty: 4 PEN | Refills: 0 | Status: CANCELLED | OUTPATIENT
Start: 2023-09-08

## 2023-10-03 DIAGNOSIS — E11.69 TYPE 2 DIABETES MELLITUS WITH OTHER SPECIFIED COMPLICATION, WITHOUT LONG-TERM CURRENT USE OF INSULIN: ICD-10-CM

## 2023-10-03 RX ORDER — TIRZEPATIDE 10 MG/.5ML
10 INJECTION, SOLUTION SUBCUTANEOUS
Qty: 4 PEN | Refills: 11 | Status: CANCELLED | OUTPATIENT
Start: 2023-10-03

## 2023-10-04 RX ORDER — TIRZEPATIDE 12.5 MG/.5ML
12.5 INJECTION, SOLUTION SUBCUTANEOUS
Qty: 4 PEN | Refills: 11 | Status: SHIPPED | OUTPATIENT
Start: 2023-10-04 | End: 2024-04-03

## 2023-11-16 DIAGNOSIS — E11.69 TYPE 2 DIABETES MELLITUS WITH OTHER SPECIFIED COMPLICATION, WITHOUT LONG-TERM CURRENT USE OF INSULIN: ICD-10-CM

## 2023-11-17 RX ORDER — LEVOTHYROXINE SODIUM 100 UG/1
100 TABLET ORAL
Qty: 30 TABLET | Refills: 11 | Status: SHIPPED | OUTPATIENT
Start: 2023-11-17 | End: 2024-11-16

## 2023-12-11 DIAGNOSIS — F32.A ANXIETY AND DEPRESSION: ICD-10-CM

## 2023-12-11 DIAGNOSIS — F41.9 ANXIETY AND DEPRESSION: ICD-10-CM

## 2023-12-11 RX ORDER — ESCITALOPRAM OXALATE 20 MG/1
20 TABLET ORAL
Qty: 90 TABLET | Refills: 1 | Status: SHIPPED | OUTPATIENT
Start: 2023-12-11

## 2023-12-11 NOTE — TELEPHONE ENCOUNTER
Refill Decision Note   Lavinia Herrera  is requesting a refill authorization.  Brief Assessment and Rationale for Refill:  Approve     Medication Therapy Plan:         Comments:     Note composed:1:14 PM 12/11/2023             Appointments     Last Visit   6/21/2023 Oniel Malik MD   Next Visit   1/2/2024 Oniel Malik MD

## 2023-12-11 NOTE — TELEPHONE ENCOUNTER
No care due was identified.  [facility] Embedded Care Due Messages. Reference number: [id number].   12/11/2023 5:54:00 AM CST

## 2023-12-18 ENCOUNTER — OFFICE VISIT (OUTPATIENT)
Dept: OPHTHALMOLOGY | Facility: CLINIC | Age: 48
End: 2023-12-18
Payer: COMMERCIAL

## 2023-12-18 DIAGNOSIS — H35.363 MACULAR DRUSEN, BILATERAL: ICD-10-CM

## 2023-12-18 DIAGNOSIS — H40.013 OAG (OPEN ANGLE GLAUCOMA) SUSPECT, LOW RISK, BILATERAL: ICD-10-CM

## 2023-12-18 DIAGNOSIS — E11.9 TYPE 2 DIABETES MELLITUS WITHOUT RETINOPATHY: Primary | ICD-10-CM

## 2023-12-18 PROCEDURE — 3044F PR MOST RECENT HEMOGLOBIN A1C LEVEL <7.0%: ICD-10-PCS | Mod: CPTII,S$GLB,, | Performed by: OPHTHALMOLOGY

## 2023-12-18 PROCEDURE — 76514 ECHO EXAM OF EYE THICKNESS: CPT | Mod: S$GLB,,, | Performed by: OPHTHALMOLOGY

## 2023-12-18 PROCEDURE — 92014 PR EYE EXAM, EST PATIENT,COMPREHESV: ICD-10-PCS | Mod: S$GLB,,, | Performed by: OPHTHALMOLOGY

## 2023-12-18 PROCEDURE — 1160F RVW MEDS BY RX/DR IN RCRD: CPT | Mod: CPTII,S$GLB,, | Performed by: OPHTHALMOLOGY

## 2023-12-18 PROCEDURE — 1160F PR REVIEW ALL MEDS BY PRESCRIBER/CLIN PHARMACIST DOCUMENTED: ICD-10-PCS | Mod: CPTII,S$GLB,, | Performed by: OPHTHALMOLOGY

## 2023-12-18 PROCEDURE — 76514 PR  US, EYE, FOR CORNEAL THICKNESS: ICD-10-PCS | Mod: S$GLB,,, | Performed by: OPHTHALMOLOGY

## 2023-12-18 PROCEDURE — 2023F DILAT RTA XM W/O RTNOPTHY: CPT | Mod: CPTII,S$GLB,, | Performed by: OPHTHALMOLOGY

## 2023-12-18 PROCEDURE — 1159F PR MEDICATION LIST DOCUMENTED IN MEDICAL RECORD: ICD-10-PCS | Mod: CPTII,S$GLB,, | Performed by: OPHTHALMOLOGY

## 2023-12-18 PROCEDURE — 92014 COMPRE OPH EXAM EST PT 1/>: CPT | Mod: S$GLB,,, | Performed by: OPHTHALMOLOGY

## 2023-12-18 PROCEDURE — 4010F ACE/ARB THERAPY RXD/TAKEN: CPT | Mod: CPTII,S$GLB,, | Performed by: OPHTHALMOLOGY

## 2023-12-18 PROCEDURE — 1159F MED LIST DOCD IN RCRD: CPT | Mod: CPTII,S$GLB,, | Performed by: OPHTHALMOLOGY

## 2023-12-18 PROCEDURE — 92133 POSTERIOR SEGMENT OCT OPTIC NERVE(OCULAR COHERENCE TOMOGRAPHY) - OU - BOTH EYES: ICD-10-PCS | Mod: S$GLB,,, | Performed by: OPHTHALMOLOGY

## 2023-12-18 PROCEDURE — 3044F HG A1C LEVEL LT 7.0%: CPT | Mod: CPTII,S$GLB,, | Performed by: OPHTHALMOLOGY

## 2023-12-18 PROCEDURE — 2023F PR DILATED RETINAL EXAM W/O EVID OF RETINOPATHY: ICD-10-PCS | Mod: CPTII,S$GLB,, | Performed by: OPHTHALMOLOGY

## 2023-12-18 PROCEDURE — 92133 CPTRZD OPH DX IMG PST SGM ON: CPT | Mod: S$GLB,,, | Performed by: OPHTHALMOLOGY

## 2023-12-18 PROCEDURE — 99999 PR PBB SHADOW E&M-EST. PATIENT-LVL III: CPT | Mod: PBBFAC,,, | Performed by: OPHTHALMOLOGY

## 2023-12-18 PROCEDURE — 4010F PR ACE/ARB THEARPY RXD/TAKEN: ICD-10-PCS | Mod: CPTII,S$GLB,, | Performed by: OPHTHALMOLOGY

## 2023-12-18 PROCEDURE — 99999 PR PBB SHADOW E&M-EST. PATIENT-LVL III: ICD-10-PCS | Mod: PBBFAC,,, | Performed by: OPHTHALMOLOGY

## 2023-12-18 NOTE — PROGRESS NOTES
HPI    Pt presents for yearly Dm exam     States dry eyes.     Does not use ATS consistently. States sleeps with CPAP and finds it drys   her eyes out the most. Dm is stable    Hemoglobin A1C       Date                     Value               Ref Range             Status                08/02/2023               6.9 (H)             4.5 - 6.2 %           Final                      03/17/2023               6.8 (H)             4.0 - 5.6 %           Final                     11/02/2022               7.1 (H)             4.0 - 5.6 %           Final                Last edited by Zaria Abarca on 12/18/2023  2:12 PM.            Assessment /Plan     For exam results, see Encounter Report.    Type 2 diabetes mellitus without retinopathy  -     Ambulatory referral/consult to Ophthalmology  -     Posterior Segment OCT Optic Nerve- Both eyes    OAG (open angle glaucoma) suspect, low risk, bilateral    Macular drusen, bilateral      1. Type 2 diabetes mellitus without retinopathy  Diabetes without retinopathy, discussed with patient importance of glucose control and follow up.  Patient voices understanding.    2. OAG (open angle glaucoma) suspect, low risk, bilateral  Neg famhx  Avg pachy    ONH suspicious OD  IOP normal  OCT NFL normal    Low risk  F/u 1 year, routine exam with OCT NFL    3. Macular drusen, bilateral  Hard drusen, likely familial  Risk of CNV discussed  AG precautions

## 2024-01-02 ENCOUNTER — OFFICE VISIT (OUTPATIENT)
Dept: FAMILY MEDICINE | Facility: CLINIC | Age: 49
End: 2024-01-02
Payer: COMMERCIAL

## 2024-01-02 VITALS
OXYGEN SATURATION: 100 % | BODY MASS INDEX: 45.99 KG/M2 | DIASTOLIC BLOOD PRESSURE: 60 MMHG | SYSTOLIC BLOOD PRESSURE: 130 MMHG | WEIGHT: 293 LBS | HEART RATE: 60 BPM | HEIGHT: 67 IN | TEMPERATURE: 98 F

## 2024-01-02 DIAGNOSIS — G47.33 OSA (OBSTRUCTIVE SLEEP APNEA): ICD-10-CM

## 2024-01-02 DIAGNOSIS — Z12.11 SCREENING FOR COLON CANCER: Primary | ICD-10-CM

## 2024-01-02 DIAGNOSIS — E66.1 CLASS 3 DRUG-INDUCED OBESITY WITH SERIOUS COMORBIDITY AND BODY MASS INDEX (BMI) OF 50.0 TO 59.9 IN ADULT: ICD-10-CM

## 2024-01-02 DIAGNOSIS — E11.69 TYPE 2 DIABETES MELLITUS WITH OTHER SPECIFIED COMPLICATION, WITHOUT LONG-TERM CURRENT USE OF INSULIN: ICD-10-CM

## 2024-01-02 PROCEDURE — 3008F BODY MASS INDEX DOCD: CPT | Mod: CPTII,S$GLB,, | Performed by: FAMILY MEDICINE

## 2024-01-02 PROCEDURE — 3072F LOW RISK FOR RETINOPATHY: CPT | Mod: CPTII,S$GLB,, | Performed by: FAMILY MEDICINE

## 2024-01-02 PROCEDURE — 99999 PR PBB SHADOW E&M-EST. PATIENT-LVL IV: CPT | Mod: PBBFAC,,, | Performed by: FAMILY MEDICINE

## 2024-01-02 PROCEDURE — 1159F MED LIST DOCD IN RCRD: CPT | Mod: CPTII,S$GLB,, | Performed by: FAMILY MEDICINE

## 2024-01-02 PROCEDURE — 3075F SYST BP GE 130 - 139MM HG: CPT | Mod: CPTII,S$GLB,, | Performed by: FAMILY MEDICINE

## 2024-01-02 PROCEDURE — 3078F DIAST BP <80 MM HG: CPT | Mod: CPTII,S$GLB,, | Performed by: FAMILY MEDICINE

## 2024-01-02 PROCEDURE — 99396 PREV VISIT EST AGE 40-64: CPT | Mod: S$GLB,,, | Performed by: FAMILY MEDICINE

## 2024-01-02 RX ORDER — EMPAGLIFLOZIN 10 MG/1
10 TABLET, FILM COATED ORAL
COMMUNITY
Start: 2023-10-23

## 2024-01-02 NOTE — PATIENT INSTRUCTIONS
Manuel Kate,     If you are due for any health screening(s) below please notify me so we can arrange them to be ordered and scheduled to maintain your health. Most healthy patients complete it. Don't lose out on improving your health.     Tests to Keep You Healthy    Mammogram: Met on 7/12/2023  Eye Exam: Met on 12/18/2023  Colon Cancer Screening: ORDERED  Cervical Cancer Screening: Met on 3/25/2021  Last HbA1c < 8 (08/02/2023): Yes         Colon Cancer Screening    Of cancers affecting both men and women, colorectal cancer is the third leading cancer killer in the [place]. But it doesnt have to be. Screening can prevent colorectal cancer or find it at an early stage when treatment often leads to a cure.    A colonoscopy is the preferred test for detecting colon cancer. It is needed only once every 10 years if results are negative. While sedated, a flexible, lighted tube with a tiny camera is inserted into the rectum and advanced through the colon to look for cancers. An alternative screening test that is used at home and returned to the lab may also be used. It detects hidden blood in bowel movements which could indicate cancer in the colon. If results are positive, you will need a colonoscopy to determine if the blood is a sign of cancer. This type of follow up (diagnostic) colonoscopy usually requires additional copays as required by your insurance provider. Please contact your PCP if you have any questions.

## 2024-01-03 PROBLEM — E11.69 TYPE 2 DIABETES MELLITUS WITH OTHER SPECIFIED COMPLICATION, WITHOUT LONG-TERM CURRENT USE OF INSULIN: Status: ACTIVE | Noted: 2023-03-02

## 2024-01-03 NOTE — PROGRESS NOTES
Subjective:   Patient ID: Lavinia Herrera is a 48 y.o. female     Chief Complaint:Follow-up      Here for checkup. Doing well.    Follow-up  Pertinent negatives include no abdominal pain, arthralgias, chest pain, chills, coughing, fever, headaches, sore throat or weakness.     Review of Systems   Constitutional:  Negative for chills and fever.   HENT:  Negative for sore throat and trouble swallowing.    Respiratory:  Negative for cough and shortness of breath.    Cardiovascular:  Negative for chest pain and leg swelling.   Gastrointestinal:  Negative for abdominal distention and abdominal pain.   Genitourinary:  Negative for dysuria and flank pain.   Musculoskeletal:  Negative for arthralgias and back pain.   Skin:  Negative for color change and pallor.   Neurological:  Negative for weakness and headaches.   Psychiatric/Behavioral:  Negative for agitation and confusion.      Past Medical History:   Diagnosis Date    Allergy     Diabetes mellitus, type 2     GERD (gastroesophageal reflux disease)     Hypothyroidism     Morbid obesity     Otitis media     Seasonal allergies     Sleep apnea      Past Surgical History:   Procedure Laterality Date    ENDOSCOPIC ULTRASOUND OF UPPER GASTROINTESTINAL TRACT N/A 3/3/2023    Procedure: ULTRASOUND, UPPER GI TRACT, ENDOSCOPIC;  Surgeon: Dc Riddle III, MD;  Location: [facility];  Service: Endoscopy;  Laterality: N/A;    ERCP N/A 3/3/2023    Procedure: ERCP (ENDOSCOPIC RETROGRADE CHOLANGIOPANCREATOGRAPHY);  Surgeon: Dc Riddle III, MD;  Location: [facility];  Service: Endoscopy;  Laterality: N/A;    ERCP N/A 3/7/2023    Procedure: ERCP (ENDOSCOPIC RETROGRADE CHOLANGIOPANCREATOGRAPHY);  Surgeon: Dc Riddle III, MD;  Location: [facility] ENDO;  Service: Endoscopy;  Laterality: N/A;    LAPAROSCOPIC CHOLECYSTECTOMY N/A 3/3/2023    Procedure: CHOLECYSTECTOMY, LAPAROSCOPIC;  Surgeon: Cade Elkins Jr., MD;  Location: [facility];  Service:  General;  Laterality: N/A;    MANDIBLE OSTEOTOMY      TYMPANOSTOMY TUBE PLACEMENT  6/30/14     Objective:     Vitals:    01/02/24 1540   BP: 130/60   Pulse: 60   Temp: 97.8 °F (36.6 °C)     Body mass index is 57.32 kg/m².  Physical Exam  Vitals and nursing note reviewed.   Constitutional:       Appearance: She is well-developed.   HENT:      Head: Normocephalic and atraumatic.   Eyes:      General: No scleral icterus.     Conjunctiva/sclera: Conjunctivae normal.   Cardiovascular:      Heart sounds: No murmur heard.  Pulmonary:      Effort: Pulmonary effort is normal. No respiratory distress.   Musculoskeletal:         General: No deformity. Normal range of motion.      Cervical back: Normal range of motion and neck supple.   Skin:     Coloration: Skin is not pale.      Findings: No rash.   Neurological:      Mental Status: She is alert and oriented to person, place, and time.   Psychiatric:         Behavior: Behavior normal.         Thought Content: Thought content normal.         Judgment: Judgment normal.     Assessment:     1. Screening for colon cancer    2. LUCAS (obstructive sleep apnea)    3. Morbid obesity with BMI of 60.0-69.9, adult    4. Type 2 diabetes mellitus with other specified complication, without long-term current use of insulin      Plan:   Screening for colon cancer  -     Cancel: Cologuard Screening (Multitarget Stool DNA); Future; Expected date: 01/02/2024    LUCAS (obstructive sleep apnea)  -     Ambulatory referral/consult to Pulmonology; Future; Expected date: 01/09/2024    Class 3 drug-induced obesity with serious comorbidity and body mass index (BMI) of 50.0 to 59.9 in adult  Counseled. Significant weight loss on mounjaro.  Type 2 diabetes mellitus with other specified complication, without long-term current use of insulin  Stable. Follows with endo      Established patient with me has been instructed that must see me at least 1 time yearly (every 365 days) for refills of medications.  Seeing other providers in this clinic is fine but expectation is to see me yearly.    Oniel Malik MD  01/03/2024    Portions of this note have been dictated with LINDA Denton.

## 2024-01-05 ENCOUNTER — PATIENT MESSAGE (OUTPATIENT)
Dept: ADMINISTRATIVE | Facility: HOSPITAL | Age: 49
End: 2024-01-05
Payer: COMMERCIAL

## 2024-01-09 DIAGNOSIS — M10.9 GOUT, UNSPECIFIED CAUSE, UNSPECIFIED CHRONICITY, UNSPECIFIED SITE: ICD-10-CM

## 2024-01-10 RX ORDER — ALLOPURINOL 100 MG/1
100 TABLET ORAL DAILY
Qty: 30 TABLET | Refills: 11 | Status: SHIPPED | OUTPATIENT
Start: 2024-01-10

## 2024-01-20 DIAGNOSIS — E78.5 HYPERLIPIDEMIA, UNSPECIFIED HYPERLIPIDEMIA TYPE: ICD-10-CM

## 2024-01-22 RX ORDER — EZETIMIBE 10 MG/1
10 TABLET ORAL DAILY
Qty: 90 TABLET | Refills: 3 | Status: SHIPPED | OUTPATIENT
Start: 2024-01-22 | End: 2025-01-21

## 2024-01-25 ENCOUNTER — TELEPHONE (OUTPATIENT)
Dept: ENDOCRINOLOGY | Facility: CLINIC | Age: 49
End: 2024-01-25
Payer: COMMERCIAL

## 2024-02-27 ENCOUNTER — LAB VISIT (OUTPATIENT)
Dept: LAB | Facility: HOSPITAL | Age: 49
End: 2024-02-27
Attending: PHYSICIAN ASSISTANT
Payer: COMMERCIAL

## 2024-02-27 DIAGNOSIS — E11.69 TYPE 2 DIABETES MELLITUS WITH OTHER SPECIFIED COMPLICATION, WITHOUT LONG-TERM CURRENT USE OF INSULIN: ICD-10-CM

## 2024-02-27 LAB
ALBUMIN SERPL BCP-MCNC: 3.7 G/DL (ref 3.5–5.2)
ALP SERPL-CCNC: 69 U/L (ref 55–135)
ALT SERPL W/O P-5'-P-CCNC: 25 U/L (ref 10–44)
ANION GAP SERPL CALC-SCNC: 17 MMOL/L (ref 8–16)
AST SERPL-CCNC: 22 U/L (ref 10–40)
BILIRUB SERPL-MCNC: 0.3 MG/DL (ref 0.1–1)
BUN SERPL-MCNC: 14 MG/DL (ref 6–20)
CALCIUM SERPL-MCNC: 9.8 MG/DL (ref 8.7–10.5)
CHLORIDE SERPL-SCNC: 104 MMOL/L (ref 95–110)
CHOLEST SERPL-MCNC: 181 MG/DL (ref 120–199)
CHOLEST/HDLC SERPL: 3.3 {RATIO} (ref 2–5)
CO2 SERPL-SCNC: 22 MMOL/L (ref 23–29)
CREAT SERPL-MCNC: 1.2 MG/DL (ref 0.5–1.4)
EST. GFR  (NO RACE VARIABLE): 55.1 ML/MIN/1.73 M^2
ESTIMATED AVG GLUCOSE: 117 MG/DL (ref 68–131)
GLUCOSE SERPL-MCNC: 114 MG/DL (ref 70–110)
HBA1C MFR BLD: 5.7 % (ref 4–5.6)
HDLC SERPL-MCNC: 55 MG/DL (ref 40–75)
HDLC SERPL: 30.4 % (ref 20–50)
LDLC SERPL CALC-MCNC: 112.2 MG/DL (ref 63–159)
NONHDLC SERPL-MCNC: 126 MG/DL
POTASSIUM SERPL-SCNC: 4.8 MMOL/L (ref 3.5–5.1)
PROT SERPL-MCNC: 7.6 G/DL (ref 6–8.4)
SODIUM SERPL-SCNC: 143 MMOL/L (ref 136–145)
T4 FREE SERPL-MCNC: 0.96 NG/DL (ref 0.71–1.51)
TRIGL SERPL-MCNC: 69 MG/DL (ref 30–150)
TSH SERPL DL<=0.005 MIU/L-ACNC: 1.3 UIU/ML (ref 0.4–4)

## 2024-02-27 PROCEDURE — 80061 LIPID PANEL: CPT | Performed by: PHYSICIAN ASSISTANT

## 2024-02-27 PROCEDURE — 84439 ASSAY OF FREE THYROXINE: CPT | Performed by: PHYSICIAN ASSISTANT

## 2024-02-27 PROCEDURE — 84443 ASSAY THYROID STIM HORMONE: CPT | Performed by: PHYSICIAN ASSISTANT

## 2024-02-27 PROCEDURE — 83036 HEMOGLOBIN GLYCOSYLATED A1C: CPT | Performed by: PHYSICIAN ASSISTANT

## 2024-02-27 PROCEDURE — 80053 COMPREHEN METABOLIC PANEL: CPT | Performed by: PHYSICIAN ASSISTANT

## 2024-02-27 PROCEDURE — 36415 COLL VENOUS BLD VENIPUNCTURE: CPT | Mod: PO | Performed by: PHYSICIAN ASSISTANT

## 2024-03-04 ENCOUNTER — OFFICE VISIT (OUTPATIENT)
Dept: PULMONOLOGY | Facility: CLINIC | Age: 49
End: 2024-03-04
Payer: COMMERCIAL

## 2024-03-04 VITALS
HEART RATE: 65 BPM | BODY MASS INDEX: 45.99 KG/M2 | OXYGEN SATURATION: 99 % | SYSTOLIC BLOOD PRESSURE: 147 MMHG | DIASTOLIC BLOOD PRESSURE: 87 MMHG | HEIGHT: 67 IN | WEIGHT: 293 LBS

## 2024-03-04 DIAGNOSIS — G47.33 OSA (OBSTRUCTIVE SLEEP APNEA): Primary | ICD-10-CM

## 2024-03-04 PROCEDURE — 99203 OFFICE O/P NEW LOW 30 MIN: CPT | Mod: S$GLB,,, | Performed by: NURSE PRACTITIONER

## 2024-03-04 PROCEDURE — 3044F HG A1C LEVEL LT 7.0%: CPT | Mod: CPTII,S$GLB,, | Performed by: NURSE PRACTITIONER

## 2024-03-04 PROCEDURE — 99999 PR PBB SHADOW E&M-EST. PATIENT-LVL IV: CPT | Mod: PBBFAC,,, | Performed by: NURSE PRACTITIONER

## 2024-03-04 PROCEDURE — 3077F SYST BP >= 140 MM HG: CPT | Mod: CPTII,S$GLB,, | Performed by: NURSE PRACTITIONER

## 2024-03-04 PROCEDURE — 3008F BODY MASS INDEX DOCD: CPT | Mod: CPTII,S$GLB,, | Performed by: NURSE PRACTITIONER

## 2024-03-04 PROCEDURE — 1159F MED LIST DOCD IN RCRD: CPT | Mod: CPTII,S$GLB,, | Performed by: NURSE PRACTITIONER

## 2024-03-04 PROCEDURE — 3079F DIAST BP 80-89 MM HG: CPT | Mod: CPTII,S$GLB,, | Performed by: NURSE PRACTITIONER

## 2024-03-04 PROCEDURE — 3072F LOW RISK FOR RETINOPATHY: CPT | Mod: CPTII,S$GLB,, | Performed by: NURSE PRACTITIONER

## 2024-03-04 NOTE — PROGRESS NOTES
3/4/2024    Lavinia Herrera  New Patient Consult    Chief Complaint   Patient presents with    Sleep Apnea       HPI: previous patient Dr. Alexander, Dx sleep apnea 8 years prior, states wears CPAP nightly, has no complaints of daytime fatigue, drowsiness, mental grogginess.     Social Hx: lives with , pet dog, and pet cat, currently working as  in office setting, no known Asbestosis exposure, Smoking Hx: quit 25 years prior, 12 pack years  Family Hx: no Lung Cancer, COPD, Asthma, or LUCAS  Medical Hx: no previous pneumonia ; no previous shoulder/chest surgery      The chief compliant  problem is new to me  PFSH:  Past Medical History:   Diagnosis Date    Allergy     Diabetes mellitus, type 2     GERD (gastroesophageal reflux disease)     Hypothyroidism     Morbid obesity     Otitis media     Seasonal allergies     Sleep apnea          Past Surgical History:   Procedure Laterality Date    ENDOSCOPIC ULTRASOUND OF UPPER GASTROINTESTINAL TRACT N/A 3/3/2023    Procedure: ULTRASOUND, UPPER GI TRACT, ENDOSCOPIC;  Surgeon: Dc Riddle III, MD;  Location: [facility] ENDO;  Service: Endoscopy;  Laterality: N/A;    ERCP N/A 3/3/2023    Procedure: ERCP (ENDOSCOPIC RETROGRADE CHOLANGIOPANCREATOGRAPHY);  Surgeon: Dc Riddle III, MD;  Location: [facility] ENDO;  Service: Endoscopy;  Laterality: N/A;    ERCP N/A 3/7/2023    Procedure: ERCP (ENDOSCOPIC RETROGRADE CHOLANGIOPANCREATOGRAPHY);  Surgeon: Dc Riddle III, MD;  Location: [facility] ENDO;  Service: Endoscopy;  Laterality: N/A;    LAPAROSCOPIC CHOLECYSTECTOMY N/A 3/3/2023    Procedure: CHOLECYSTECTOMY, LAPAROSCOPIC;  Surgeon: Cade Elkins Jr., MD;  Location: [facility] OR;  Service: General;  Laterality: N/A;    MANDIBLE OSTEOTOMY      TYMPANOSTOMY TUBE PLACEMENT  14     Social History     Tobacco Use    Smoking status: Former     Current packs/day: 0.00     Types: Cigarettes     Quit date: 4/3/1998     Years since quittin.9     "Smokeless tobacco: Never   Substance Use Topics    Alcohol use: Yes     Comment: occasional    Drug use: No     Family History   Problem Relation Age of Onset    Hypertension Mother     Asthma Mother     Arthritis Mother     Diabetes Maternal Grandmother     Heart disease Maternal Grandmother         CHF    Breast cancer Neg Hx     Colon cancer Neg Hx     Ovarian cancer Neg Hx     Cancer Neg Hx     Allergic rhinitis Neg Hx     Allergies Neg Hx     Angioedema Neg Hx     Atopy Neg Hx     Eczema Neg Hx     Immunodeficiency Neg Hx     Rhinitis Neg Hx     Urticaria Neg Hx     Lupus Neg Hx     Psoriasis Neg Hx     Melanoma Neg Hx      Review of patient's allergies indicates:  No Known Allergies  I have reviewed past medical, family, and social history. I have reviewed previous nurse notes.        Performance Status:The patient's activity level is no limits with regular activity.      Review of Systems:  a review of eleven systems covering constitutional, Eye, HEENT, Psych, Respiratory, Cardiac, GI, , Musculoskeletal, Endocrine, Dermatologic was negative except for pertinent findings as listed ABOVE and below: pertinent positive as above, rest is good           Exam:Comprehensive exam done. BP (!) 147/87 (BP Location: Right forearm, Patient Position: Sitting, BP Method: Medium (Automatic))   Pulse 65   Ht 5' 7" (1.702 m)   Wt (!) 158.7 kg (349 lb 15.7 oz)   SpO2 99% Comment: on room air at rest  BMI 54.81 kg/m²   Exam included Vitals as listed, and patient's appearance and affect and alertness and mood, oral exam for yeast and hygiene and pharynx lesions and Mallapatti (M) score, neck with inspection for jvd and masses and thyroid abnormalities and lymph nodes (supraclavicular and infraclavicular nodes and axillary also examined and noted if abn), chest exam included symmetry and effort and fremitus and percussion and auscultation, cardiac exam included rhythm and gallops and murmur and rubs and jvd and edema, " abdominal exam for mass and hepatosplenomegaly and tenderness and hernias and bowel sounds, Musculoskeletal exam with muscle tone and posture and mobility/gait and  strength, and skin for rashes and cyanosis and pallor and turgor, extremity for clubbing.  Findings were normal except for pertinent findings listed below:   M2  Breath sounds clear        Radiographs (ct chest and cxr) reviewed: CTA  patient imaging studies reviewed and interpreted independently. My personal interpretation of most resent images include:      CTA Chest Non-Coronary 03/02/23 lungs clear    Labs: Patients labs reviewed including CBC and CMP  reviewed       Lab Results   Component Value Date    WBC 9.49 03/17/2023    RBC 3.74 (L) 03/17/2023    HGB 10.5 (L) 03/17/2023    HCT 34.3 (L) 03/17/2023    MCV 92 03/17/2023    MCH 28.1 03/17/2023    MCHC 30.6 (L) 03/17/2023    RDW 14.8 (H) 03/17/2023     03/17/2023    MPV 10.7 03/17/2023    GRAN 78.0 (H) 03/08/2023    LYMPH 6.0 (L) 03/08/2023    MONO 4.0 03/08/2023    EOS 0.3 03/07/2023    BASO 0.08 03/07/2023    EOSINOPHIL 2.0 03/08/2023    BASOPHIL 0.0 03/08/2023      Latest Reference Range & Units 03/17/23 10:51 02/27/24 09:20   CO2 23 - 29 mmol/L 26  26 22 (L)   (L): Data is abnormally low  PFT results reviewed  Pulmonary Functions Testing Results:    6/30/2016 FEV1 2.46L TLC 97; DCL0 98% no obstruction.     Polysomnogram 2/27/2019 AHI 9.2    Plan:  Clinical impression is apparently straight forward and impression with management as below.    Lavinia was seen today for sleep apnea.    Diagnoses and all orders for this visit:    LUCAS (obstructive sleep apnea)  -     Ambulatory referral/consult to Pulmonology  -     CPAP FOR HOME USE        Follow up in about 3 months (around 6/4/2024), or if symptoms worsen or fail to improve.        Discussed with patient above for education the following:      There are no Patient Instructions on file for this visit.

## 2024-03-05 ENCOUNTER — TELEPHONE (OUTPATIENT)
Dept: PULMONOLOGY | Facility: CLINIC | Age: 49
End: 2024-03-05
Payer: COMMERCIAL

## 2024-03-05 NOTE — TELEPHONE ENCOUNTER
Called Dr. Alexander's office and left voicemail     ----- Message from Erika Brambila NP sent at 3/4/2024  3:32 PM CST -----  Please call and request copy of polysomnogram from [facility] office if it can not be found in computer.

## 2024-03-06 ENCOUNTER — TELEPHONE (OUTPATIENT)
Dept: PULMONOLOGY | Facility: CLINIC | Age: 49
End: 2024-03-06
Payer: COMMERCIAL

## 2024-03-06 NOTE — TELEPHONE ENCOUNTER
Both polys/ cpap titration scanned in chart   ----- Message from Erika Brambila NP sent at 3/4/2024  3:32 PM CST -----  Please call and request copy of polysomnogram from [facility] office if it can not be found in computer.

## 2024-03-25 ENCOUNTER — PATIENT MESSAGE (OUTPATIENT)
Dept: ENDOCRINOLOGY | Facility: CLINIC | Age: 49
End: 2024-03-25
Payer: COMMERCIAL

## 2024-03-25 ENCOUNTER — PATIENT MESSAGE (OUTPATIENT)
Dept: PULMONOLOGY | Facility: CLINIC | Age: 49
End: 2024-03-25
Payer: COMMERCIAL

## 2024-04-03 ENCOUNTER — PATIENT MESSAGE (OUTPATIENT)
Dept: ENDOCRINOLOGY | Facility: CLINIC | Age: 49
End: 2024-04-03
Payer: COMMERCIAL

## 2024-04-03 DIAGNOSIS — E11.69 TYPE 2 DIABETES MELLITUS WITH OTHER SPECIFIED COMPLICATION, WITHOUT LONG-TERM CURRENT USE OF INSULIN: Primary | ICD-10-CM

## 2024-04-09 DIAGNOSIS — K21.9 GASTROESOPHAGEAL REFLUX DISEASE, UNSPECIFIED WHETHER ESOPHAGITIS PRESENT: ICD-10-CM

## 2024-04-09 RX ORDER — PANTOPRAZOLE SODIUM 40 MG/1
40 TABLET, DELAYED RELEASE ORAL
Qty: 90 TABLET | Refills: 1 | Status: SHIPPED | OUTPATIENT
Start: 2024-04-09

## 2024-04-29 ENCOUNTER — OFFICE VISIT (OUTPATIENT)
Dept: FAMILY MEDICINE | Facility: CLINIC | Age: 49
End: 2024-04-29
Payer: COMMERCIAL

## 2024-04-29 VITALS
DIASTOLIC BLOOD PRESSURE: 84 MMHG | WEIGHT: 293 LBS | BODY MASS INDEX: 45.99 KG/M2 | HEIGHT: 67 IN | OXYGEN SATURATION: 99 % | HEART RATE: 70 BPM | TEMPERATURE: 98 F | SYSTOLIC BLOOD PRESSURE: 140 MMHG

## 2024-04-29 DIAGNOSIS — Z90.49 HISTORY OF CHOLECYSTECTOMY: ICD-10-CM

## 2024-04-29 DIAGNOSIS — R19.7 DIARRHEA, UNSPECIFIED TYPE: Primary | ICD-10-CM

## 2024-04-29 PROCEDURE — 99999 PR PBB SHADOW E&M-EST. PATIENT-LVL III: CPT | Mod: PBBFAC,,,

## 2024-04-29 PROCEDURE — 3044F HG A1C LEVEL LT 7.0%: CPT | Mod: CPTII,S$GLB,,

## 2024-04-29 PROCEDURE — 1159F MED LIST DOCD IN RCRD: CPT | Mod: CPTII,S$GLB,,

## 2024-04-29 PROCEDURE — 3079F DIAST BP 80-89 MM HG: CPT | Mod: CPTII,S$GLB,,

## 2024-04-29 PROCEDURE — 3077F SYST BP >= 140 MM HG: CPT | Mod: CPTII,S$GLB,,

## 2024-04-29 PROCEDURE — 3072F LOW RISK FOR RETINOPATHY: CPT | Mod: CPTII,S$GLB,,

## 2024-04-29 PROCEDURE — 3008F BODY MASS INDEX DOCD: CPT | Mod: CPTII,S$GLB,,

## 2024-04-29 PROCEDURE — 1160F RVW MEDS BY RX/DR IN RCRD: CPT | Mod: CPTII,S$GLB,,

## 2024-04-29 PROCEDURE — 99213 OFFICE O/P EST LOW 20 MIN: CPT | Mod: S$GLB,,,

## 2024-04-29 RX ORDER — MONTELUKAST SODIUM 4 MG/1
2 TABLET, CHEWABLE ORAL DAILY
Qty: 60 TABLET | Refills: 3 | Status: SHIPPED | OUTPATIENT
Start: 2024-04-29 | End: 2025-04-29

## 2024-04-29 NOTE — PROGRESS NOTES
Subjective:       Patient ID: Lavinia Herrera is a 49 y.o. female.    Chief Complaint: diarrhea       Lavinia Herrera is a 49 y.o. female with history of DM2, GERD who presents to clinic for evaluation of diarrhea ongoing for about 2 months. Denies abdominal pain/ cramping prior to diarrhea. Notes each bowel movement is diarrhea. Reports subjective fever yesterday afternoon, but pt states she is unsure since she was just not feeling well. Reports acid reflux and nausea yesterday evening after eating dinner and lying down. Currently taking Protonix 40 mg daily. Had cholecystectomy in March of 2023. Unable to correlate certain foods with the diarrhea.         Review of Systems   Constitutional:  Positive for fever (subjective). Negative for chills and fatigue.   Respiratory:  Negative for cough and shortness of breath.    Cardiovascular:  Negative for chest pain.   Gastrointestinal:  Positive for diarrhea and nausea. Negative for abdominal pain, blood in stool, constipation and vomiting.   Neurological:  Negative for dizziness and headaches.         Past Medical History:   Diagnosis Date    Allergy     Diabetes mellitus, type 2     GERD (gastroesophageal reflux disease)     Hypothyroidism     Morbid obesity     Otitis media     Seasonal allergies     Sleep apnea        Review of patient's allergies indicates:  No Known Allergies      Current Outpatient Medications:     allopurinoL (ZYLOPRIM) 100 MG tablet, Take 1 tablet (100 mg total) by mouth once daily., Disp: 30 tablet, Rfl: 11    EScitalopram oxalate (LEXAPRO) 20 MG tablet, TAKE 1 TABLET(20 MG) BY MOUTH EVERY DAY, Disp: 90 tablet, Rfl: 1    ezetimibe (ZETIA) 10 mg tablet, Take 1 tablet (10 mg total) by mouth once daily. Cholesterol, Disp: 90 tablet, Rfl: 3    JARDIANCE 10 mg tablet, Take 10 mg by mouth., Disp: , Rfl:     levothyroxine (SYNTHROID) 100 MCG tablet, Take 1 tablet (100 mcg total) by mouth before breakfast., Disp: 30 tablet, Rfl: 11     "pantoprazole (PROTONIX) 40 MG tablet, TAKE 1 TABLET(40 MG) BY MOUTH EVERY DAY, Disp: 90 tablet, Rfl: 1    tirzepatide 10 mg/0.5 mL PnIj, Inject 10 mg into the skin every 7 days., Disp: 4 Pen, Rfl: 11    colestipoL (COLESTID) 1 gram Tab, Take 2 tablets (2 g total) by mouth once daily., Disp: 60 tablet, Rfl: 3    Objective:        Physical Exam  Vitals reviewed.   Constitutional:       Appearance: Normal appearance.   HENT:      Head: Normocephalic and atraumatic.   Eyes:      Conjunctiva/sclera: Conjunctivae normal.   Cardiovascular:      Rate and Rhythm: Normal rate and regular rhythm.      Heart sounds: Normal heart sounds.   Pulmonary:      Effort: Pulmonary effort is normal.      Breath sounds: Normal breath sounds.   Abdominal:      General: Bowel sounds are normal.      Palpations: Abdomen is soft.      Tenderness: There is no abdominal tenderness.   Musculoskeletal:         General: Normal range of motion.      Cervical back: Normal range of motion and neck supple.   Skin:     General: Skin is warm and dry.   Neurological:      Mental Status: She is alert and oriented to person, place, and time.           Visit Vitals  BP (!) 140/84 (BP Location: Right arm, Patient Position: Sitting, BP Method: Large (Manual))   Pulse 70   Temp 98.4 °F (36.9 °C) (Oral)   Ht 5' 7" (1.702 m)   Wt (!) 158.7 kg (349 lb 13.9 oz)   SpO2 99%   BMI 54.80 kg/m²      Assessment:         1. Diarrhea, unspecified type    2. History of cholecystectomy        Plan:         Diagnoses and all orders for this visit:    Diarrhea, unspecified type  -     colestipoL (COLESTID) 1 gram Tab; Take 2 tablets (2 g total) by mouth once daily.  -     Imodium as needed for diarrhea. Discussed s/e of constipation. Recommend increasing water intake to avoid constipation or dehydration.     History of cholecystectomy  -     colestipoL (COLESTID) 1 gram Tab; Take 2 tablets (2 g total) by mouth once daily.       Follow up if symptoms worsen or fail to improve " as anticipated.          Family Medicine Physician Assistant     Future Appointments       Date Provider Specialty Appt Notes    5/14/2024 ELIZABETH Nava PA-C Endocrinology thyroid    6/6/2024 Erika Brambila NP Pulmonology 3m f/u    7/2/2024 Nicolasa Art PA-C Family Medicine R/S [facility] PM APPT  6 month f/u    9/11/2024 ELIZABETH Nava PA-C Endocrinology Diabetes    1/6/2025 Oniel Malik MD Family Medicine year f/u             Tests to Keep You Healthy    Mammogram: Met on 7/12/2023  Eye Exam: Met on 12/18/2023  Colon Cancer Screening: DUE  Cervical Cancer Screening: Met on 3/25/2021  Last HbA1c < 8 (02/27/2024): Yes       I spent a total of 20 minutes on the day of the visit.This includes face to face time and non-face to face time preparing to see the patient (eg, review of tests), obtaining and/or reviewing separately obtained history, documenting clinical information in the electronic or other health record, independently interpreting results and communicating results to the patient/family/caregiver, or care coordinator.    We have addressed [3] Low: 2 or more self-limited or minor problems / 1 stable chronic illness / 1 acute, uncomplicated illness or injury  The complexity of the data reviewed and analyzed for this visit was [3] Limited (Reviewed prior external note, ordered unique testing or reviewed the results of each unique test)   The risk of complications and/or morbidity or mortality are [3] Low risk   The level of Medical Decision Making for this visit is [3] Low

## 2024-05-14 ENCOUNTER — OFFICE VISIT (OUTPATIENT)
Dept: ENDOCRINOLOGY | Facility: CLINIC | Age: 49
End: 2024-05-14
Payer: COMMERCIAL

## 2024-05-14 DIAGNOSIS — E04.1 NODULAR THYROID DISEASE: ICD-10-CM

## 2024-05-14 DIAGNOSIS — E11.69 TYPE 2 DIABETES MELLITUS WITH OTHER SPECIFIED COMPLICATION, WITHOUT LONG-TERM CURRENT USE OF INSULIN: Primary | ICD-10-CM

## 2024-05-14 DIAGNOSIS — E66.01 CLASS 3 SEVERE OBESITY WITH BODY MASS INDEX (BMI) OF 50.0 TO 59.9 IN ADULT, UNSPECIFIED OBESITY TYPE, UNSPECIFIED WHETHER SERIOUS COMORBIDITY PRESENT: ICD-10-CM

## 2024-05-14 DIAGNOSIS — E78.5 HYPERLIPIDEMIA, UNSPECIFIED HYPERLIPIDEMIA TYPE: ICD-10-CM

## 2024-05-14 DIAGNOSIS — E61.1 IRON DEFICIENCY: ICD-10-CM

## 2024-05-14 DIAGNOSIS — E03.8 HYPOTHYROIDISM DUE TO HASHIMOTO'S THYROIDITIS: ICD-10-CM

## 2024-05-14 DIAGNOSIS — E06.3 HYPOTHYROIDISM DUE TO HASHIMOTO'S THYROIDITIS: ICD-10-CM

## 2024-05-14 PROCEDURE — 1159F MED LIST DOCD IN RCRD: CPT | Mod: CPTII,95,, | Performed by: PHYSICIAN ASSISTANT

## 2024-05-14 PROCEDURE — 1160F RVW MEDS BY RX/DR IN RCRD: CPT | Mod: CPTII,95,, | Performed by: PHYSICIAN ASSISTANT

## 2024-05-14 PROCEDURE — 99214 OFFICE O/P EST MOD 30 MIN: CPT | Mod: 95,,, | Performed by: PHYSICIAN ASSISTANT

## 2024-05-14 PROCEDURE — 3072F LOW RISK FOR RETINOPATHY: CPT | Mod: CPTII,95,, | Performed by: PHYSICIAN ASSISTANT

## 2024-05-14 PROCEDURE — 3044F HG A1C LEVEL LT 7.0%: CPT | Mod: CPTII,95,, | Performed by: PHYSICIAN ASSISTANT

## 2024-05-14 RX ORDER — TIRZEPATIDE 12.5 MG/.5ML
12.5 INJECTION, SOLUTION SUBCUTANEOUS
Qty: 4 PEN | Refills: 11 | Status: SHIPPED | OUTPATIENT
Start: 2024-05-14

## 2024-05-14 NOTE — PROGRESS NOTES
CC: This 49 y.o. female presents for management of Diabetes Mellitus  and chronic conditions pending review including HTN, HLP.    The patient location is: work  The chief complaint leading to consultation is: T2DM    Visit type: audiovisual    Face to Face time with patient: 5 min  13 minutes of total time spent on the encounter, which includes face to face time and non-face to face time preparing to see the patient (eg, review of tests), Obtaining and/or reviewing separately obtained history, Documenting clinical information in the electronic or other health record, Independently interpreting results (not separately reported) and communicating results to the patient/family/caregiver, or Care coordination (not separately reported).     Each patient to whom he or she provides medical services by telemedicine is:  (1) informed of the relationship between the physician and patient and the respective role of any other health care provider with respect to management of the patient; and (2) notified that he or she may decline to receive medical services by telemedicine and may withdraw from such care at any time.    HPI: was diagnosed with T2DM in . Her son  from suicide in 10/21. Pt was admitted for pancreatitis and gallstones in 3/23.  Has never been hospitalized r/t DM.  Family hx of DM: grandmother  Fhx of thyroid disease:her sister has Hashimoto's Disease  Denies missing doses of DM medication.   hypoglycemia at home: none   monitoring BG at home:  Fastins    Pt was started on Lipitor last visit but experienced side effects.   She could not remember the side effect.    Diet: 3 meals daily.   BF: skipped  LH: pad yeni  DN: grilled ham & cheese  Snacks on peaches. Avoids sugary beverages. Drinks protein shake in the morning.    Exercise: Walking and doing squats a few times weekly    CURRENT DM MEDS: Mounjaro 10 mg weekly     Previous meds:  Metformin-constipation    Standards of Care:  Eye exam:   Dr. Wang[facility]-macular degeneration  Podiatry exam: 8/22 me  DE: 10/21 C. Gardache    Hypothyroidism  Taking 100 mcg qd.   Dx in 2014.   No hx of neck radiation.   + diarrhea  No hair loss, heat intolerance, tremors.     Cycles are irregular.     She does have a mirena.    Nodular Thyroid Disease  Thyroid u/s 8/23 shows a 2.3 cm nodule in the left thyroid and a 1.6 cm nodule in the rt thyroid.  No sob, dysphagia or voice changes. FNAs of the right middle nodule and left upper nodule resulted in FLUS.     She is taking vitamin d and zinc.  PMHx, PSHx: reviewed in epic.  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    Wt Readings from Last 10 Encounters:   04/29/24 (!) 158.7 kg (349 lb 13.9 oz)   03/04/24 (!) 158.7 kg (349 lb 15.7 oz)   01/02/24 (!) 166 kg (365 lb 15.4 oz)   08/09/23 (!) 180.5 kg (397 lb 14.9 oz)   06/21/23 (!) 182.4 kg (402 lb 1.9 oz)   03/29/23 (!) 184.6 kg (407 lb 1.3 oz)   03/17/23 (!) 185.9 kg (409 lb 13.4 oz)   03/16/23 (!) 185.9 kg (409 lb 13.4 oz)   03/06/23 (!) 201.7 kg (444 lb 11.2 oz)   11/09/22 (!) 199.5 kg (439 lb 13.1 oz)      ROS:   Gen: Appetite good, + wt loss (48 lbs), denies fatigue and weakness.  Skin: Skin is intact and heals well, no rashes, no hair changes  Eyes: Denies visual disturbances  Resp: no SOB or ANGELES, no cough  Cardiac: No palpitations, chest pain, no edema   GI: No nausea or vomiting, diarrhea, constipation, or abdominal pain.  /GYN: No nocturia, burning or pain.   MS/Neuro: Denies numbness/ tingling in BLE; Gait steady, speech clear  Psych: Denies drug/ETOH abuse, no hx of depression.  Other systems: negative.    There were no vitals taken for this visit.     PE:  GENERAL: middle aged female, well developed, well nourished.  PSYCH: AAOx3, appropriate mood and affect, pleasant expression, conversant, appears relaxed, well groomed.   EYES: Conjunctiva, corneas clear  NECK: Supple,  + AN  CHEST: Resp even and unlabored,   NEURO: Gait steady, CN ll-Xll  grossly intact.  SKIN: Skin warm and dry no acanthosis nigracans.  8/23  Foot Exam: no sores or macerations noted.     Protective Sensation (w/ 10 gram monofilament):  Right: Intact  Left: Intact    Visual Inspection:  Normal -  Bilateral and Nails Intact - without Evidence of Foot Deformity- Bilateral    Pedal Pulses:   Right: Present  Left: Present    Posterior tibialis:   Right:Present  Left: Present     Vibratory Sensation  Right:Positive  Left:Positive     Personally reviewed labs below:    No visits with results within 1 Month(s) from this visit.   Latest known visit with results is:   Lab Visit on 02/27/2024   Component Date Value Ref Range Status    Cholesterol 02/27/2024 181  120 - 199 mg/dL Final    Comment: The National Cholesterol Education Program (NCEP) has set the  following guidelines (reference ranges) for Cholesterol:  Optimal.....................<200 mg/dL  Borderline High.............200-239 mg/dL  High........................> or = 240 mg/dL      Triglycerides 02/27/2024 69  30 - 150 mg/dL Final    Comment: The National Cholesterol Education Program (NCEP) has set the  following guidelines (reference values) for triglycerides:  Normal......................<150 mg/dL  Borderline High.............150-199 mg/dL  High........................200-499 mg/dL      HDL 02/27/2024 55  40 - 75 mg/dL Final    Comment: The National Cholesterol Education Program (NCEP) has set the  following guidelines (reference values) for HDL Cholesterol:  Low...............<40 mg/dL  Optimal...........>60 mg/dL      LDL Cholesterol 02/27/2024 112.2  63.0 - 159.0 mg/dL Final    Comment: The National Cholesterol Education Program (NCEP) has set the  following guidelines (reference values) for LDL Cholesterol:  Optimal.......................<130 mg/dL  Borderline High...............130-159 mg/dL  High..........................160-189 mg/dL  Very High.....................>190 mg/dL      HDL/Cholesterol Ratio 02/27/2024 30.4   20.0 - 50.0 % Final    Total Cholesterol/HDL Ratio 02/27/2024 3.3  2.0 - 5.0 Final    Non-HDL Cholesterol 02/27/2024 126  mg/dL Final    Comment: Risk category and Non-HDL cholesterol goals:  Coronary heart disease (CHD)or equivalent (10-year risk of CHD >20%):  Non-HDL cholesterol goal     <130 mg/dL  Two or more CHD risk factors and 10-year risk of CHD <= 20%:  Non-HDL cholesterol goal     <160 mg/dL  0 to 1 CHD risk factor:  Non-HDL cholesterol goal     <190 mg/dL      Sodium 02/27/2024 143  136 - 145 mmol/L Final    Potassium 02/27/2024 4.8  3.5 - 5.1 mmol/L Final    Chloride 02/27/2024 104  95 - 110 mmol/L Final    CO2 02/27/2024 22 (L)  23 - 29 mmol/L Final    Glucose 02/27/2024 114 (H)  70 - 110 mg/dL Final    BUN 02/27/2024 14  6 - 20 mg/dL Final    Creatinine 02/27/2024 1.2  0.5 - 1.4 mg/dL Final    Calcium 02/27/2024 9.8  8.7 - 10.5 mg/dL Final    Total Protein 02/27/2024 7.6  6.0 - 8.4 g/dL Final    Albumin 02/27/2024 3.7  3.5 - 5.2 g/dL Final    Total Bilirubin 02/27/2024 0.3  0.1 - 1.0 mg/dL Final    Comment: For infants and newborns, interpretation of results should be based  on gestational age, weight and in agreement with clinical  observations.    Premature Infant recommended reference ranges:  Up to 24 hours.............<8.0 mg/dL  Up to 48 hours............<12.0 mg/dL  3-5 days..................<15.0 mg/dL  6-29 days.................<15.0 mg/dL      Alkaline Phosphatase 02/27/2024 69  55 - 135 U/L Final    AST 02/27/2024 22  10 - 40 U/L Final    ALT 02/27/2024 25  10 - 44 U/L Final    eGFR 02/27/2024 55.1 (A)  >60 mL/min/1.73 m^2 Final    Anion Gap 02/27/2024 17 (H)  8 - 16 mmol/L Final    Hemoglobin A1C 02/27/2024 5.7 (H)  4.0 - 5.6 % Final    Comment: ADA Screening Guidelines:  5.7-6.4%  Consistent with prediabetes  >or=6.5%  Consistent with diabetes    High levels of fetal hemoglobin interfere with the HbA1C  assay. Heterozygous hemoglobin variants (HbS, HgC, etc)do  not significantly interfere  with this assay.   However, presence of multiple variants may affect accuracy.      Estimated Avg Glucose 02/27/2024 117  68 - 131 mg/dL Final    Free T4 02/27/2024 0.96  0.71 - 1.51 ng/dL Final    TSH 02/27/2024 1.298  0.400 - 4.000 uIU/mL Final      EXAMINATION:  US SOFT TISSUE HEAD NECK THYROID     CLINICAL HISTORY:  Nontoxic single thyroid nodule     TECHNIQUE:  Ultrasound of the thyroid and cervical lymph nodes was performed.     COMPARISON:  Thyroid ultrasound of February 23, 2022     FINDINGS:  The right lobe measures 6.2 x 2.7 x 3.3 cm for a calculated volume of 29 cc.  The left lobe measures 6 x 3 x 3 cm for a calculated volume of 28 cc and a total thyroid volume of 57 cc.     On today's study on the right there is a 1 cm, a 1.6 cm and a 1.3 cm solid nodule of the right thyroid.  On the left there is a 2.2 cm and a 1.7 cm nodule of the left thyroid.  The patient has undergone bilateral fine-needle aspiration in March 2, 2022.     Impression:     Multinodular goiter.  Enlarging nodules are not seen        Electronically signed by: Yaazn Madison MD  Date:                                            08/10/2022  Time:                                           11:16     ASSESSMENT and PLAN:    1. Type 2 diabetes mellitus with other specified complication, without long-term current use of insulin  Hemoglobin A1C    TSH    T4, Free    Comprehensive Metabolic Panel      2. Hyperlipidemia, unspecified hyperlipidemia type        3. Hypothyroidism due to Hashimoto's thyroiditis        4. Nodular thyroid disease  US Soft Tissue Head Neck      5. Iron deficiency        6. Class 3 severe obesity with body mass index (BMI) of 50.0 to 59.9 in adult, unspecified obesity type, unspecified whether serious comorbidity present           T2DM with hyperglycemia-  A1c is below goal.   Increase mounjaro to 12.5 mg q weekly.  Increase the dose monthly.  She did not tolerate Metformin in the past.   Discussed DM, progression of  disease, long term complications, tx options.   Discussed A1c and BG goals.   Reviewed  hypoglycemia, s/s and appropriate tx.   Instructed to monitor BG and bring meter/ log to every clinic visit.   - takes ASA, ACEi,     HLP -stable LDL ,  LFTs WNL. Continue zetia 10 mg daily. Declines statins.  Hypothyroidism-TSH is stable. Continue Levothyroxine to 100 mcg daily. Continue Selenium 200 mcg qd.  Nodular Thyroid disease-pt declines bx. Pt wants to follow up w/ u/s. FNAs showed FLUS. Thyroid nodules are similar in size to previous exam.  Iron deficiency-may falsely lower a1c.  Obesity-There is no height or weight on file to calculate BMI. Increase exercise to 30 min daily. Decrease carbohydrates in diet.       Follow-up:   Has apt in 9/24-add labs and u/s prior

## 2024-06-26 ENCOUNTER — OFFICE VISIT (OUTPATIENT)
Dept: PULMONOLOGY | Facility: CLINIC | Age: 49
End: 2024-06-26
Payer: COMMERCIAL

## 2024-06-26 DIAGNOSIS — G47.33 OSA (OBSTRUCTIVE SLEEP APNEA): Primary | ICD-10-CM

## 2024-06-26 PROCEDURE — 1160F RVW MEDS BY RX/DR IN RCRD: CPT | Mod: CPTII,95,, | Performed by: NURSE PRACTITIONER

## 2024-06-26 PROCEDURE — 3044F HG A1C LEVEL LT 7.0%: CPT | Mod: CPTII,95,, | Performed by: NURSE PRACTITIONER

## 2024-06-26 PROCEDURE — 1159F MED LIST DOCD IN RCRD: CPT | Mod: CPTII,95,, | Performed by: NURSE PRACTITIONER

## 2024-06-26 PROCEDURE — 3072F LOW RISK FOR RETINOPATHY: CPT | Mod: CPTII,95,, | Performed by: NURSE PRACTITIONER

## 2024-06-26 PROCEDURE — 99213 OFFICE O/P EST LOW 20 MIN: CPT | Mod: 95,,, | Performed by: NURSE PRACTITIONER

## 2024-06-26 NOTE — PROGRESS NOTES
6/26/2024    Lavinia Herrera  The patient location is: [place], [place], [place]  The chief complaint leading to consultation is: Sleep apnea    Visit type: audiovisual    Face to Face time with patient: 5 minutes  10 minutes of total time spent on the encounter, which includes face to face time and non-face to face time preparing to see the patient (eg, review of tests), Obtaining and/or reviewing separately obtained history, Documenting clinical information in the electronic or other health record, Independently interpreting results (not separately reported) and communicating results to the patient/family/caregiver, or Care coordination (not separately reported).         Each patient to whom he or she provides medical services by telemedicine is:  (1) informed of the relationship between the physician and patient and the respective role of any other health care provider with respect to management of the patient; and (2) notified that he or she may decline to receive medical services by telemedicine and may withdraw from such care at any time.    Notes:       Chief Complaint   Patient presents with    Apnea       HPI:     6/26/2024- wearing CPAP nightly with benefit. States daytime fatigue is improved. Morning headaches are resolved.     3/4/2024- previous patient Dr. Alexander, Dx sleep apnea 8 years prior, states wears CPAP nightly, has no complaints of daytime fatigue, drowsiness, mental grogginess.     Social Hx: lives with , pet dog, and pet cat, currently working as  in office setting, no known Asbestosis exposure, Smoking Hx: quit 25 years prior, 12 pack years  Family Hx: no Lung Cancer, COPD, Asthma, or LUCAS  Medical Hx: no previous pneumonia ; no previous shoulder/chest surgery      The chief compliant  problem is improved  PFSH:  Past Medical History:   Diagnosis Date    Allergy     Diabetes mellitus, type 2     GERD (gastroesophageal reflux disease)     Hypothyroidism     Morbid obesity      Otitis media     Seasonal allergies     Sleep apnea          Past Surgical History:   Procedure Laterality Date    ENDOSCOPIC ULTRASOUND OF UPPER GASTROINTESTINAL TRACT N/A 3/3/2023    Procedure: ULTRASOUND, UPPER GI TRACT, ENDOSCOPIC;  Surgeon: Dc Riddle III, MD;  Location: [facility] ENDO;  Service: Endoscopy;  Laterality: N/A;    ERCP N/A 3/3/2023    Procedure: ERCP (ENDOSCOPIC RETROGRADE CHOLANGIOPANCREATOGRAPHY);  Surgeon: Dc Riddle III, MD;  Location: [facility] ENDO;  Service: Endoscopy;  Laterality: N/A;    ERCP N/A 3/7/2023    Procedure: ERCP (ENDOSCOPIC RETROGRADE CHOLANGIOPANCREATOGRAPHY);  Surgeon: Dc Riddle III, MD;  Location: [facility] ENDO;  Service: Endoscopy;  Laterality: N/A;    LAPAROSCOPIC CHOLECYSTECTOMY N/A 3/3/2023    Procedure: CHOLECYSTECTOMY, LAPAROSCOPIC;  Surgeon: Cade Elkins Jr., MD;  Location: [facility] OR;  Service: General;  Laterality: N/A;    MANDIBLE OSTEOTOMY      TYMPANOSTOMY TUBE PLACEMENT  14     Social History     Tobacco Use    Smoking status: Former     Current packs/day: 0.00     Types: Cigarettes     Quit date: 4/3/1998     Years since quittin.2    Smokeless tobacco: Never   Substance Use Topics    Alcohol use: Yes     Comment: occasional    Drug use: No     Family History   Problem Relation Name Age of Onset    Hypertension Mother      Asthma Mother      Arthritis Mother      Diabetes Maternal Grandmother      Heart disease Maternal Grandmother          CHF    Breast cancer Neg Hx      Colon cancer Neg Hx      Ovarian cancer Neg Hx      Cancer Neg Hx      Allergic rhinitis Neg Hx      Allergies Neg Hx      Angioedema Neg Hx      Atopy Neg Hx      Eczema Neg Hx      Immunodeficiency Neg Hx      Rhinitis Neg Hx      Urticaria Neg Hx      Lupus Neg Hx      Psoriasis Neg Hx      Melanoma Neg Hx       Review of patient's allergies indicates:  No Known Allergies  I have reviewed past medical, family, and social history. I have reviewed previous  nurse notes.        Performance Status:The patient's activity level is no limits with regular activity.      Review of Systems:  a review of eleven systems covering constitutional, Eye, HEENT, Psych, Respiratory, Cardiac, GI, , Musculoskeletal, Endocrine, Dermatologic was negative except for pertinent findings as listed ABOVE and below: pertinent positive as above, rest is good           Exam:Comprehensive exam done. There were no vitals taken for this visit.  Exam included Vitals as listed, and patient's appearance and affect and alertness and mood, oral exam for yeast and hygiene and pharynx lesions and Mallapatti (M) score, neck with inspection for jvd and masses and thyroid abnormalities and lymph nodes (supraclavicular and infraclavicular nodes and axillary also examined and noted if abn), chest exam included symmetry and effort and fremitus and percussion and auscultation, cardiac exam included rhythm and gallops and murmur and rubs and jvd and edema, abdominal exam for mass and hepatosplenomegaly and tenderness and hernias and bowel sounds, Musculoskeletal exam with muscle tone and posture and mobility/gait and  strength, and skin for rashes and cyanosis and pallor and turgor, extremity for clubbing.  Findings were normal except for pertinent findings listed below:   M2  Breath sounds clear        Radiographs (ct chest and cxr) reviewed: CTA  patient imaging studies reviewed and interpreted independently. My personal interpretation of most resent images include:      CTA Chest Non-Coronary 03/02/23 lungs clear    Labs: Patients labs reviewed including CBC and CMP  reviewed       Lab Results   Component Value Date    WBC 9.49 03/17/2023    RBC 3.74 (L) 03/17/2023    HGB 10.5 (L) 03/17/2023    HCT 34.3 (L) 03/17/2023    MCV 92 03/17/2023    MCH 28.1 03/17/2023    MCHC 30.6 (L) 03/17/2023    RDW 14.8 (H) 03/17/2023     03/17/2023    MPV 10.7 03/17/2023    GRAN 78.0 (H) 03/08/2023    LYMPH 6.0 (L)  03/08/2023    MONO 4.0 03/08/2023    EOS 0.3 03/07/2023    BASO 0.08 03/07/2023    EOSINOPHIL 2.0 03/08/2023    BASOPHIL 0.0 03/08/2023      Latest Reference Range & Units 03/17/23 10:51 02/27/24 09:20   CO2 23 - 29 mmol/L 26  26 22 (L)   (L): Data is abnormally low  PFT results reviewed  Pulmonary Functions Testing Results:    6/30/2016 FEV1 2.46L TLC 97; DCL0 98% no obstruction.     Polysomnogram 2/27/2019 AHI 9.2    Plan:  Clinical impression is apparently straight forward and impression with management as below.    Lavinia was seen today for apnea.    Diagnoses and all orders for this visit:    LUCAS (obstructive sleep apnea)  Comments:  - continue CPAP therapy nightly          Follow up in about 1 year (around 6/26/2025), or if symptoms worsen or fail to improve.        Discussed with patient above for education the following:      There are no Patient Instructions on file for this visit.

## 2024-06-27 ENCOUNTER — OFFICE VISIT (OUTPATIENT)
Dept: FAMILY MEDICINE | Facility: CLINIC | Age: 49
End: 2024-06-27
Payer: COMMERCIAL

## 2024-06-27 VITALS
HEIGHT: 67 IN | BODY MASS INDEX: 45.99 KG/M2 | WEIGHT: 293 LBS | SYSTOLIC BLOOD PRESSURE: 122 MMHG | RESPIRATION RATE: 16 BRPM | DIASTOLIC BLOOD PRESSURE: 80 MMHG

## 2024-06-27 DIAGNOSIS — T50.905A PILL ESOPHAGITIS: Primary | ICD-10-CM

## 2024-06-27 DIAGNOSIS — K21.9 GASTROESOPHAGEAL REFLUX DISEASE, UNSPECIFIED WHETHER ESOPHAGITIS PRESENT: ICD-10-CM

## 2024-06-27 DIAGNOSIS — Z12.4 CERVICAL CANCER SCREENING: ICD-10-CM

## 2024-06-27 DIAGNOSIS — K20.80 PILL ESOPHAGITIS: Primary | ICD-10-CM

## 2024-06-27 PROCEDURE — 3079F DIAST BP 80-89 MM HG: CPT | Mod: CPTII,S$GLB,,

## 2024-06-27 PROCEDURE — 99999 PR PBB SHADOW E&M-EST. PATIENT-LVL IV: CPT | Mod: PBBFAC,,,

## 2024-06-27 PROCEDURE — 99213 OFFICE O/P EST LOW 20 MIN: CPT | Mod: S$GLB,,,

## 2024-06-27 PROCEDURE — 1159F MED LIST DOCD IN RCRD: CPT | Mod: CPTII,S$GLB,,

## 2024-06-27 PROCEDURE — 3072F LOW RISK FOR RETINOPATHY: CPT | Mod: CPTII,S$GLB,,

## 2024-06-27 PROCEDURE — 3044F HG A1C LEVEL LT 7.0%: CPT | Mod: CPTII,S$GLB,,

## 2024-06-27 PROCEDURE — 3074F SYST BP LT 130 MM HG: CPT | Mod: CPTII,S$GLB,,

## 2024-06-27 PROCEDURE — 3008F BODY MASS INDEX DOCD: CPT | Mod: CPTII,S$GLB,,

## 2024-06-27 PROCEDURE — 1160F RVW MEDS BY RX/DR IN RCRD: CPT | Mod: CPTII,S$GLB,,

## 2024-06-27 NOTE — PROGRESS NOTES
"Subjective:       Patient ID: Lavinia Herrera is a 49 y.o. female Body mass index is 53.55 kg/m².    Chief Complaint: Dysphagia (/)    This patient is new to me.  Referring Provider: Ana Maria Art for pill esophagitis.     Pill esophagitis due to magnesium capsules getting stuck. Reports history of frequent ear infections for which she was started on protonix. She is on Munjaro for weight loss and reports the days following her injection each week are "rough" with feelings of food getting stuck in her throat and bile acid diarrhea.      Review of Systems   Constitutional:  Positive for appetite change. Negative for activity change, fatigue, fever and unexpected weight change.   HENT:  Positive for postnasal drip, sore throat and trouble swallowing.    Respiratory:  Positive for cough. Negative for shortness of breath.    Cardiovascular:  Negative for chest pain.   Gastrointestinal:  Positive for abdominal pain, constipation, diarrhea and nausea. Negative for abdominal distention, anal bleeding, blood in stool, rectal pain and vomiting.       Patient's last menstrual period was 06/11/2024 (within days).  Past Medical History:   Diagnosis Date    Allergy     Diabetes mellitus, type 2     GERD (gastroesophageal reflux disease)     Hypothyroidism     Morbid obesity     Otitis media     Seasonal allergies     Sleep apnea      Past Surgical History:   Procedure Laterality Date    ENDOSCOPIC ULTRASOUND OF UPPER GASTROINTESTINAL TRACT N/A 3/3/2023    Procedure: ULTRASOUND, UPPER GI TRACT, ENDOSCOPIC;  Surgeon: Dc Riddle III, MD;  Location: [facility];  Service: Endoscopy;  Laterality: N/A;    ERCP N/A 3/3/2023    Procedure: ERCP (ENDOSCOPIC RETROGRADE CHOLANGIOPANCREATOGRAPHY);  Surgeon: Dc Riddle III, MD;  Location: [facility];  Service: Endoscopy;  Laterality: N/A;    ERCP N/A 3/7/2023    Procedure: ERCP (ENDOSCOPIC RETROGRADE CHOLANGIOPANCREATOGRAPHY);  Surgeon: Dc Riddle III, MD;  " Location: [facility] ENDO;  Service: Endoscopy;  Laterality: N/A;    LAPAROSCOPIC CHOLECYSTECTOMY N/A 3/3/2023    Procedure: CHOLECYSTECTOMY, LAPAROSCOPIC;  Surgeon: Cade Elkins Jr., MD;  Location: [facility] OR;  Service: General;  Laterality: N/A;    MANDIBLE OSTEOTOMY      TYMPANOSTOMY TUBE PLACEMENT  13     Family History   Problem Relation Name Age of Onset    Hypertension Mother      Asthma Mother      Arthritis Mother      Diabetes Maternal Grandmother      Heart disease Maternal Grandmother          CHF    Breast cancer Neg Hx      Colon cancer Neg Hx      Ovarian cancer Neg Hx      Cancer Neg Hx      Allergic rhinitis Neg Hx      Allergies Neg Hx      Angioedema Neg Hx      Atopy Neg Hx      Eczema Neg Hx      Immunodeficiency Neg Hx      Rhinitis Neg Hx      Urticaria Neg Hx      Lupus Neg Hx      Psoriasis Neg Hx      Melanoma Neg Hx       Social History     Tobacco Use    Smoking status: Former     Current packs/day: 0.00     Types: Cigarettes     Quit date: 4/3/1998     Years since quittin.2    Smokeless tobacco: Never   Substance Use Topics    Alcohol use: Yes     Comment: occasional    Drug use: No     Wt Readings from Last 10 Encounters:   24 (!) 155.1 kg (341 lb 14.9 oz)   24 (!) 156 kg (343 lb 14.7 oz)   24 (!) 158.7 kg (349 lb 13.9 oz)   24 (!) 158.7 kg (349 lb 15.7 oz)   24 (!) 166 kg (365 lb 15.4 oz)   23 (!) 180.5 kg (397 lb 14.9 oz)   23 (!) 182.4 kg (402 lb 1.9 oz)   23 (!) 184.6 kg (407 lb 1.3 oz)   23 (!) 185.9 kg (409 lb 13.4 oz)   23 (!) 185.9 kg (409 lb 13.4 oz)     Lab Results   Component Value Date    WBC 9.49 2023    HGB 10.5 (L) 2023    HCT 34.3 (L) 2023    MCV 92 2023     2023     CMP  Sodium   Date Value Ref Range Status   2024 143 136 - 145 mmol/L Final     Potassium   Date Value Ref Range Status   2024 4.8 3.5 - 5.1 mmol/L Final     Chloride   Date Value Ref Range  "Status   02/27/2024 104 95 - 110 mmol/L Final     CO2   Date Value Ref Range Status   02/27/2024 22 (L) 23 - 29 mmol/L Final     Glucose   Date Value Ref Range Status   02/27/2024 114 (H) 70 - 110 mg/dL Final     BUN   Date Value Ref Range Status   02/27/2024 14 6 - 20 mg/dL Final     Creatinine   Date Value Ref Range Status   02/27/2024 1.2 0.5 - 1.4 mg/dL Final     Calcium   Date Value Ref Range Status   02/27/2024 9.8 8.7 - 10.5 mg/dL Final     Total Protein   Date Value Ref Range Status   02/27/2024 7.6 6.0 - 8.4 g/dL Final     Albumin   Date Value Ref Range Status   02/27/2024 3.7 3.5 - 5.2 g/dL Final     Total Bilirubin   Date Value Ref Range Status   02/27/2024 0.3 0.1 - 1.0 mg/dL Final     Comment:     For infants and newborns, interpretation of results should be based  on gestational age, weight and in agreement with clinical  observations.    Premature Infant recommended reference ranges:  Up to 24 hours.............<8.0 mg/dL  Up to 48 hours............<12.0 mg/dL  3-5 days..................<15.0 mg/dL  6-29 days.................<15.0 mg/dL       Alkaline Phosphatase   Date Value Ref Range Status   02/27/2024 69 55 - 135 U/L Final     AST   Date Value Ref Range Status   02/27/2024 22 10 - 40 U/L Final     ALT   Date Value Ref Range Status   02/27/2024 25 10 - 44 U/L Final     Anion Gap   Date Value Ref Range Status   02/27/2024 17 (H) 8 - 16 mmol/L Final     eGFR if    Date Value Ref Range Status   07/01/2022 >60.0 >60 mL/min/1.73 m^2 Final     eGFR if non    Date Value Ref Range Status   07/01/2022 >60.0 >60 mL/min/1.73 m^2 Final     Comment:     Calculation used to obtain the estimated glomerular filtration  rate (eGFR) is the CKD-EPI equation.        No results found for: "AMYLASE"  Lab Results   Component Value Date    LIPASE 89 (H) 03/17/2023     No results found for: "LIPASERES"  Lab Results   Component Value Date    TSH 1.298 02/27/2024       Reviewed prior medical " records including radiology report of ERCP 2023, U/S Abdomen 2023, chest x-ray 2023 & endoscopy history (see surgical history).    Objective:      Physical Exam  Vitals and nursing note reviewed.   Constitutional:       General: She is not in acute distress.     Appearance: She is obese. She is not ill-appearing.   HENT:      Head: Normocephalic and atraumatic.      Nose: Nose normal.      Mouth/Throat:      Mouth: Mucous membranes are moist.      Pharynx: Oropharynx is clear.   Eyes:      Conjunctiva/sclera: Conjunctivae normal.   Cardiovascular:      Rate and Rhythm: Normal rate.      Pulses: Normal pulses.   Pulmonary:      Effort: Pulmonary effort is normal. No respiratory distress.   Abdominal:      General: Abdomen is flat. Bowel sounds are normal. There is no distension.      Palpations: Abdomen is soft.      Tenderness: There is no abdominal tenderness.   Skin:     General: Skin is warm and dry.      Capillary Refill: Capillary refill takes 2 to 3 seconds.   Neurological:      Mental Status: She is alert and oriented to person, place, and time.         Assessment:       1. Pill esophagitis    2. Gastroesophageal reflux disease, unspecified whether esophagitis present    3. Screening for colon cancer        Plan:       Pill esophagitis  - schedule EGD, discussed procedure with patient, including risks and benefits, patient verbalized understanding  Protonix BID x 1 month  Carafate for 10 days     If no relief after 1 month reach out to office for further     -     pantoprazole (PROTONIX) 40 MG tablet; Take 1 tablet (40 mg total) by mouth 2 (two) times daily.  Dispense: 60 tablet; Refill: 0  -     Ambulatory referral/consult to Gastroenterology  -     sucralfate (CARAFATE) 1 gram tablet; Take 1 tablet (1 g total) by mouth 4 (four) times daily before meals and nightly. for 10 days  Dispense: 40 tablet; Refill: 0    Gastroesophageal reflux disease, unspecified whether esophagitis present  -discussed about the  different types of medications used to treat reflux and how to use them, antacids can be used PRN for breakthrough heartburn symptoms by reducing stomach acid that is already produced, H2 blockers work by limiting the amount acid production, & PPI's work to block acid production and are taken daily, patient verbalized understanding.  -Educated patient on lifestyle modifications to help control/reduce reflux/abdominal pain including: avoid large meals, avoid eating within 2-3 hours of bedtime (avoid late night eating & lying down soon after eating), elevate head of bed if nocturnal symptoms are present, smoking cessation (if current smoker), & weight loss (if overweight).   -Educated to avoid known foods which trigger reflux symptoms & to minimize/avoid high-fat foods, chocolate, caffeine, citrus, alcohol, & tomato products.  -Advised to avoid/limit use of NSAID's, since they can cause GI upset, bleeding, and/or ulcers. If needed, take with food.   - schedule EGD, discussed procedure with patient, including risks and benefits, patient verbalized understanding    -     Ambulatory referral/consult to Gastroenterology  -     sucralfate (CARAFATE) 1 gram tablet; Take 1 tablet (1 g total) by mouth 4 (four) times daily before meals and nightly. for 10 days  Dispense: 40 tablet; Refill: 0    Screening for colon cancer   - schedule Colonoscopy, discussed procedure with the patient, including risks and benefits, patient verbalized understanding    Follow up if symptoms worsen or fail to improve.      If no improvement in symptoms or symptoms worsen, call/follow-up at clinic or go to ER.       DEDRA Azar, CHIP-C    Encounter includes face to face time and non-face to face time preparing to see the patient (eg, review of tests), obtaining and/or reviewing separately obtained history, documenting clinical information in the electronic or other health record, independently interpreting results (not separately reported)  and communicating results to the patient/family/caregiver, or care coordination (not separately reported).     A dictation software program was used for this note. Please expect some simple typographical errors in this note.

## 2024-06-27 NOTE — PROGRESS NOTES
Subjective:       Patient ID: Lavinia Herrera is a 49 y.o. female.    Chief Complaint: Sore Throat (X 1 week - denies pain when eating or drinking but it feels like a swallowing a pill at times. )      Lavinia Herrera is a 49 y.o. female with GERD, hypothyroidism who presents to clinic for evaluation of sore throat ongoing for about 1 week. Pt reports sensation feels like pill is stuck in the esophagus. Denies trouble or pain swallowing. Denies dysphagia to solids or liquids. Denies nasal congestion, runny nose, cough, body aches, or headaches. She does take Protonix for her acid reflux, and denies exacerbation of acid reflux.         Review of Systems   Constitutional:  Negative for chills, fatigue and fever.   HENT:  Positive for sore throat. Negative for congestion, ear pain, postnasal drip, rhinorrhea and sinus pressure.    Respiratory:  Negative for cough and shortness of breath.    Cardiovascular:  Negative for chest pain.   Gastrointestinal:  Negative for nausea and vomiting.        No acid reflux   Musculoskeletal:  Negative for myalgias.   Skin:  Negative for rash.   Neurological:  Negative for dizziness and headaches.         Past Medical History:   Diagnosis Date    Allergy     Diabetes mellitus, type 2     GERD (gastroesophageal reflux disease)     Hypothyroidism     Morbid obesity     Otitis media     Seasonal allergies     Sleep apnea        Review of patient's allergies indicates:  No Known Allergies      Current Outpatient Medications:     allopurinoL (ZYLOPRIM) 100 MG tablet, Take 1 tablet (100 mg total) by mouth once daily., Disp: 30 tablet, Rfl: 11    colestipoL (COLESTID) 1 gram Tab, Take 2 tablets (2 g total) by mouth once daily., Disp: 60 tablet, Rfl: 3    EScitalopram oxalate (LEXAPRO) 20 MG tablet, TAKE 1 TABLET(20 MG) BY MOUTH EVERY DAY, Disp: 90 tablet, Rfl: 1    ezetimibe (ZETIA) 10 mg tablet, Take 1 tablet (10 mg total) by mouth once daily. Cholesterol, Disp: 90 tablet, Rfl: 3     "levothyroxine (SYNTHROID) 100 MCG tablet, Take 1 tablet (100 mcg total) by mouth before breakfast., Disp: 30 tablet, Rfl: 11    pantoprazole (PROTONIX) 40 MG tablet, TAKE 1 TABLET(40 MG) BY MOUTH EVERY DAY, Disp: 90 tablet, Rfl: 1    tirzepatide (MOUNJARO) 12.5 mg/0.5 mL PnIj, Inject 12.5 mg into the skin every 7 days., Disp: 4 Pen, Rfl: 11    Objective:        Physical Exam  Vitals reviewed.   Constitutional:       Appearance: Normal appearance.   HENT:      Head: Normocephalic and atraumatic.      Mouth/Throat:      Pharynx: Uvula midline. No oropharyngeal exudate or posterior oropharyngeal erythema.      Tonsils: No tonsillar exudate.   Eyes:      Conjunctiva/sclera: Conjunctivae normal.   Cardiovascular:      Rate and Rhythm: Normal rate and regular rhythm.      Heart sounds: Normal heart sounds.   Pulmonary:      Effort: Pulmonary effort is normal.      Breath sounds: Normal breath sounds.   Musculoskeletal:         General: Normal range of motion.      Cervical back: Normal range of motion and neck supple.   Lymphadenopathy:      Cervical: No cervical adenopathy.   Skin:     General: Skin is warm and dry.   Neurological:      Mental Status: She is alert and oriented to person, place, and time.   Psychiatric:         Behavior: Behavior normal.           Visit Vitals  /80 (BP Location: Right forearm, Patient Position: Sitting, BP Method: Small (Manual))   Resp 16   Ht 5' 7" (1.702 m)   Wt (!) 156 kg (343 lb 14.7 oz)   LMP 06/11/2024 (Within Days)   BMI 53.87 kg/m²      Assessment:         1. Pill esophagitis    2. Gastroesophageal reflux disease, unspecified whether esophagitis present    3. Cervical cancer screening        Plan:         Lavinia was seen today for sore throat.    Diagnoses and all orders for this visit:    Pill esophagitis  -     Ambulatory referral/consult to Gastroenterology; Future    Gastroesophageal reflux disease, unspecified whether esophagitis present  -     Ambulatory " referral/consult to Gastroenterology; Future    Cervical cancer screening  -     Ambulatory referral/consult to Obstetrics / Gynecology; Future       Follow up as scheduled or sooner if needed.        Family Medicine Physician Assistant     Future Appointments       Date Provider Specialty Appt Notes    7/1/2024 Paula Mittal NP Gastroenterology esophageous inflammation    7/2/2024 Nicolasa Art PA-C Family Medicine R/S [facility] PM APPT  6 month f/u    8/19/2024 Darryl Harper MD Obstetrics and Gynecology abnormal cycle    9/11/2024 ELIZABETH Nava PA-C Endocrinology Diabetes    1/6/2025 Oniel Malik MD Family Medicine year f/u             Tests to Keep You Healthy    Mammogram: Met on 7/12/2023  Eye Exam: Met on 12/18/2023  Colon Cancer Screening: DUE  Cervical Cancer Screening: Met on 3/25/2021  Last HbA1c < 8 (02/27/2024): Yes       I spent a total of 20 minutes on the day of the visit.This includes face to face time and non-face to face time preparing to see the patient (eg, review of tests), obtaining and/or reviewing separately obtained history, documenting clinical information in the electronic or other health record, independently interpreting results and communicating results to the patient/family/caregiver, or care coordinator.

## 2024-07-01 ENCOUNTER — OFFICE VISIT (OUTPATIENT)
Dept: GASTROENTEROLOGY | Facility: CLINIC | Age: 49
End: 2024-07-01
Payer: COMMERCIAL

## 2024-07-01 VITALS
HEART RATE: 53 BPM | SYSTOLIC BLOOD PRESSURE: 150 MMHG | RESPIRATION RATE: 18 BRPM | DIASTOLIC BLOOD PRESSURE: 91 MMHG | HEIGHT: 67 IN | WEIGHT: 293 LBS | BODY MASS INDEX: 45.99 KG/M2

## 2024-07-01 DIAGNOSIS — T50.905A PILL ESOPHAGITIS: Primary | ICD-10-CM

## 2024-07-01 DIAGNOSIS — K21.9 GASTROESOPHAGEAL REFLUX DISEASE, UNSPECIFIED WHETHER ESOPHAGITIS PRESENT: ICD-10-CM

## 2024-07-01 DIAGNOSIS — K20.80 PILL ESOPHAGITIS: Primary | ICD-10-CM

## 2024-07-01 DIAGNOSIS — Z12.11 SCREENING FOR COLON CANCER: ICD-10-CM

## 2024-07-01 PROCEDURE — 99204 OFFICE O/P NEW MOD 45 MIN: CPT | Mod: S$GLB,,,

## 2024-07-01 PROCEDURE — 3072F LOW RISK FOR RETINOPATHY: CPT | Mod: CPTII,S$GLB,,

## 2024-07-01 PROCEDURE — 3044F HG A1C LEVEL LT 7.0%: CPT | Mod: CPTII,S$GLB,,

## 2024-07-01 PROCEDURE — 99999 PR PBB SHADOW E&M-EST. PATIENT-LVL IV: CPT | Mod: PBBFAC,,,

## 2024-07-01 PROCEDURE — 3080F DIAST BP >= 90 MM HG: CPT | Mod: CPTII,S$GLB,,

## 2024-07-01 PROCEDURE — 3008F BODY MASS INDEX DOCD: CPT | Mod: CPTII,S$GLB,,

## 2024-07-01 PROCEDURE — 3077F SYST BP >= 140 MM HG: CPT | Mod: CPTII,S$GLB,,

## 2024-07-01 PROCEDURE — 1159F MED LIST DOCD IN RCRD: CPT | Mod: CPTII,S$GLB,,

## 2024-07-01 RX ORDER — SUCRALFATE 1 G/1
1 TABLET ORAL
Qty: 40 TABLET | Refills: 0 | Status: SHIPPED | OUTPATIENT
Start: 2024-07-01 | End: 2024-07-11

## 2024-07-01 RX ORDER — PANTOPRAZOLE SODIUM 40 MG/1
40 TABLET, DELAYED RELEASE ORAL 2 TIMES DAILY
Qty: 180 TABLET | OUTPATIENT
Start: 2024-07-01

## 2024-07-01 RX ORDER — PANTOPRAZOLE SODIUM 40 MG/1
40 TABLET, DELAYED RELEASE ORAL 2 TIMES DAILY
Qty: 60 TABLET | Refills: 0 | Status: SHIPPED | OUTPATIENT
Start: 2024-07-01 | End: 2024-07-31

## 2024-07-02 ENCOUNTER — OFFICE VISIT (OUTPATIENT)
Dept: FAMILY MEDICINE | Facility: CLINIC | Age: 49
End: 2024-07-02
Payer: COMMERCIAL

## 2024-07-02 VITALS
HEIGHT: 67 IN | SYSTOLIC BLOOD PRESSURE: 120 MMHG | HEART RATE: 69 BPM | OXYGEN SATURATION: 98 % | TEMPERATURE: 98 F | WEIGHT: 293 LBS | BODY MASS INDEX: 45.99 KG/M2 | DIASTOLIC BLOOD PRESSURE: 74 MMHG

## 2024-07-02 DIAGNOSIS — E03.8 HYPOTHYROIDISM DUE TO HASHIMOTO'S THYROIDITIS: Primary | ICD-10-CM

## 2024-07-02 DIAGNOSIS — E11.69 TYPE 2 DIABETES MELLITUS WITH OTHER SPECIFIED COMPLICATION, WITHOUT LONG-TERM CURRENT USE OF INSULIN: ICD-10-CM

## 2024-07-02 DIAGNOSIS — Z12.31 ENCOUNTER FOR SCREENING MAMMOGRAM FOR MALIGNANT NEOPLASM OF BREAST: ICD-10-CM

## 2024-07-02 DIAGNOSIS — E06.3 HYPOTHYROIDISM DUE TO HASHIMOTO'S THYROIDITIS: Primary | ICD-10-CM

## 2024-07-02 DIAGNOSIS — K21.9 GASTROESOPHAGEAL REFLUX DISEASE, UNSPECIFIED WHETHER ESOPHAGITIS PRESENT: ICD-10-CM

## 2024-07-02 PROCEDURE — 3078F DIAST BP <80 MM HG: CPT | Mod: CPTII,S$GLB,,

## 2024-07-02 PROCEDURE — 99999 PR PBB SHADOW E&M-EST. PATIENT-LVL III: CPT | Mod: PBBFAC,,,

## 2024-07-02 PROCEDURE — 3008F BODY MASS INDEX DOCD: CPT | Mod: CPTII,S$GLB,,

## 2024-07-02 PROCEDURE — 1160F RVW MEDS BY RX/DR IN RCRD: CPT | Mod: CPTII,S$GLB,,

## 2024-07-02 PROCEDURE — 3044F HG A1C LEVEL LT 7.0%: CPT | Mod: CPTII,S$GLB,,

## 2024-07-02 PROCEDURE — 99214 OFFICE O/P EST MOD 30 MIN: CPT | Mod: S$GLB,,,

## 2024-07-02 PROCEDURE — 3072F LOW RISK FOR RETINOPATHY: CPT | Mod: CPTII,S$GLB,,

## 2024-07-02 PROCEDURE — 1159F MED LIST DOCD IN RCRD: CPT | Mod: CPTII,S$GLB,,

## 2024-07-02 PROCEDURE — 3074F SYST BP LT 130 MM HG: CPT | Mod: CPTII,S$GLB,,

## 2024-07-02 NOTE — PROGRESS NOTES
Subjective:       Patient ID: Lavinia Herrera is a 49 y.o. female.    Chief Complaint: 6 month follow up       Lavinia Herrera is a 49 y.o. female with DM2, GERD, hypothyroidism who presents to clinic for 6 month follow up. Doing well overall. Recently saw GI for pill esophagitis. Plans for upcoming EGD and colonoscopy. Following with endocrinology for DM2 and hypothyroidism, plans for repeat labs in September. Has apt to est care with OBGYN for routine pap. Mammogram is due.         Review of Systems   Constitutional:  Negative for activity change, appetite change and fatigue.   Respiratory:  Negative for cough and shortness of breath.    Cardiovascular:  Negative for chest pain, palpitations and leg swelling.   Gastrointestinal:  Negative for abdominal pain, constipation and diarrhea.   Musculoskeletal:  Negative for arthralgias.   Skin:  Negative for rash.   Neurological:  Negative for dizziness, light-headedness and headaches.   Psychiatric/Behavioral:  Negative for dysphoric mood. The patient is not nervous/anxious.          Past Medical History:   Diagnosis Date    Allergy     Diabetes mellitus, type 2     GERD (gastroesophageal reflux disease)     Hypothyroidism     Morbid obesity     Otitis media     Seasonal allergies     Sleep apnea        Review of patient's allergies indicates:  No Known Allergies      Current Outpatient Medications:     allopurinoL (ZYLOPRIM) 100 MG tablet, Take 1 tablet (100 mg total) by mouth once daily., Disp: 30 tablet, Rfl: 11    colestipoL (COLESTID) 1 gram Tab, Take 2 tablets (2 g total) by mouth once daily., Disp: 60 tablet, Rfl: 3    EScitalopram oxalate (LEXAPRO) 20 MG tablet, TAKE 1 TABLET(20 MG) BY MOUTH EVERY DAY, Disp: 90 tablet, Rfl: 1    ezetimibe (ZETIA) 10 mg tablet, Take 1 tablet (10 mg total) by mouth once daily. Cholesterol, Disp: 90 tablet, Rfl: 3    levothyroxine (SYNTHROID) 100 MCG tablet, Take 1 tablet (100 mcg total) by mouth before breakfast., Disp: 30  "tablet, Rfl: 11    pantoprazole (PROTONIX) 40 MG tablet, Take 1 tablet (40 mg total) by mouth 2 (two) times daily., Disp: 60 tablet, Rfl: 0    sucralfate (CARAFATE) 1 gram tablet, Take 1 tablet (1 g total) by mouth 4 (four) times daily before meals and nightly. for 10 days, Disp: 40 tablet, Rfl: 0    tirzepatide (MOUNJARO) 12.5 mg/0.5 mL PnIj, Inject 12.5 mg into the skin every 7 days., Disp: 4 Pen, Rfl: 11    Objective:        Physical Exam  Vitals reviewed.   Constitutional:       Appearance: Normal appearance.   HENT:      Head: Normocephalic and atraumatic.   Eyes:      Conjunctiva/sclera: Conjunctivae normal.   Cardiovascular:      Rate and Rhythm: Normal rate and regular rhythm.      Heart sounds: Normal heart sounds.   Pulmonary:      Effort: Pulmonary effort is normal.      Breath sounds: Normal breath sounds.   Musculoskeletal:         General: Normal range of motion.      Cervical back: Normal range of motion and neck supple.   Skin:     General: Skin is warm and dry.   Neurological:      Mental Status: She is alert and oriented to person, place, and time.   Psychiatric:         Behavior: Behavior normal.           Visit Vitals  /74   Pulse 69   Temp 97.6 °F (36.4 °C) (Oral)   Ht 5' 7" (1.702 m)   Wt (!) 153.5 kg (338 lb 6.5 oz)   LMP 06/11/2024 (Within Days)   SpO2 98%   BMI 53.00 kg/m²      Assessment:         1. Hypothyroidism due to Hashimoto's thyroiditis    2. Type 2 diabetes mellitus with other specified complication, without long-term current use of insulin    3. Gastroesophageal reflux disease, unspecified whether esophagitis present        Plan:         Diagnoses and all orders for this visit:    Hypothyroidism due to Hashimoto's thyroiditis       -    Stable, following with endocrinology. On Synthroid.     Type 2 diabetes mellitus with other specified complication, without long-term current use of insulin  -     CBC Auto Differential; Future  -     Labs as ordered by endocrinology. On " Mounjaro 12.5 mg. Well controlled.    Gastroesophageal reflux disease, unspecified whether esophagitis present          -   Protonix recently increased to twice daily by GI. Following with GI. Plans for EGD and colonoscopy.    Encounter for screening mammogram for malignant neoplasm of breast            -   Diagnostic mammogram and U/s as previously ordered       Follow up in 6 months.        Family Medicine Physician Assistant     Future Appointments       Date Provider Specialty Appt Notes    8/19/2024 Darryl Harper MD Obstetrics and Gynecology abnormal cycle    9/11/2024 ELIZABETH Nava PA-C Endocrinology Diabetes    1/6/2025 Oniel Malik MD Family Medicine year f/u             Tests to Keep You Healthy    Mammogram: Met on 7/12/2023  Eye Exam: Met on 12/18/2023  Colon Cancer Screening: SCHEDULED  Cervical Cancer Screening: Met on 3/25/2021  Last HbA1c < 8 (02/27/2024): Yes       I spent a total of 20 minutes on the day of the visit.This includes face to face time and non-face to face time preparing to see the patient (eg, review of tests), obtaining and/or reviewing separately obtained history, documenting clinical information in the electronic or other health record, independently interpreting results and communicating results to the patient/family/caregiver, or care coordinator.

## 2024-07-15 ENCOUNTER — TELEPHONE (OUTPATIENT)
Dept: GASTROENTEROLOGY | Facility: CLINIC | Age: 49
End: 2024-07-15
Payer: COMMERCIAL

## 2024-07-15 NOTE — TELEPHONE ENCOUNTER
Call placed to Ms. Kate in regards to message received. No answer, left  detailed message to return call to confirm cancellation.     Case removed from board.

## 2024-07-15 NOTE — TELEPHONE ENCOUNTER
----- Message from Awilda Christina sent at 7/15/2024 11:09 AM CDT -----  Contact: self  Type: Needs Medical Advice  Who Called:  pt  Best Call Back Number: [phone]   Additional Information: pt needs to cancel appt on 7/19 due to it being out of network

## 2024-07-19 DIAGNOSIS — F32.A ANXIETY AND DEPRESSION: ICD-10-CM

## 2024-07-19 DIAGNOSIS — F41.9 ANXIETY AND DEPRESSION: ICD-10-CM

## 2024-07-19 RX ORDER — ESCITALOPRAM OXALATE 20 MG/1
20 TABLET ORAL
Qty: 90 TABLET | Refills: 1 | Status: SHIPPED | OUTPATIENT
Start: 2024-07-19

## 2024-07-20 NOTE — TELEPHONE ENCOUNTER
No care due was identified.  [facility] Embedded Care Due Messages. Reference number: [id number].   7/19/2024 9:29:22 PM CDT

## 2024-07-20 NOTE — TELEPHONE ENCOUNTER
Refill Decision Note   Lavinia Herrera  is requesting a refill authorization.  Brief Assessment and Rationale for Refill:  Approve     Medication Therapy Plan:         Comments:     Note composed:11:07 PM 07/19/2024

## 2024-07-25 NOTE — TELEPHONE ENCOUNTER
RE: Plan of Care    Marley Murray PA-C    Thank you for referring Bassemnaga CAROLA Love. The following information reflects my assessment and plan of care.    Please review and sign the attached form to indicate your approval of the plan of care. Insurance compliance requires your approval be on this plan of care. After your review, please fax back all pages received. Should you have any questions, feel free to contact me.    Ida Maciel, PT  [facility]  [address] [place], [place] [address]VANESA MCINTYRE  [facility] [address]  Phone: [phone]  Fax: [phone]           Plan of Care 24   Effective from: 2024  Effective to: 10/17/2024    Plan ID: [id number]                Participants as of Finalize on 2024      Name Type Comments Contact Info    Provider Not In System Referring Provider      Ida Maciel, PT Physical Therapist  [phone]               Tj Love \"Mega\" MRN:[id number] (:1977 46 year old M)                 Evaluation       Author: Ida Maciel PT Status: Signed Last edited: 2024  3:45 PM         Physical Therapy Evaluation    Visit Type: Initial Evaluation  Visit: 1  Referring Provider: Marley Murray PA-C  Medical Diagnosis (from order): M43.17 - Spondylolisthesis, lumbosacral region  M54.42, M54.41, G89.29 - Chronic bilateral low back pain with bilateral sciatica   Treatment Diagnosis: lumbar - increased pain/symptoms, impaired posture, impaired range of motion, impaired muscle length/flexibility, impaired joint play/mobility, impaired strength, impaired gait, impaired activity tolerance, impaired body mechanics and impaired mobility.  Chart reviewed at time of initial evaluation (relevant co-morbidities, allergies, tests and medications listed):   - Diagnostic tests reviewed: X-Ray and CT Scan  2010: MRSA infection    Comment:  left forearm    anterolisthesis of L5 on S1 secondary to L5 pars defects (grade 2 per prior  GenSurg referral: per patient, doesn't need appointment.   order)       SUBJECTIVE                                                                                                               Back started to bother him April 2022, no known injury. Has had PT and injections for treatment of back pain and has even gotten an outdoor pool at home to help with pain. Several doctors have recommended a spinal fusion. Had nerve blocks and epidurals at end of February/beginning of March with sedation. He needs to schedule another round of blocks, they want to space that out more with epidural to see which gives better relief. They are trying to support him with conservative care. If the blocks/epidural help, may do an ablation.   Had the worst flare up of his life a few weeks ago, lasted 4-5 days, usually they don't last more than 2-3 days. Can't remember the last time he has been in the pool, been busy with kids. Feels he has digressed since last in therapy/injections. Not sure where is TENS unit is. Using the heating pad most nights. Tries to do his stretches when in bed when he thinks of it. Is fairly sore. Knees and hips are also real bad. Recently moved bedroom upstairs, can feel that more. Hasn't been going for walks. Feet have been feeling on fire. Has numbness in his legs. Goes all the way to feet, can be either side.   Feels physical therapy has been the most beneficial for improving pain.   With flare up, he laid around and tried to sleep    Pain / Symptoms  - Pain/symptom is: constant  - Pain rating (out of 10): Current: 7 ; Best: 7; Worst: 8  - Location: Low back into legs  - denies tripping stumbling and/or loss of balance reports pain is increased with sneezing, coughing and/or laughing reports numbness, tingling and/or radiating symptoms denies pain is worse at night denies changes in bowel and bladder function  - Quality / Description: sharp     - Constant, debilitating, excruciating   - Alleviating Factors: heat, over-the-counter medication     - Pool  Tens   -  Progression since onset: improved and worsening (feels like a roller coster)    Function:   Limitations / Exacerbation Factors:   - Patient reports pain, difficulty and increased time with function reported below.  - sleep disturbed, bed mobility, lower body dressing, standing tasks, sitting tasks, driving/riding in a vehicle, grocery shopping, house/yard work,  activities, bending/squatting/lifting, walking, standing and sitting, all types of transfers, community distances, household distances, stairs  Prior Level of Function: declining function, therefore referred to therapy,    Patient Goals: . Get back to more manageable pain level    Prior treatment  - outpatient PT  - Discharged from hospital, home health, or skilled nursing facility in last 30 days: no  Home Environment   - Patient lives with: significant other and young children  - Type of home: multiple level home  - Assistance available: as needed  - Denies 2 or more falls or an unexplained fall with injury in the last year.  - Feel safe at home / work / school: yes      OBJECTIVE                                                                                                                     Range of Motion (ROM)   (degrees unless noted; active unless noted; norms in ( ); negative=lacking to 0, positive=beyond 0)  Lumbar:    - Flexion (60-80):  pain     - Extension (25):  5%  pain     - Side Bend (25-35):         Left:  pain          Right:  pain   Finger tip to floor measurements:     - Flexion: 61 cm    - Side Bend Left: 62 cm     - Side Bend Right: 64 cm  Comments: Prone on elbows relieves leg pain    Strength  (out of 5 unless noted, standard test position unless noted)   Hip:    - Flexion:         Left: 4+, pain (hips)         Right: 4+, pain (hips)    - Extension:         Left: 3+, pain         Right: 4-, pain    - Abduction:         Left: pain, 3+         Right: 4-, pain    - Internal Rotation:         Left: 4, pain (hips and knees)          Right: 4+, pain (hips and knees)    - External Rotation:         Left: 4+, pain (hips and knees)         Right: 4+, pain (hips and knees)  Knee:    - Flexion:         Left: 4-, pain (knee)         Right: 4 and pain (knee)    - Extension:         Left: 4, pain (knees)         Right: 4, pain (knees)  Ankle:    - Dorsiflexion:         Left: 5         Right: 5       Palpation  Left  - Lumbar Paraspinals: tenderness  - Quadratus Lumborum: tenderness  - Gluteus Medius: tenderness  - Gluteus Minimus: tenderness  - Piriformis: tenderness  Right  - Lumbar Paraspinals: tenderness  - Quadratus Lumborum: tenderness  - Gluteus Medius: tenderness  - Gluteus Minimus: tenderness  - Piriformis: tenderness  Lumbar  - L1-L2: - Left: no tenderness - Right: no tenderness  - L2-L3: - Left: no tenderness - Right: no tenderness  - L3-L4: - Left: no tenderness - Right: no tenderness  - L4-L5: - Left: tenderness - Right: tenderness  - L5-S1: - Left: tenderness - Right: tenderness  - SIJ: - Left: tenderness - Right: tenderness  Joint Play   Thoracic Segmental Testing   - T12-L1: WFL  Lumbar   - L1-L2: pain  - L2-L3: pain    Muscle Length Tests  - 90/90 Hamstring at knee:  - Left:  40° - Right: 50°    Special Tests  Lumbar: Nerve Tests  - Slump Test: Left: positive Right: positive  - Supine Passive Straight Leg Raise: Left: positive Right: positive  - Supine Lumbar Distraction: Left: positive (feels good) Right: positive (feels good)            Outcome/Assessments  Outcome Measures:   OSWESTRY Total Scored: 32  OSWESTRY Total Possible Score: 45  OSWESTRY Score Calculated: 71.11 %  (0-20% = minimal disability; 20-40% = moderate disability; 40-60% = severe disability; 60-80% = crippled; % = bed bound) see flowsheet for additional documentation  Patient Specific Functional Scale:   Activity: Playing outside with kids, Score: 3  Activity: Bring in groceries (help wife), Score: 4  Activity: Be able to take a walk with less pain, Score:  3  Average Score: 3.3  Each activity is scored: 0=unable to perform activity to 10=able to perform activity at the same level as before injury or problem    Treatment    Dry Needling:  - Consent signed: yes  - Dry needling used to/for: pain relief and reduced myofascial dysfunction/restriction  - Education about indications, contraindications and potential side effects completed with patient.  Screen Completed    - Precautions: local skin lesions, lyme disease, local lymphedema, severe hyperalgesia/allodynia, metal allergies: nickel and chromium, abnormal bleeding tendency, immunodeficiency and/or compromised immune system, second or third trimester of pregnancy, vascular disease, history of spontaneous pneumothorax   - Contraindications: local or systemic infections including the flu, over implants, active cancer, area of lymphatic compromise, area of lumpectomy/mastectomy, first trimester of pregnancy      - Location: right L4 lumbar multifidi Needle size: 50 Quantity: 1     - Location: left L5 lumbar multifidi Needle size: 50 Quantity: 1  Total Needles Inserted: 2 Removed: 2    Time Out performed at 1622, with patient verifying procedure and consent prior to performing the procedure.    Sign Out performed at 1627, with patient verifying procedure performed and addressing specimens if applicable upon completion of the procedure.    Reaction: no adverse reaction to treatment    Therapeutic Exercise  Prone on elbows 30 -60 sec    Works on IT band stretch, hip abd/extension on side from prior, that's about it  -encouraged pt to write down all home program/resources/ways he can improve pain that he has been given or instructed in for next session    Seated  Lower extremity nerve glide, educated on not holding, nerves needing space, movement and blood x10  Hamstring stretch 30 sec     Skilled input: verbal instruction/cues, tactile instruction/cues and as detailed above    Writer verbally educated and received verbal  consent for hand placement, positioning of patient, and techniques to be performed today from patient for hand placement and palpation for techniques, clothing adjustments for techniques and therapist position for techniques as described above and how they are pertinent to the patient's plan of care.  Home Exercise Program  Access Code: [email or web address]  URL: [email or web address]  Date: 07/25/2024  Prepared by: Ida Maciel    Exercises  - Prone on Elbows Stretch  - 3 x daily - 4-5 sets - 30-60 hold  - Seated Sciatic Tensioner  - 2 x daily - 1 sets - 10 reps - 1 hold  - Seated Hamstring Stretch  - 2 x daily - 2 sets - 30 hold    Write down ways he can improve pain/strategies for keeping pain low      ASSESSMENT                                                                                                          46 year old patient has reported functional limitations listed above impacted by signs and symptoms consistent with treatment diagnosis below.  Treatment Diagnosis:   - Involved: lumbar.  - Symptoms/impairments: increased pain/symptoms, impaired posture, impaired range of motion, impaired muscle length/flexibility, impaired joint play/mobility, impaired strength, impaired gait, impaired activity tolerance, impaired body mechanics and impaired mobility.    Pt presents to therapy with signs and symptoms consistent with low back pain as well as central sensitization.      Pain/symptoms after session (out of 10): 7    Prognosis: patient will benefit from skilled therapy  Rehabilitative potential is: fair due to. Positive factors: positive response/result of prior treatment. Negative factors: co-morbidities, pain level and time since onset of symptoms.  Predicted patient presentation: Moderate (evolving) - Patient comorbidities and complexities, as defined above, may have varying impact on steady progress for prescribed plan of care.  Education:   - Present and ready to learn: patient  -  Results of above outlined education: Verbalizes understanding and Needs reinforcement    PLAN                                                                                                                         The following skilled interventions to be implemented to achieve goals listed below:  Neuromuscular Re-Education (77218)  Therapeutic Activity (24814)  Therapeutic Exercise (68235)  Manual Therapy (84339)  Gait Training (34191)  Dry Needling    Frequency / Duration  2 times per week tapering as patient progresses for 12 weeks for an estimated total of 12 visits    Patient involved in and agreed to plan of care and goals.    Suggestions for next session as indicated: Progress per plan of care  Follow up with written home plan for pain strategies  Pain neuroscience education  Strengthening  Manual long axis/prone progression  Dry needling pending pain    Goals  Long Term Goals: to be met by end of plan of care  1. Patient will ambulate without an assistive device independently over various surfaces/obstacles including level surfaces, unlevel surfaces for safety in home for home/yard management.  2. Patient will complete 1 flight of steps with 1 hand rails using reciprocal pattern modified independently without an assistive device with efficient eccentric control, without pain/symptoms and without reported difficulty for safe access to bedroom.   3. Patient will squat and lift from floor young son, with patient reported manageable pain/symptoms of 3-4/10 and with patient reported manageable/tolerable difficulty for completion of  activities.   4. Oswestry: Patient will score 45% or lower on The Oswestry Disability Index to indicate a decreased level of impairment related to back or leg symptoms. (minimal clinically important difference: 12% of calculated score)      Therapy procedure time and total treatment time can be found documented on the Time Entry flowsheet           Current Participants as  of 7/25/2024      Name Type Comments Contact Info    Provider Not In System Referring Provider      Signature pending    Ida Maciel, PT Physical Therapist  [phone]    Signature pending            RE: Plan of Care for Tj Love, YOB: 1977     I certify the need for these services, furnished under this plan of treatment and while under my care.  I agree with the plan of care as stated and request that therapy proceed.        __________________________________________________________________________________  Provider Signature         Date

## 2024-09-10 ENCOUNTER — TELEPHONE (OUTPATIENT)
Dept: ENDOCRINOLOGY | Facility: CLINIC | Age: 49
End: 2024-09-10
Payer: COMMERCIAL

## 2024-10-10 DIAGNOSIS — E11.9 TYPE 2 DIABETES MELLITUS WITHOUT COMPLICATION: ICD-10-CM

## 2024-11-01 DIAGNOSIS — F32.A ANXIETY AND DEPRESSION: ICD-10-CM

## 2024-11-01 DIAGNOSIS — F41.9 ANXIETY AND DEPRESSION: ICD-10-CM

## 2024-11-01 RX ORDER — ESCITALOPRAM OXALATE 20 MG/1
20 TABLET ORAL
Qty: 90 TABLET | Refills: 0 | Status: SHIPPED | OUTPATIENT
Start: 2024-11-01

## 2024-12-03 RX ORDER — PANTOPRAZOLE SODIUM 40 MG/1
40 TABLET, DELAYED RELEASE ORAL 2 TIMES DAILY
Qty: 60 TABLET | Refills: 11 | Status: SHIPPED | OUTPATIENT
Start: 2024-12-03 | End: 2025-11-28

## 2024-12-23 ENCOUNTER — OFFICE VISIT (OUTPATIENT)
Dept: FAMILY MEDICINE | Facility: CLINIC | Age: 49
End: 2024-12-23
Payer: COMMERCIAL

## 2024-12-23 VITALS
WEIGHT: 293 LBS | SYSTOLIC BLOOD PRESSURE: 122 MMHG | OXYGEN SATURATION: 99 % | HEART RATE: 55 BPM | TEMPERATURE: 98 F | BODY MASS INDEX: 45.99 KG/M2 | DIASTOLIC BLOOD PRESSURE: 80 MMHG | HEIGHT: 67 IN

## 2024-12-23 DIAGNOSIS — M25.552 LEFT HIP PAIN: Primary | ICD-10-CM

## 2024-12-23 PROCEDURE — 1160F RVW MEDS BY RX/DR IN RCRD: CPT | Mod: CPTII,S$GLB,, | Performed by: NURSE PRACTITIONER

## 2024-12-23 PROCEDURE — 3074F SYST BP LT 130 MM HG: CPT | Mod: CPTII,S$GLB,, | Performed by: NURSE PRACTITIONER

## 2024-12-23 PROCEDURE — 99213 OFFICE O/P EST LOW 20 MIN: CPT | Mod: S$GLB,,, | Performed by: NURSE PRACTITIONER

## 2024-12-23 PROCEDURE — 3072F LOW RISK FOR RETINOPATHY: CPT | Mod: CPTII,S$GLB,, | Performed by: NURSE PRACTITIONER

## 2024-12-23 PROCEDURE — 3008F BODY MASS INDEX DOCD: CPT | Mod: CPTII,S$GLB,, | Performed by: NURSE PRACTITIONER

## 2024-12-23 PROCEDURE — 3044F HG A1C LEVEL LT 7.0%: CPT | Mod: CPTII,S$GLB,, | Performed by: NURSE PRACTITIONER

## 2024-12-23 PROCEDURE — 1159F MED LIST DOCD IN RCRD: CPT | Mod: CPTII,S$GLB,, | Performed by: NURSE PRACTITIONER

## 2024-12-23 PROCEDURE — 99999 PR PBB SHADOW E&M-EST. PATIENT-LVL IV: CPT | Mod: PBBFAC,,, | Performed by: NURSE PRACTITIONER

## 2024-12-23 PROCEDURE — 3079F DIAST BP 80-89 MM HG: CPT | Mod: CPTII,S$GLB,, | Performed by: NURSE PRACTITIONER

## 2024-12-23 RX ORDER — METHYLPREDNISOLONE 4 MG/1
TABLET ORAL
Qty: 21 TABLET | Refills: 0 | Status: SHIPPED | OUTPATIENT
Start: 2024-12-23

## 2024-12-23 RX ORDER — METHOCARBAMOL 500 MG/1
500 TABLET, FILM COATED ORAL 4 TIMES DAILY
Qty: 40 TABLET | Refills: 0 | Status: SHIPPED | OUTPATIENT
Start: 2024-12-23 | End: 2025-01-02

## 2024-12-23 NOTE — PROGRESS NOTES
Subjective:       Patient ID: Lavinia Herrera is a 49 y.o. female.    Chief Complaint: left hip pain     HPI  50 y/o female patient with medical problems listed below presents for left hip pain for 3 days. Denies direct trauma to the affected area. Describes pain as shocking, pain 3/10 in intensity, worse on movement, non-radiating, not associated with nausea, vomiting, abdominal pain, urinary or bowel symptoms, fever, chills. Denies worsening pain. States the pain is easing up as the days go by. States applied heating pad and takes aleve with some relief.     Patient Active Problem List   Diagnosis    Snoring    Elevated blood pressure    Seasonal allergies    Impaired fasting blood sugar    GERD (gastroesophageal reflux disease)    Morbid obesity with BMI of 60.0-69.9, adult    Hypothyroidism    Grief    Type 2 diabetes mellitus with other specified complication, without long-term current use of insulin    LUCAS (obstructive sleep apnea)      Review of patient's allergies indicates:  No Known Allergies    Past Surgical History:   Procedure Laterality Date    ENDOSCOPIC ULTRASOUND OF UPPER GASTROINTESTINAL TRACT N/A 3/3/2023    Procedure: ULTRASOUND, UPPER GI TRACT, ENDOSCOPIC;  Surgeon: Dc Riddle III, MD;  Location: [facility] ENDO;  Service: Endoscopy;  Laterality: N/A;    ERCP N/A 3/3/2023    Procedure: ERCP (ENDOSCOPIC RETROGRADE CHOLANGIOPANCREATOGRAPHY);  Surgeon: Dc Riddle III, MD;  Location: [facility] ENDO;  Service: Endoscopy;  Laterality: N/A;    ERCP N/A 3/7/2023    Procedure: ERCP (ENDOSCOPIC RETROGRADE CHOLANGIOPANCREATOGRAPHY);  Surgeon: Dc Riddle III, MD;  Location: [facility] ENDO;  Service: Endoscopy;  Laterality: N/A;    LAPAROSCOPIC CHOLECYSTECTOMY N/A 3/3/2023    Procedure: CHOLECYSTECTOMY, LAPAROSCOPIC;  Surgeon: Cade Elkins Jr., MD;  Location: [facility] OR;  Service: General;  Laterality: N/A;    MANDIBLE OSTEOTOMY      TYMPANOSTOMY TUBE PLACEMENT  6/30/14        Current  "Outpatient Medications:     allopurinoL (ZYLOPRIM) 100 MG tablet, TAKE 1 TABLET(100 MG) BY MOUTH EVERY DAY, Disp: 30 tablet, Rfl: 11    colestipoL (COLESTID) 1 gram Tab, Take 2 tablets (2 g total) by mouth once daily., Disp: 60 tablet, Rfl: 3    EScitalopram oxalate (LEXAPRO) 20 MG tablet, TAKE 1 TABLET(20 MG) BY MOUTH EVERY DAY, Disp: 90 tablet, Rfl: 0    ezetimibe (ZETIA) 10 mg tablet, Take 1 tablet (10 mg total) by mouth once daily. Cholesterol, Disp: 90 tablet, Rfl: 3    levothyroxine (SYNTHROID) 100 MCG tablet, TAKE 1 TABLET(100 MCG) BY MOUTH BEFORE BREAKFAST, Disp: 30 tablet, Rfl: 11    pantoprazole (PROTONIX) 40 MG tablet, Take 1 tablet (40 mg total) by mouth 2 (two) times daily., Disp: 60 tablet, Rfl: 11    tirzepatide (MOUNJARO) 12.5 mg/0.5 mL PnIj, Inject 12.5 mg into the skin every 7 days., Disp: 4 Pen, Rfl: 11    methocarbamoL (ROBAXIN) 500 MG Tab, Take 1 tablet (500 mg total) by mouth 4 (four) times daily. for 10 days, Disp: 40 tablet, Rfl: 0    methylPREDNISolone (MEDROL DOSEPACK) 4 mg tablet, follow package directions, Disp: 21 tablet, Rfl: 0    Review of Systems   Constitutional:  Negative for chills and fever.   Respiratory:  Negative for cough and shortness of breath.    Cardiovascular:  Negative for chest pain and palpitations.   Gastrointestinal:  Negative for abdominal pain.   Musculoskeletal:  Positive for arthralgias and myalgias.   Neurological:  Negative for dizziness and headaches.       Objective:   /80 (BP Location: Right arm, Patient Position: Sitting)   Pulse (!) 55   Temp 98.2 °F (36.8 °C) (Oral)   Ht 5' 7" (1.702 m)   Wt (!) 152.8 kg (336 lb 13.8 oz)   SpO2 99%   BMI 52.76 kg/m²         Physical Exam  Constitutional:       General: She is not in acute distress.     Appearance: Normal appearance.   HENT:      Head: Atraumatic.   Cardiovascular:      Rate and Rhythm: Normal rate and regular rhythm.      Pulses: Normal pulses.      Heart sounds: Normal heart sounds. "   Pulmonary:      Effort: Pulmonary effort is normal.      Breath sounds: Normal breath sounds.   Abdominal:      General: Abdomen is flat. Bowel sounds are normal.      Palpations: Abdomen is soft.   Musculoskeletal:      Comments: +mild tenderness in left hip   Neurological:      Mental Status: She is oriented to person, place, and time.         Assessment:       1. Left hip pain        Plan:       1. Left hip pain (Primary)  Likely MSK vs other pathology   - methocarbamoL (ROBAXIN) 500 MG Tab; Take 1 tablet (500 mg total) by mouth 4 (four) times daily. for 10 days  Dispense: 40 tablet; Refill: 0  - methylPREDNISolone (MEDROL DOSEPACK) 4 mg tablet; follow package directions  Dispense: 21 tablet; Refill: 0     Patient with be reevaluated in  follow up with pcp  or sooner aman Pate NP

## 2025-01-06 ENCOUNTER — CLINICAL SUPPORT (OUTPATIENT)
Dept: FAMILY MEDICINE | Facility: CLINIC | Age: 50
End: 2025-01-06
Attending: FAMILY MEDICINE
Payer: COMMERCIAL

## 2025-01-06 ENCOUNTER — OFFICE VISIT (OUTPATIENT)
Dept: FAMILY MEDICINE | Facility: CLINIC | Age: 50
End: 2025-01-06
Payer: COMMERCIAL

## 2025-01-06 VITALS
OXYGEN SATURATION: 99 % | SYSTOLIC BLOOD PRESSURE: 124 MMHG | DIASTOLIC BLOOD PRESSURE: 84 MMHG | TEMPERATURE: 98 F | BODY MASS INDEX: 45.99 KG/M2 | WEIGHT: 293 LBS | RESPIRATION RATE: 16 BRPM | HEIGHT: 67 IN | HEART RATE: 66 BPM

## 2025-01-06 DIAGNOSIS — M10.9 GOUT, UNSPECIFIED CAUSE, UNSPECIFIED CHRONICITY, UNSPECIFIED SITE: ICD-10-CM

## 2025-01-06 DIAGNOSIS — M79.10 MYALGIA DUE TO STATIN: ICD-10-CM

## 2025-01-06 DIAGNOSIS — E03.9 HYPOTHYROIDISM, UNSPECIFIED TYPE: ICD-10-CM

## 2025-01-06 DIAGNOSIS — F41.9 ANXIETY AND DEPRESSION: ICD-10-CM

## 2025-01-06 DIAGNOSIS — E11.69 TYPE 2 DIABETES MELLITUS WITH OTHER SPECIFIED COMPLICATION, WITHOUT LONG-TERM CURRENT USE OF INSULIN: Primary | ICD-10-CM

## 2025-01-06 DIAGNOSIS — E66.01 CLASS 3 SEVERE OBESITY WITH BODY MASS INDEX (BMI) OF 50.0 TO 59.9 IN ADULT, UNSPECIFIED OBESITY TYPE, UNSPECIFIED WHETHER SERIOUS COMORBIDITY PRESENT: ICD-10-CM

## 2025-01-06 DIAGNOSIS — E78.5 HYPERLIPIDEMIA, UNSPECIFIED HYPERLIPIDEMIA TYPE: ICD-10-CM

## 2025-01-06 DIAGNOSIS — F32.A ANXIETY AND DEPRESSION: ICD-10-CM

## 2025-01-06 DIAGNOSIS — E66.813 CLASS 3 SEVERE OBESITY WITH BODY MASS INDEX (BMI) OF 50.0 TO 59.9 IN ADULT, UNSPECIFIED OBESITY TYPE, UNSPECIFIED WHETHER SERIOUS COMORBIDITY PRESENT: ICD-10-CM

## 2025-01-06 DIAGNOSIS — M25.569 KNEE PAIN, UNSPECIFIED CHRONICITY, UNSPECIFIED LATERALITY: ICD-10-CM

## 2025-01-06 DIAGNOSIS — E11.69 TYPE 2 DIABETES MELLITUS WITH OTHER SPECIFIED COMPLICATION, WITHOUT LONG-TERM CURRENT USE OF INSULIN: ICD-10-CM

## 2025-01-06 DIAGNOSIS — Z12.11 SCREENING FOR COLON CANCER: ICD-10-CM

## 2025-01-06 DIAGNOSIS — T46.6X5A MYALGIA DUE TO STATIN: ICD-10-CM

## 2025-01-06 PROCEDURE — 99999 PR PBB SHADOW E&M-EST. PATIENT-LVL IV: CPT | Mod: PBBFAC,,, | Performed by: FAMILY MEDICINE

## 2025-01-06 PROCEDURE — 3008F BODY MASS INDEX DOCD: CPT | Mod: CPTII,S$GLB,, | Performed by: FAMILY MEDICINE

## 2025-01-06 PROCEDURE — 2025F 7 FLD RTA PHOTO W/O RTNOPTHY: CPT | Mod: CPTII,S$GLB,, | Performed by: OPTOMETRIST

## 2025-01-06 PROCEDURE — 1159F MED LIST DOCD IN RCRD: CPT | Mod: CPTII,S$GLB,, | Performed by: FAMILY MEDICINE

## 2025-01-06 PROCEDURE — 3079F DIAST BP 80-89 MM HG: CPT | Mod: CPTII,S$GLB,, | Performed by: FAMILY MEDICINE

## 2025-01-06 PROCEDURE — 99396 PREV VISIT EST AGE 40-64: CPT | Mod: S$GLB,,, | Performed by: FAMILY MEDICINE

## 2025-01-06 PROCEDURE — 92228 IMG RTA DETC/MNTR DS PHY/QHP: CPT | Mod: 26,S$GLB,, | Performed by: OPTOMETRIST

## 2025-01-06 PROCEDURE — 3074F SYST BP LT 130 MM HG: CPT | Mod: CPTII,S$GLB,, | Performed by: FAMILY MEDICINE

## 2025-01-06 RX ORDER — TIRZEPATIDE 12.5 MG/.5ML
12.5 INJECTION, SOLUTION SUBCUTANEOUS
Qty: 4 PEN | Refills: 11 | Status: SHIPPED | OUTPATIENT
Start: 2025-01-06

## 2025-01-06 RX ORDER — PANTOPRAZOLE SODIUM 40 MG/1
40 TABLET, DELAYED RELEASE ORAL DAILY
Qty: 90 TABLET | Refills: 3 | Status: SHIPPED | OUTPATIENT
Start: 2025-01-06 | End: 2026-01-01

## 2025-01-06 RX ORDER — EZETIMIBE 10 MG/1
10 TABLET ORAL DAILY
Qty: 90 TABLET | Refills: 3 | Status: SHIPPED | OUTPATIENT
Start: 2025-01-06 | End: 2026-01-06

## 2025-01-06 RX ORDER — ALLOPURINOL 100 MG/1
100 TABLET ORAL DAILY
Qty: 90 TABLET | Refills: 3 | Status: SHIPPED | OUTPATIENT
Start: 2025-01-06 | End: 2026-01-06

## 2025-01-06 RX ORDER — ESCITALOPRAM OXALATE 20 MG/1
20 TABLET ORAL DAILY
Qty: 90 TABLET | Refills: 3 | Status: SHIPPED | OUTPATIENT
Start: 2025-01-06

## 2025-01-06 RX ORDER — LEVOTHYROXINE SODIUM 100 UG/1
100 TABLET ORAL
Qty: 90 TABLET | Refills: 3 | Status: SHIPPED | OUTPATIENT
Start: 2025-01-06 | End: 2026-01-06

## 2025-01-06 NOTE — PROGRESS NOTES
Subjective:   Patient ID: Lavinia Herrera is a 49 y.o. female     Chief Complaint:Annual Exam      Here for checkup  Review of Systems   Respiratory:  Negative for shortness of breath.    Cardiovascular:  Negative for chest pain.   Gastrointestinal:  Negative for abdominal pain.   Genitourinary:  Negative for dysuria.   Musculoskeletal:  Positive for arthralgias.     Past Medical History:   Diagnosis Date    Allergy     Diabetes mellitus, type 2     GERD (gastroesophageal reflux disease)     Hypothyroidism     Morbid obesity     Otitis media     Seasonal allergies     Sleep apnea      Past Surgical History:   Procedure Laterality Date    ENDOSCOPIC ULTRASOUND OF UPPER GASTROINTESTINAL TRACT N/A 3/3/2023    Procedure: ULTRASOUND, UPPER GI TRACT, ENDOSCOPIC;  Surgeon: Dc Riddle III, MD;  Location: [facility] ENDO;  Service: Endoscopy;  Laterality: N/A;    ERCP N/A 3/3/2023    Procedure: ERCP (ENDOSCOPIC RETROGRADE CHOLANGIOPANCREATOGRAPHY);  Surgeon: Dc Riddle III, MD;  Location: [facility] ENDO;  Service: Endoscopy;  Laterality: N/A;    ERCP N/A 3/7/2023    Procedure: ERCP (ENDOSCOPIC RETROGRADE CHOLANGIOPANCREATOGRAPHY);  Surgeon: Dc Riddle III, MD;  Location: [facility] ENDO;  Service: Endoscopy;  Laterality: N/A;    LAPAROSCOPIC CHOLECYSTECTOMY N/A 3/3/2023    Procedure: CHOLECYSTECTOMY, LAPAROSCOPIC;  Surgeon: Cade Elkins Jr., MD;  Location: [facility];  Service: General;  Laterality: N/A;    MANDIBLE OSTEOTOMY      TYMPANOSTOMY TUBE PLACEMENT  6/30/14     Objective:     Vitals:    01/06/25 1425   BP: 124/84   Pulse: 66   Resp: 16   Temp: 98.3 °F (36.8 °C)     Body mass index is 52.04 kg/m².  Physical Exam  Vitals and nursing note reviewed.   Constitutional:       Appearance: She is well-developed.   HENT:      Head: Normocephalic and atraumatic.   Eyes:      General: No scleral icterus.     Conjunctiva/sclera: Conjunctivae normal.   Cardiovascular:      Pulses:            Dorsalis pedis pulses are 2+ on the right side and 2+ on the left side.        Posterior tibial pulses are 2+ on the right side and 2+ on the left side.      Heart sounds: No murmur heard.  Pulmonary:      Effort: Pulmonary effort is normal. No respiratory distress.   Musculoskeletal:         General: No deformity. Normal range of motion.      Cervical back: Normal range of motion and neck supple.      Right foot: Normal range of motion.      Left foot: Normal range of motion.   Feet:      Right foot:      Protective Sensation: 4 sites tested.  4 sites sensed.      Skin integrity: Skin integrity normal.      Left foot:      Protective Sensation: 4 sites tested.  4 sites sensed.      Skin integrity: Skin integrity normal.   Skin:     Coloration: Skin is not pale.      Findings: No rash.   Neurological:      Mental Status: She is alert and oriented to person, place, and time.   Psychiatric:         Behavior: Behavior normal.         Thought Content: Thought content normal.         Judgment: Judgment normal.     Assessment:     1. Type 2 diabetes mellitus with other specified complication, without long-term current use of insulin    2. Hyperlipidemia, unspecified hyperlipidemia type    3. Anxiety and depression    4. Gout, unspecified cause, unspecified chronicity, unspecified site    5. Class 3 severe obesity with body mass index (BMI) of 50.0 to 59.9 in adult, unspecified obesity type, unspecified whether serious comorbidity present    6. Knee pain, unspecified chronicity, unspecified laterality    7. Myalgia due to statin    8. Screening for colon cancer    9. Hypothyroidism, unspecified type      Plan:   Type 2 diabetes mellitus with other specified complication, without long-term current use of insulin  -     Cancel: Comprehensive Metabolic Panel; Future; Expected date: 01/06/2025  -     Cancel: Hemoglobin A1C; Future; Expected date: 01/06/2025  -     Cancel: Lipid Panel; Future; Expected date: 01/06/2025  -      Cancel: CBC Auto Differential; Future; Expected date: 01/06/2025  -     Cancel: TSH; Future; Expected date: 01/06/2025  -     CBC Auto Differential; Future; Expected date: 01/06/2025  -     Comprehensive Metabolic Panel; Future; Expected date: 01/06/2025  -     Hemoglobin A1C; Future; Expected date: 01/06/2025  -     Lipid Panel; Future; Expected date: 01/06/2025  -     TSH; Future; Expected date: 01/06/2025  -     levothyroxine (SYNTHROID) 100 MCG tablet; Take 1 tablet (100 mcg total) by mouth before breakfast.  Dispense: 90 tablet; Refill: 3  -     tirzepatide (MOUNJARO) 12.5 mg/0.5 mL PnIj; Inject 12.5 mg into the skin every 7 days.  Dispense: 4 Pen; Refill: 11  -     Diabetic Eye Screening Photo; Future  -     Microalbumin/creatinine urine ratio; Future; Expected date: 01/06/2025    Hyperlipidemia, unspecified hyperlipidemia type  -     ezetimibe (ZETIA) 10 mg tablet; Take 1 tablet (10 mg total) by mouth once daily. Cholesterol  Dispense: 90 tablet; Refill: 3    Anxiety and depression  -     EScitalopram oxalate (LEXAPRO) 20 MG tablet; Take 1 tablet (20 mg total) by mouth once daily.  Dispense: 90 tablet; Refill: 3    Gout, unspecified cause, unspecified chronicity, unspecified site  -     allopurinoL (ZYLOPRIM) 100 MG tablet; Take 1 tablet (100 mg total) by mouth once daily.  Dispense: 90 tablet; Refill: 3    Class 3 severe obesity with body mass index (BMI) of 50.0 to 59.9 in adult, unspecified obesity type, unspecified whether serious comorbidity present  Counseled  Knee pain, unspecified chronicity, unspecified laterality  Declined xray,   Myalgia due to statin  Myalgia on zetia  Screening for colon cancer  -     Ambulatory referral/consult to Gastroenterology; Future; Expected date: 01/13/2025    Other orders  -     pantoprazole (PROTONIX) 40 MG tablet; Take 1 tablet (40 mg total) by mouth once daily.  Dispense: 90 tablet; Refill: 3            Total time spent of Greater than 30 minutes minutes on the day  of the visit.This includes face to face time and preparing to see the patient, obtaining and reviewing separately obtained history, documenting clinical information in the electronic or other health record, independently interpreting results and communicating results to the patient/family/caregiver, or care coordinator.    Established patient with me has been instructed that must see me at least 1 time yearly (every 365 days) for refills of medications. Seeing other providers in this clinic is fine but expectation is to see me yearly.    Oniel Malik MD  01/06/2025    Portions of this note have been dictated with LINDA Robles

## 2025-01-06 NOTE — Clinical Note
No gross diabetic retinopathy is noted in either eye. Cup / disc asymmetry OD>OS --consider low risk glaucoma suspect. Advise dilated fundus exam in 12 months.

## 2025-01-07 ENCOUNTER — TELEPHONE (OUTPATIENT)
Dept: GASTROENTEROLOGY | Facility: CLINIC | Age: 50
End: 2025-01-07
Payer: COMMERCIAL

## 2025-01-07 NOTE — PROGRESS NOTES
Lavinia Herrera is a 49 y.o. female here for a diabetic eye screening with non-dilated fundus photos per Dr. Malik.    Patient cooperative?: Yes  Small pupils?: No  Last eye exam: Not Known    For exam results, see Encounter Report.

## 2025-01-10 ENCOUNTER — PATIENT MESSAGE (OUTPATIENT)
Dept: ADMINISTRATIVE | Facility: HOSPITAL | Age: 50
End: 2025-01-10
Payer: COMMERCIAL

## 2025-03-05 ENCOUNTER — PATIENT OUTREACH (OUTPATIENT)
Dept: ADMINISTRATIVE | Facility: HOSPITAL | Age: 50
End: 2025-03-05
Payer: COMMERCIAL

## 2025-03-05 DIAGNOSIS — E11.9 TYPE 2 DIABETES MELLITUS WITHOUT COMPLICATION: ICD-10-CM

## 2025-03-05 NOTE — PROGRESS NOTES
Population Health Chart Review & Patient Outreach Details      Additional [facility] Health Notes:               Updates Requested / Reviewed:             Health Maintenance Topics Overdue:      [facility] Score: 5     Colon Cancer Screening  Urine Screening  Hemoglobin A1c  Lipid Panel  Mammogram                       Health Maintenance Topic(s) Outreach Outcomes & Actions Taken:    Lab(s) - Outreach Outcomes & Actions Taken  : Overdue Lab(s) Ordered

## 2025-04-11 ENCOUNTER — PATIENT MESSAGE (OUTPATIENT)
Dept: ADMINISTRATIVE | Facility: HOSPITAL | Age: 50
End: 2025-04-11
Payer: COMMERCIAL

## 2025-04-15 ENCOUNTER — PATIENT OUTREACH (OUTPATIENT)
Dept: ADMINISTRATIVE | Facility: HOSPITAL | Age: 50
End: 2025-04-15
Payer: COMMERCIAL

## 2025-04-15 PROBLEM — T46.6X5A MYALGIA DUE TO STATIN: Status: ACTIVE | Noted: 2025-04-15

## 2025-04-15 PROBLEM — M79.10 MYALGIA DUE TO STATIN: Status: ACTIVE | Noted: 2025-04-15

## 2025-04-15 NOTE — LETTER
April 15, 2025    Lavinia Herrera  [address] [place] [id number]             [facility]  [address]  [place] [address]  Phone: [phone] Dear Christina, [facility] is committed to your overall health and would like to ensure that you are up to date on your recommended test and/or procedures.   Oniel Malik MD has found that your chart shows you may be due for the following:      Health Maintenance Due   Topic    Diabetes Urine Screening     Colorectal Cancer Screening     Mammogram     Hemoglobin A1c     Lipid Panel        If you have had any of the above done at another facility, please let us know so that we may obtain copies from that facility.  If you have a copy of these records, please provide a copy so that we may update your records.  Also, You are welcome to request that the report be faxed to us at   [phone].    If you have an upcoming scheduled appointment for the above test and/or procedures, please disregard this letter.  Otherwise, please contact us through your [facility] portal or by calling [phone] and we will assist in scheduling these appointments for you.      Thank you for letting us care for you,    Sincerely,      Your [facility]  Karrie Lazaro, Care Coordinator  Phone: [phone]  FAX: [phone]

## 2025-04-15 NOTE — PROGRESS NOTES
LEFT MESSAGE TO RETURN CALL  LETTER COMMUNICATION MAILED    COLORECTAL CANCER SCREENING   informed patient to schedule cscope.  Case request placed  KIDNEY HEALTH EVALUATION FOR PATIENTS WITH DIABETES    STATIN THERAPY FOR PATIENTS WITH DIABETES ,  msg to pcp.  See below      Also, due for Mammogram and total diabetic labs

## 2025-04-15 NOTE — PROGRESS NOTES
The patient is showing as non-compliant on the Statin Therapy Measure.   Myalgia    Statin intolerance noted in documentation and/or problem list.       Exclusions: Intolerance needs a 2025 code (myalgia, myositis, myopathy, or rhabdomyolysis) during the current measurement year AND problem list updated and reviewed IF APPLIES.   G72.0  Drug-induced myopathy or rhabdomyolysis   G72.9  Myopathy, unspecified  M60.9  Myositis, unspecified  M79.10 Myalgia, unspecified site      Examples to place on problem list:  Myalgia due to Statin  Myopathy due to Statin    MSG SENT TO PCP TO REVIEW

## 2025-04-22 NOTE — ADDENDUM NOTE
Addended by: JODI NEAL on: 5/14/2024 04:13 PM     Modules accepted: Level of Service    
Not Applicable

## 2025-07-11 ENCOUNTER — PATIENT MESSAGE (OUTPATIENT)
Dept: ADMINISTRATIVE | Facility: HOSPITAL | Age: 50
End: 2025-07-11
Payer: COMMERCIAL

## 2025-07-17 DIAGNOSIS — E11.9 TYPE 2 DIABETES MELLITUS WITHOUT COMPLICATION: ICD-10-CM

## 2025-07-17 DIAGNOSIS — E11.69 TYPE 2 DIABETES MELLITUS WITH OTHER SPECIFIED COMPLICATION, WITHOUT LONG-TERM CURRENT USE OF INSULIN: ICD-10-CM

## 2025-07-30 DIAGNOSIS — Z12.31 OTHER SCREENING MAMMOGRAM: ICD-10-CM

## 2025-08-01 ENCOUNTER — PATIENT MESSAGE (OUTPATIENT)
Dept: ADMINISTRATIVE | Facility: HOSPITAL | Age: 50
End: 2025-08-01
Payer: COMMERCIAL

## 2025-08-01 ENCOUNTER — PATIENT OUTREACH (OUTPATIENT)
Dept: ADMINISTRATIVE | Facility: HOSPITAL | Age: 50
End: 2025-08-01
Payer: COMMERCIAL

## 2025-08-01 NOTE — PROGRESS NOTES
Population Health Chart Review & Patient Outreach Details    Outreach Performed: YES Portal Active and/or Letter inactive    Additional [facility] Health Notes:    BREAST CANCER SCREENING    Non-compliant report chart audits for BREAST CANCER SCREENING     Outreach to patient in reference to SCHEDULING A MAMMOGRAM EXAM.            Updates Requested / Reviewed:               Health Maintenance Topics Overdue:      VBHM Score: 5     Colon Cancer Screening  Urine Screening  Hemoglobin A1c  Lipid Panel  Mammogram

## 2025-08-22 ENCOUNTER — PATIENT MESSAGE (OUTPATIENT)
Dept: PULMONOLOGY | Facility: CLINIC | Age: 50
End: 2025-08-22
Payer: COMMERCIAL

## (undated) DEVICE — ELECTRODE OLSEN HOOK L-SHAPED 13INX330MM

## (undated) DEVICE — SUTURE VICRYL #0 27 UR-6 VCP603H

## (undated) DEVICE — SUTURE MONOCRYL 4-0 PS-2 27 MCP426H

## (undated) DEVICE — NEEDLE INSUFFLATION 150MM PN150

## (undated) DEVICE — NEEDLE INSUFFLATION 120MM 172015

## (undated) DEVICE — CABLE MONOPOLAR 10FT DISPOSABLE

## (undated) DEVICE — SOLUTION IRRI H2O BOTTLE 1000ML

## (undated) DEVICE — SET INSUFFLATION TUBING HIGH FLOW SMOKE EVACUATION PNEUMOCLE

## (undated) DEVICE — SYRINGE HYPODERMC LL 22G 1.5 ECLIPSE 30

## (undated) DEVICE — GLOVE BIOGEL PI  GOLD SZ 7

## (undated) DEVICE — DISSECTOR KITTNER 13300

## (undated) DEVICE — APPLIER CLIP  5MM

## (undated) DEVICE — TRAY GENERAL LAPAROSCOPY

## (undated) DEVICE — PAD BOVIE ADULT

## (undated) DEVICE — Device

## (undated) DEVICE — SCISSORS 5MM APPLIED MEDICAL   CB030

## (undated) DEVICE — SOLUTION IRRI NS BOTTLE 1000ML R5200-01

## (undated) DEVICE — TROCAR ENDOPATH XCEL BLADELESS B5LT

## (undated) DEVICE — SLEEVE TROCAR ENDOPATH XCEL

## (undated) DEVICE — TROCAR OPTICAL ZTHREAD 12MMX100MM CTF73

## (undated) DEVICE — WARMER SCOPE SEE SHARP